# Patient Record
Sex: MALE | Race: ASIAN | NOT HISPANIC OR LATINO
[De-identification: names, ages, dates, MRNs, and addresses within clinical notes are randomized per-mention and may not be internally consistent; named-entity substitution may affect disease eponyms.]

---

## 2020-01-03 ENCOUNTER — TRANSCRIPTION ENCOUNTER (OUTPATIENT)
Age: 45
End: 2020-01-03

## 2020-08-02 ENCOUNTER — INPATIENT (INPATIENT)
Facility: HOSPITAL | Age: 45
LOS: 8 days | Discharge: HOME | DRG: 329 | End: 2020-08-11
Attending: FAMILY MEDICINE | Admitting: FAMILY MEDICINE
Payer: COMMERCIAL

## 2020-08-02 ENCOUNTER — EMERGENCY (EMERGENCY)
Facility: HOSPITAL | Age: 45
LOS: 1 days | Discharge: HOME | End: 2020-08-02
Attending: EMERGENCY MEDICINE | Admitting: EMERGENCY MEDICINE
Payer: COMMERCIAL

## 2020-08-02 ENCOUNTER — TRANSCRIPTION ENCOUNTER (OUTPATIENT)
Age: 45
End: 2020-08-02

## 2020-08-02 VITALS
WEIGHT: 179.9 LBS | TEMPERATURE: 99 F | RESPIRATION RATE: 18 BRPM | DIASTOLIC BLOOD PRESSURE: 70 MMHG | OXYGEN SATURATION: 98 % | HEART RATE: 96 BPM | SYSTOLIC BLOOD PRESSURE: 110 MMHG

## 2020-08-02 VITALS
DIASTOLIC BLOOD PRESSURE: 78 MMHG | HEART RATE: 98 BPM | RESPIRATION RATE: 16 BRPM | OXYGEN SATURATION: 98 % | TEMPERATURE: 100 F | SYSTOLIC BLOOD PRESSURE: 120 MMHG

## 2020-08-02 VITALS
DIASTOLIC BLOOD PRESSURE: 87 MMHG | TEMPERATURE: 101 F | SYSTOLIC BLOOD PRESSURE: 121 MMHG | HEIGHT: 65 IN | OXYGEN SATURATION: 99 % | RESPIRATION RATE: 16 BRPM | HEART RATE: 112 BPM | WEIGHT: 179.9 LBS

## 2020-08-02 DIAGNOSIS — K50.10 CROHN'S DISEASE OF LARGE INTESTINE WITHOUT COMPLICATIONS: ICD-10-CM

## 2020-08-02 LAB
ALBUMIN SERPL ELPH-MCNC: 4.4 G/DL — SIGNIFICANT CHANGE UP (ref 3.3–5)
ALP SERPL-CCNC: 75 U/L — SIGNIFICANT CHANGE UP (ref 30–120)
ALT FLD-CCNC: 31 U/L DA — SIGNIFICANT CHANGE UP (ref 10–60)
ANION GAP SERPL CALC-SCNC: 10 MMOL/L — SIGNIFICANT CHANGE UP (ref 5–17)
APPEARANCE UR: CLEAR — SIGNIFICANT CHANGE UP
APTT BLD: 32.9 SEC — SIGNIFICANT CHANGE UP (ref 27.5–35.5)
AST SERPL-CCNC: 19 U/L — SIGNIFICANT CHANGE UP (ref 10–40)
BASOPHILS # BLD AUTO: 0.04 K/UL — SIGNIFICANT CHANGE UP (ref 0–0.2)
BASOPHILS NFR BLD AUTO: 0.2 % — SIGNIFICANT CHANGE UP (ref 0–2)
BILIRUB SERPL-MCNC: 1.4 MG/DL — HIGH (ref 0.2–1.2)
BILIRUB UR-MCNC: NEGATIVE — SIGNIFICANT CHANGE UP
BUN SERPL-MCNC: 18 MG/DL — SIGNIFICANT CHANGE UP (ref 7–23)
CALCIUM SERPL-MCNC: 9.3 MG/DL — SIGNIFICANT CHANGE UP (ref 8.4–10.5)
CHLORIDE SERPL-SCNC: 104 MMOL/L — SIGNIFICANT CHANGE UP (ref 96–108)
CO2 SERPL-SCNC: 26 MMOL/L — SIGNIFICANT CHANGE UP (ref 22–31)
COLOR SPEC: YELLOW — SIGNIFICANT CHANGE UP
CREAT SERPL-MCNC: 1.18 MG/DL — SIGNIFICANT CHANGE UP (ref 0.5–1.3)
DIFF PNL FLD: NEGATIVE — SIGNIFICANT CHANGE UP
EOSINOPHIL # BLD AUTO: 0 K/UL — SIGNIFICANT CHANGE UP (ref 0–0.5)
EOSINOPHIL NFR BLD AUTO: 0 % — SIGNIFICANT CHANGE UP (ref 0–6)
GLUCOSE SERPL-MCNC: 142 MG/DL — HIGH (ref 70–99)
GLUCOSE UR QL: NEGATIVE MG/DL — SIGNIFICANT CHANGE UP
HCT VFR BLD CALC: 39.9 % — SIGNIFICANT CHANGE UP (ref 39–50)
HGB BLD-MCNC: 13.2 G/DL — SIGNIFICANT CHANGE UP (ref 13–17)
IMM GRANULOCYTES NFR BLD AUTO: 0.4 % — SIGNIFICANT CHANGE UP (ref 0–1.5)
INR BLD: 1 RATIO — SIGNIFICANT CHANGE UP (ref 0.88–1.16)
KETONES UR-MCNC: ABNORMAL
LACTATE SERPL-SCNC: 1.4 MMOL/L — SIGNIFICANT CHANGE UP (ref 0.7–2)
LEUKOCYTE ESTERASE UR-ACNC: NEGATIVE — SIGNIFICANT CHANGE UP
LIDOCAIN IGE QN: 71 U/L — LOW (ref 73–393)
LYMPHOCYTES # BLD AUTO: 1.32 K/UL — SIGNIFICANT CHANGE UP (ref 1–3.3)
LYMPHOCYTES # BLD AUTO: 7.3 % — LOW (ref 13–44)
MCHC RBC-ENTMCNC: 28.2 PG — SIGNIFICANT CHANGE UP (ref 27–34)
MCHC RBC-ENTMCNC: 33.1 GM/DL — SIGNIFICANT CHANGE UP (ref 32–36)
MCV RBC AUTO: 85.3 FL — SIGNIFICANT CHANGE UP (ref 80–100)
MONOCYTES # BLD AUTO: 0.88 K/UL — SIGNIFICANT CHANGE UP (ref 0–0.9)
MONOCYTES NFR BLD AUTO: 4.8 % — SIGNIFICANT CHANGE UP (ref 2–14)
NEUTROPHILS # BLD AUTO: 15.89 K/UL — HIGH (ref 1.8–7.4)
NEUTROPHILS NFR BLD AUTO: 87.3 % — HIGH (ref 43–77)
NITRITE UR-MCNC: NEGATIVE — SIGNIFICANT CHANGE UP
NRBC # BLD: 0 /100 WBCS — SIGNIFICANT CHANGE UP (ref 0–0)
PH UR: 6 — SIGNIFICANT CHANGE UP (ref 5–8)
PLATELET # BLD AUTO: 331 K/UL — SIGNIFICANT CHANGE UP (ref 150–400)
POTASSIUM SERPL-MCNC: 3.7 MMOL/L — SIGNIFICANT CHANGE UP (ref 3.5–5.3)
POTASSIUM SERPL-SCNC: 3.7 MMOL/L — SIGNIFICANT CHANGE UP (ref 3.5–5.3)
PROT SERPL-MCNC: 8.2 G/DL — SIGNIFICANT CHANGE UP (ref 6–8.3)
PROT UR-MCNC: 15 MG/DL
PROTHROM AB SERPL-ACNC: 12.1 SEC — SIGNIFICANT CHANGE UP (ref 10.6–13.6)
RBC # BLD: 4.68 M/UL — SIGNIFICANT CHANGE UP (ref 4.2–5.8)
RBC # FLD: 13.2 % — SIGNIFICANT CHANGE UP (ref 10.3–14.5)
SARS-COV-2 RNA SPEC QL NAA+PROBE: SIGNIFICANT CHANGE UP
SODIUM SERPL-SCNC: 140 MMOL/L — SIGNIFICANT CHANGE UP (ref 135–145)
SP GR SPEC: 1.01 — SIGNIFICANT CHANGE UP (ref 1.01–1.02)
UROBILINOGEN FLD QL: NEGATIVE MG/DL — SIGNIFICANT CHANGE UP
WBC # BLD: 18.2 K/UL — HIGH (ref 3.8–10.5)
WBC # FLD AUTO: 18.2 K/UL — HIGH (ref 3.8–10.5)

## 2020-08-02 PROCEDURE — 99223 1ST HOSP IP/OBS HIGH 75: CPT

## 2020-08-02 PROCEDURE — 83605 ASSAY OF LACTIC ACID: CPT

## 2020-08-02 PROCEDURE — 36415 COLL VENOUS BLD VENIPUNCTURE: CPT

## 2020-08-02 PROCEDURE — 74177 CT ABD & PELVIS W/CONTRAST: CPT | Mod: 26

## 2020-08-02 PROCEDURE — 85610 PROTHROMBIN TIME: CPT

## 2020-08-02 PROCEDURE — 87086 URINE CULTURE/COLONY COUNT: CPT

## 2020-08-02 PROCEDURE — 81001 URINALYSIS AUTO W/SCOPE: CPT

## 2020-08-02 PROCEDURE — 99285 EMERGENCY DEPT VISIT HI MDM: CPT

## 2020-08-02 PROCEDURE — 83690 ASSAY OF LIPASE: CPT

## 2020-08-02 PROCEDURE — 96375 TX/PRO/DX INJ NEW DRUG ADDON: CPT

## 2020-08-02 PROCEDURE — 85027 COMPLETE CBC AUTOMATED: CPT

## 2020-08-02 PROCEDURE — 93010 ELECTROCARDIOGRAM REPORT: CPT

## 2020-08-02 PROCEDURE — 85730 THROMBOPLASTIN TIME PARTIAL: CPT

## 2020-08-02 PROCEDURE — 71045 X-RAY EXAM CHEST 1 VIEW: CPT

## 2020-08-02 PROCEDURE — 80053 COMPREHEN METABOLIC PANEL: CPT

## 2020-08-02 PROCEDURE — 74177 CT ABD & PELVIS W/CONTRAST: CPT

## 2020-08-02 PROCEDURE — 71045 X-RAY EXAM CHEST 1 VIEW: CPT | Mod: 26

## 2020-08-02 PROCEDURE — 99283 EMERGENCY DEPT VISIT LOW MDM: CPT

## 2020-08-02 PROCEDURE — 87040 BLOOD CULTURE FOR BACTERIA: CPT

## 2020-08-02 PROCEDURE — 96361 HYDRATE IV INFUSION ADD-ON: CPT

## 2020-08-02 PROCEDURE — 87150 DNA/RNA AMPLIFIED PROBE: CPT

## 2020-08-02 PROCEDURE — 96374 THER/PROPH/DIAG INJ IV PUSH: CPT | Mod: XU

## 2020-08-02 PROCEDURE — 99285 EMERGENCY DEPT VISIT HI MDM: CPT | Mod: 25

## 2020-08-02 RX ORDER — AMLODIPINE BESYLATE 2.5 MG/1
5 TABLET ORAL DAILY
Refills: 0 | Status: DISCONTINUED | OUTPATIENT
Start: 2020-08-02 | End: 2020-08-07

## 2020-08-02 RX ORDER — VALSARTAN 80 MG/1
160 TABLET ORAL DAILY
Refills: 0 | Status: DISCONTINUED | OUTPATIENT
Start: 2020-08-02 | End: 2020-08-06

## 2020-08-02 RX ORDER — OXYCODONE HYDROCHLORIDE 5 MG/1
5 TABLET ORAL EVERY 6 HOURS
Refills: 0 | Status: DISCONTINUED | OUTPATIENT
Start: 2020-08-02 | End: 2020-08-07

## 2020-08-02 RX ORDER — PIPERACILLIN AND TAZOBACTAM 4; .5 G/20ML; G/20ML
3.38 INJECTION, POWDER, LYOPHILIZED, FOR SOLUTION INTRAVENOUS ONCE
Refills: 0 | Status: COMPLETED | OUTPATIENT
Start: 2020-08-02 | End: 2020-08-02

## 2020-08-02 RX ORDER — IOHEXOL 300 MG/ML
30 INJECTION, SOLUTION INTRAVENOUS ONCE
Refills: 0 | Status: COMPLETED | OUTPATIENT
Start: 2020-08-02 | End: 2020-08-02

## 2020-08-02 RX ORDER — HEPARIN SODIUM 5000 [USP'U]/ML
5000 INJECTION INTRAVENOUS; SUBCUTANEOUS EVERY 8 HOURS
Refills: 0 | Status: DISCONTINUED | OUTPATIENT
Start: 2020-08-02 | End: 2020-08-06

## 2020-08-02 RX ORDER — ENTECAVIR 0.5 MG/1
0.5 TABLET ORAL DAILY
Refills: 0 | Status: DISCONTINUED | OUTPATIENT
Start: 2020-08-02 | End: 2020-08-07

## 2020-08-02 RX ORDER — METRONIDAZOLE 500 MG
TABLET ORAL
Refills: 0 | Status: DISCONTINUED | OUTPATIENT
Start: 2020-08-02 | End: 2020-08-03

## 2020-08-02 RX ORDER — METRONIDAZOLE 500 MG
500 TABLET ORAL ONCE
Refills: 0 | Status: COMPLETED | OUTPATIENT
Start: 2020-08-02 | End: 2020-08-02

## 2020-08-02 RX ORDER — ATORVASTATIN CALCIUM 80 MG/1
40 TABLET, FILM COATED ORAL AT BEDTIME
Refills: 0 | Status: DISCONTINUED | OUTPATIENT
Start: 2020-08-02 | End: 2020-08-07

## 2020-08-02 RX ORDER — ENTECAVIR 0.5 MG/1
0.5 TABLET ORAL DAILY
Refills: 0 | Status: DISCONTINUED | OUTPATIENT
Start: 2020-08-02 | End: 2020-08-02

## 2020-08-02 RX ORDER — SODIUM CHLORIDE 9 MG/ML
1000 INJECTION INTRAMUSCULAR; INTRAVENOUS; SUBCUTANEOUS ONCE
Refills: 0 | Status: COMPLETED | OUTPATIENT
Start: 2020-08-02 | End: 2020-08-02

## 2020-08-02 RX ORDER — CIPROFLOXACIN LACTATE 400MG/40ML
400 VIAL (ML) INTRAVENOUS EVERY 12 HOURS
Refills: 0 | Status: DISCONTINUED | OUTPATIENT
Start: 2020-08-02 | End: 2020-08-03

## 2020-08-02 RX ORDER — ACETAMINOPHEN 500 MG
650 TABLET ORAL ONCE
Refills: 0 | Status: COMPLETED | OUTPATIENT
Start: 2020-08-02 | End: 2020-08-02

## 2020-08-02 RX ORDER — ACETAMINOPHEN 500 MG
650 TABLET ORAL EVERY 6 HOURS
Refills: 0 | Status: DISCONTINUED | OUTPATIENT
Start: 2020-08-02 | End: 2020-08-07

## 2020-08-02 RX ORDER — METRONIDAZOLE 500 MG
500 TABLET ORAL EVERY 8 HOURS
Refills: 0 | Status: DISCONTINUED | OUTPATIENT
Start: 2020-08-02 | End: 2020-08-03

## 2020-08-02 RX ORDER — ONDANSETRON 8 MG/1
4 TABLET, FILM COATED ORAL ONCE
Refills: 0 | Status: COMPLETED | OUTPATIENT
Start: 2020-08-02 | End: 2020-08-02

## 2020-08-02 RX ADMIN — HEPARIN SODIUM 5000 UNIT(S): 5000 INJECTION INTRAVENOUS; SUBCUTANEOUS at 22:32

## 2020-08-02 RX ADMIN — IOHEXOL 30 MILLILITER(S): 300 INJECTION, SOLUTION INTRAVENOUS at 11:55

## 2020-08-02 RX ADMIN — Medication 100 MILLIGRAM(S): at 19:40

## 2020-08-02 RX ADMIN — Medication 650 MILLIGRAM(S): at 12:25

## 2020-08-02 RX ADMIN — ENTECAVIR 0.5 MILLIGRAM(S): 0.5 TABLET ORAL at 22:17

## 2020-08-02 RX ADMIN — SODIUM CHLORIDE 1000 MILLILITER(S): 9 INJECTION INTRAMUSCULAR; INTRAVENOUS; SUBCUTANEOUS at 14:40

## 2020-08-02 RX ADMIN — Medication 650 MILLIGRAM(S): at 17:39

## 2020-08-02 RX ADMIN — Medication 100 MILLIGRAM(S): at 22:15

## 2020-08-02 RX ADMIN — ONDANSETRON 4 MILLIGRAM(S): 8 TABLET, FILM COATED ORAL at 11:55

## 2020-08-02 RX ADMIN — Medication 650 MILLIGRAM(S): at 22:16

## 2020-08-02 RX ADMIN — SODIUM CHLORIDE 1000 MILLILITER(S): 9 INJECTION INTRAMUSCULAR; INTRAVENOUS; SUBCUTANEOUS at 11:55

## 2020-08-02 RX ADMIN — PIPERACILLIN AND TAZOBACTAM 200 GRAM(S): 4; .5 INJECTION, POWDER, LYOPHILIZED, FOR SOLUTION INTRAVENOUS at 14:39

## 2020-08-02 RX ADMIN — Medication 650 MILLIGRAM(S): at 17:09

## 2020-08-02 RX ADMIN — ATORVASTATIN CALCIUM 40 MILLIGRAM(S): 80 TABLET, FILM COATED ORAL at 22:16

## 2020-08-02 RX ADMIN — Medication 200 MILLIGRAM(S): at 20:54

## 2020-08-02 RX ADMIN — Medication 650 MILLIGRAM(S): at 11:55

## 2020-08-02 NOTE — ED PROVIDER NOTE - CARE PROVIDERS DIRECT ADDRESSES
[email or web address]

## 2020-08-02 NOTE — H&P ADULT - HISTORY OF PRESENT ILLNESS
Mr Mahmood is a 46 yo M who presented to [place] ED with RLQ pain found to have ascending colon thickening concerning for colitis or neoplasm. PMH Hepatitis B, HTN, HLD.  Pt seen and examined at bedside. He reports 6/10 RLQ pain which began at 2AM. Pain does not radiate elsewhere, he denies any injury to the area, he has decreased PO intake. He also reports Temp of 100.1F at home. Denies any associated chills. Denies headaches, nausea, vomiting, chest pain, SOB, palpitations, constipation, diarrhea, melena, hematochezia, dysuria. Last bowel movement was this morning and reportedly well formed and non-bloody. He has never had a colonoscopy. Denies family hx of colon cancer.   He is on Entecavir for a history of Hepatitis B and continues to take this medication. \  Patient has a different MRN # at [place] and labs and imaging can be found under MRN # [id number].   Noted to have Leukocytosis of 18k. CT scan pasted below:       LOWER CHEST: Within normal limits.    LIVER: Steatosis.  BILE DUCTS: Normal caliber.  GALLBLADDER: Within normal limits.  SPLEEN: Within normal limits.  PANCREAS: Within normal limits.  ADRENALS: Within normal limits.  KIDNEYS/URETERS: Within normal limits.    BLADDER: Within normal limits.  REPRODUCTIVE ORGANS: Prostate within normal limits.    BOWEL/LYMPH NODES: Circumferential mural thickening of the ascending colon with extension into the terminal ileum. Surrounding inflammatory changes. Enlarged mesenteric lymph nodes in the right lower quadrant, measuring up to 2.0 x 1.5 cm. Reflux of contrast into the distal esophagus. No bowel obstruction. Appendix is not visualized.  PERITONEUM: No ascites.  VESSELS: Within normal limits.  ABDOMINAL WALL: Within normal limits.  BONES: Degenerative changes.    IMPRESSION:  Circumferential mural thickening of the ascending colon with adjacent enlarged mesenteric lymph nodes. Primary consideration is neoplasm. Colitis is considered less likely. Mr Mahmood is a 44 yo M who presented to [place] ED with RLQ pain found to have ascending colon thickening concerning for colitis or neoplasm. PMH Hepatitis B, HTN, HLD.  Pt seen and examined at bedside. He reports 6/10 RLQ pain which began at 2AM. Pain does not radiate elsewhere, he denies any injury to the area, he has decreased PO intake. He also reports Temp of 100.1F at home. Denies any associated chills. Denies headaches, nausea, vomiting, chest pain, SOB, palpitations, constipation, diarrhea, melena, hematochezia, dysuria. Last bowel movement was this morning and reportedly well formed and non-bloody. He has never had a colonoscopy. Denies family hx of colon cancer.   He is on Entecavir for a history of Hepatitis B and continues to take this medication. \  Patient has a different MRN # at [place] and labs and imaging can be found under MRN # [id number].   Noted to have Leukocytosis of 18k. Cr clearance: 89.2  CT scan pasted below:       LOWER CHEST: Within normal limits.    LIVER: Steatosis.  BILE DUCTS: Normal caliber.  GALLBLADDER: Within normal limits.  SPLEEN: Within normal limits.  PANCREAS: Within normal limits.  ADRENALS: Within normal limits.  KIDNEYS/URETERS: Within normal limits.    BLADDER: Within normal limits.  REPRODUCTIVE ORGANS: Prostate within normal limits.    BOWEL/LYMPH NODES: Circumferential mural thickening of the ascending colon with extension into the terminal ileum. Surrounding inflammatory changes. Enlarged mesenteric lymph nodes in the right lower quadrant, measuring up to 2.0 x 1.5 cm. Reflux of contrast into the distal esophagus. No bowel obstruction. Appendix is not visualized.  PERITONEUM: No ascites.  VESSELS: Within normal limits.  ABDOMINAL WALL: Within normal limits.  BONES: Degenerative changes.    IMPRESSION:  Circumferential mural thickening of the ascending colon with adjacent enlarged mesenteric lymph nodes. Primary consideration is neoplasm. Colitis is considered less likely.

## 2020-08-02 NOTE — ED PROVIDER NOTE - CARE PLAN
Principal Discharge DX:	Abdominal pain Principal Discharge DX:	Abdominal pain  Goal:	R/o neoplasm  Secondary Diagnosis:	Colitis

## 2020-08-02 NOTE — ED ADULT NURSE NOTE - NSIMPLEMENTINTERV_GEN_ALL_ED
Implemented All Universal Safety Interventions:  [place] to call system. Call bell, personal items and telephone within reach. Instruct patient to call for assistance. Room bathroom lighting operational. Non-slip footwear when patient is off stretcher. Physically safe environment: no spills, clutter or unnecessary equipment. Stretcher in lowest position, wheels locked, appropriate side rails in place.

## 2020-08-02 NOTE — ED PROVIDER NOTE - PHYSICAL EXAMINATION
Gen: Well appearing in NAD  Head: NC/AT  Neck: trachea midline  Resp:  No distress  Ext: no deformities  bad: rlq tenderness  Neuro:  A&O appears non focal  Skin:  Warm and dry as visualized  Psych:  Normal affect and mood

## 2020-08-02 NOTE — ED PROVIDER NOTE - CARE PROVIDER_API CALL
Ron Ca  UROLOGY  [address]  [place] [address]  Phone: [phone]  Fax: [phone]  Follow Up Time:

## 2020-08-02 NOTE — ED PROVIDER NOTE - ATTENDING CONTRIBUTION TO CARE
45 male with rlq pain today. Fever of 101. Plan - labs, CT abd ro ap.    I performed a history and physical exam of the patient and discussed their management with the advanced care provider. I reviewed the advanced care provider's note and agree with the documented findings and plan of care. My medical decision making and objective findings are found above.

## 2020-08-02 NOTE — ED ADULT NURSE NOTE - OBJECTIVE STATEMENT
pt c/o sudden onset RLQ pain since 2am last night, now present with fever and tachycardia. no hx of sxh in the past. pt denies taking any tylenol PTA. pt denies vomiting, PO intolerance, urinary complaints, neck pain, recent travel, sick contacts,  headache, blurred vision, sinus congestion, hematuria, chest pain, sob, blurred vision or any other complaints.

## 2020-08-02 NOTE — ED PROVIDER NOTE - PATIENT PORTAL LINK FT
You can access the FollowMyHealth Patient Portal offered by [facility] by registering at the following website: [email or web address]. By joining [facility]’s FollowMyHealth portal, you will also be able to view your health information using other applications (apps) compatible with our system.

## 2020-08-02 NOTE — CONSULT NOTE ADULT - SUBJECTIVE AND OBJECTIVE BOX
46 y/o M pmhx of Hep B on Entecavir, HTN, HLD, transferred from [place] ED presents c/o of sudden onset of RLQ abdominal pain around 2-3 am today. PT states he was in [facility] prior to onset of symptoms and denies any previous episodes of similar sx, and denies any radiation of the pain. PT describes the pain as a 6/10 on onset and "continuous" in nature. Pt states he had a tmax of 102F at home prompting him to visit the ER. Pt admits to flatus and states he had a bowel movement today which was normal for him. Of note pt states he has never underwent a colonoscopy and denies family hx of colon ca. PT denies any chest pain, SOB, palpitations, N/V, chills, melena, hematochezia, recent travel or sick contacts. PT with wbc of 18.2 noted from [place] ER.      PAST MEDICAL & SURGICAL HISTORY:  HTN (hypertension)  No significant past surgical history      MEDICATIONS  (STANDING):  amLODIPine   Tablet 5 milliGRAM(s) Oral daily  atorvastatin 40 milliGRAM(s) Oral at bedtime  ciprofloxacin   IVPB 400 milliGRAM(s) IV Intermittent every 12 hours  entecavir 0.5 milliGRAM(s) Oral daily  heparin   Injectable 5000 Unit(s) SubCutaneous every 8 hours  metroNIDAZOLE  IVPB 500 milliGRAM(s) IV Intermittent every 8 hours  metroNIDAZOLE  IVPB      metroNIDAZOLE  IVPB 500 milliGRAM(s) IV Intermittent once  valsartan 160 milliGRAM(s) Oral daily    MEDICATIONS  (PRN):  acetaminophen   Tablet .. 650 milliGRAM(s) Oral every 6 hours PRN Temp greater or equal to 38C (100.4F), Mild Pain (1 - 3)  oxyCODONE    IR 5 milliGRAM(s) Oral every 6 hours PRN Moderate Pain (4 - 6)      acetaminophen   Tablet .. 650 milliGRAM(s) Oral every 6 hours PRN  amLODIPine   Tablet 5 milliGRAM(s) Oral daily  atorvastatin 40 milliGRAM(s) Oral at bedtime  ciprofloxacin   IVPB 400 milliGRAM(s) IV Intermittent every 12 hours  entecavir 0.5 milliGRAM(s) Oral daily  heparin   Injectable 5000 Unit(s) SubCutaneous every 8 hours  metroNIDAZOLE  IVPB 500 milliGRAM(s) IV Intermittent every 8 hours  metroNIDAZOLE  IVPB      metroNIDAZOLE  IVPB 500 milliGRAM(s) IV Intermittent once  oxyCODONE    IR 5 milliGRAM(s) Oral every 6 hours PRN  valsartan 160 milliGRAM(s) Oral daily      ibuprofen (Other)      SOCIAL HISTORY:   Tobacco Use: denies   Alcohol Use: denies     FAMILY HISTORY:  Denies any family hx of colon ca     Vital Signs Last 24 Hrs  T(C): 37.1 (02 Aug 2020 15:47), Max: 37.1 (02 Aug 2020 15:47)  T(F): 98.8 (02 Aug 2020 15:47), Max: 98.8 (02 Aug 2020 15:47)  HR: 96 (02 Aug 2020 15:47) (96 - 96)  BP: 110/70 (02 Aug 2020 15:47) (110/70 - 110/70)  BP(mean): --  RR: 18 (02 Aug 2020 15:47) (18 - 18)  SpO2: 98% (02 Aug 2020 15:47) (98% - 98%)    ROS  General: No acute distress, awake and alert  Head: Normal cephalic/atraumatic  Neck: Denies neck pain or palpable masses, hoarseness or stridor  Lymphatic: Denies cervical or axillary or femoral lymphadenopathy  Chest: No chest pain, cough or hemoptysis  Heart: No chest pain, palpitations  Abdomen: SEE HPI  Extremities: Denies cyanosis, open sores or swollen extremity  Neuro: Denies weakness, paralysis, syncope, loss of vision  Psych: Denies hallucinations, visual disturbances, or depression    PHYSICAL EXAM:  GENERAL: NAD, well-developed  HEAD:  Atraumatic, Normocephalic  EYES: EOMI, PERRLA, conjunctiva and sclera clear  CHEST/LUNG: CTABL, no ronchi, rales or wheezing  HEART: RRR, no MRG  ABDOMEN: Soft, mildly protuberant, nondistended, RLQ tenderness to deep palpation, nondistended, +bs  EXTREMITIES:  2+ Peripheral Pulses, No clubbing, cyanosis, or edema  PSYCH: AAOx3  NEUROLOGY: non-focal  SKIN: No rashes or lesions    LABS:  See chart from [place] ED     Imaging:   INTERPRETATION:  CLINICAL INFORMATION: Right lower quadrant pain. Fever.    COMPARISON: None.    PROCEDURE:  CT of the Abdomen and Pelvis was performed with intravenous contrast.  Intravenous contrast: 90 ml Omnipaque 350. 10 ml discarded.  Oral contrast: positive contrast was administered.  Sagittal and coronal reformats were performed.    FINDINGS:  LOWER CHEST: Within normal limits.    LIVER: Steatosis.  BILE DUCTS: Normal caliber.  GALLBLADDER: Within normal limits.  SPLEEN: Within normal limits.  PANCREAS: Within normal limits.  ADRENALS: Within normal limits.  KIDNEYS/URETERS: Within normal limits.    BLADDER: Within normal limits.  REPRODUCTIVE ORGANS: Prostate within normal limits.    BOWEL/LYMPH NODES: Circumferential mural thickening of the ascending colon with extension into the terminal ileum. Surrounding inflammatory changes. Enlarged mesenteric lymph nodes in the right lower quadrant, measuring up to 2.0 x 1.5 cm. Reflux of contrast into the distal esophagus. No bowel obstruction. Appendix is not visualized.  PERITONEUM: No ascites.  VESSELS: Within normal limits.  ABDOMINAL WALL: Within normal limits.  BONES: Degenerative changes.    IMPRESSION:  Circumferential mural thickening of the ascending colon with adjacent enlarged mesenteric lymph nodes. Primary consideration is neoplasm. Colitis is considered less likely.    ERIK JOSHUA M.D., ATTENDING RADIOLOGIST        Assessment:  46 y/o M hx of hep b on entecavir, with sudden onset RLQ abdominal pain, and CT abd/pelv findings concerning for neoplasm vs colitis,     Plan:  - CEA and GI pcr ordered, f/u results  - GI consult f/u recs  - cont care per primary team  - cont home dose of Entecavir for hep B  - pain control, supportive care  - zofran prn for nausea  - diet as tolerated  - will follow  - discussed with Dr. Schafer 46 y/o M pmhx of Hep B on Entecavir, HTN, HLD, transferred from [place] ED presents c/o of sudden onset of RLQ abdominal pain around 2-3 am today. PT states he was in [facility] prior to onset of symptoms and denies any previous episodes of similar sx, and denies any radiation of the pain. PT describes the pain as a 6/10 on onset and "continuous" in nature. Pt states he had a tmax of 102F at home prompting him to visit the ER. Pt admits to flatus and states he had a bowel movement today which was normal for him. Of note pt states he has never underwent a colonoscopy and denies family hx of colon ca. PT denies any chest pain, SOB, palpitations, N/V, chills, melena, hematochezia, recent travel or sick contacts. PT with wbc of 18.2 noted from [place] ER.      PAST MEDICAL & SURGICAL HISTORY:  HTN (hypertension)  No significant past surgical history      MEDICATIONS  (STANDING):  amLODIPine   Tablet 5 milliGRAM(s) Oral daily  atorvastatin 40 milliGRAM(s) Oral at bedtime  ciprofloxacin   IVPB 400 milliGRAM(s) IV Intermittent every 12 hours  entecavir 0.5 milliGRAM(s) Oral daily  heparin   Injectable 5000 Unit(s) SubCutaneous every 8 hours  metroNIDAZOLE  IVPB 500 milliGRAM(s) IV Intermittent every 8 hours  metroNIDAZOLE  IVPB      metroNIDAZOLE  IVPB 500 milliGRAM(s) IV Intermittent once  valsartan 160 milliGRAM(s) Oral daily    MEDICATIONS  (PRN):  acetaminophen   Tablet .. 650 milliGRAM(s) Oral every 6 hours PRN Temp greater or equal to 38C (100.4F), Mild Pain (1 - 3)  oxyCODONE    IR 5 milliGRAM(s) Oral every 6 hours PRN Moderate Pain (4 - 6)      acetaminophen   Tablet .. 650 milliGRAM(s) Oral every 6 hours PRN  amLODIPine   Tablet 5 milliGRAM(s) Oral daily  atorvastatin 40 milliGRAM(s) Oral at bedtime  ciprofloxacin   IVPB 400 milliGRAM(s) IV Intermittent every 12 hours  entecavir 0.5 milliGRAM(s) Oral daily  heparin   Injectable 5000 Unit(s) SubCutaneous every 8 hours  metroNIDAZOLE  IVPB 500 milliGRAM(s) IV Intermittent every 8 hours  metroNIDAZOLE  IVPB      metroNIDAZOLE  IVPB 500 milliGRAM(s) IV Intermittent once  oxyCODONE    IR 5 milliGRAM(s) Oral every 6 hours PRN  valsartan 160 milliGRAM(s) Oral daily      ibuprofen (Other)      SOCIAL HISTORY:   Tobacco Use: denies   Alcohol Use: denies     FAMILY HISTORY:  Denies any family hx of colon ca     Vital Signs Last 24 Hrs  T(C): 37.1 (02 Aug 2020 15:47), Max: 37.1 (02 Aug 2020 15:47)  T(F): 98.8 (02 Aug 2020 15:47), Max: 98.8 (02 Aug 2020 15:47)  HR: 96 (02 Aug 2020 15:47) (96 - 96)  BP: 110/70 (02 Aug 2020 15:47) (110/70 - 110/70)  BP(mean): --  RR: 18 (02 Aug 2020 15:47) (18 - 18)  SpO2: 98% (02 Aug 2020 15:47) (98% - 98%)    ROS  General: No acute distress, awake and alert  Head: Normal cephalic/atraumatic  Neck: Denies neck pain or palpable masses, hoarseness or stridor  Lymphatic: Denies cervical or axillary or femoral lymphadenopathy  Chest: No chest pain, cough or hemoptysis  Heart: No chest pain, palpitations  Abdomen: SEE HPI  Extremities: Denies cyanosis, open sores or swollen extremity  Neuro: Denies weakness, paralysis, syncope, loss of vision  Psych: Denies hallucinations, visual disturbances, or depression    PHYSICAL EXAM:  GENERAL: NAD, well-developed  HEAD:  Atraumatic, Normocephalic  EYES: EOMI, PERRLA, conjunctiva and sclera clear  CHEST/LUNG: CTABL, no ronchi, rales or wheezing  HEART: RRR, no MRG  ABDOMEN: Soft, mildly protuberant, nondistended, RLQ tenderness to deep palpation, nondistended, +bs  EXTREMITIES:  2+ Peripheral Pulses, No clubbing, cyanosis, or edema  PSYCH: AAOx3  NEUROLOGY: non-focal  SKIN: No rashes or lesions    LABS:  See chart from [place] ED     Imaging:   INTERPRETATION:  CLINICAL INFORMATION: Right lower quadrant pain. Fever.    COMPARISON: None.    PROCEDURE:  CT of the Abdomen and Pelvis was performed with intravenous contrast.  Intravenous contrast: 90 ml Omnipaque 350. 10 ml discarded.  Oral contrast: positive contrast was administered.  Sagittal and coronal reformats were performed.    FINDINGS:  LOWER CHEST: Within normal limits.    LIVER: Steatosis.  BILE DUCTS: Normal caliber.  GALLBLADDER: Within normal limits.  SPLEEN: Within normal limits.  PANCREAS: Within normal limits.  ADRENALS: Within normal limits.  KIDNEYS/URETERS: Within normal limits.    BLADDER: Within normal limits.  REPRODUCTIVE ORGANS: Prostate within normal limits.    BOWEL/LYMPH NODES: Circumferential mural thickening of the ascending colon with extension into the terminal ileum. Surrounding inflammatory changes. Enlarged mesenteric lymph nodes in the right lower quadrant, measuring up to 2.0 x 1.5 cm. Reflux of contrast into the distal esophagus. No bowel obstruction. Appendix is not visualized.  PERITONEUM: No ascites.  VESSELS: Within normal limits.  ABDOMINAL WALL: Within normal limits.  BONES: Degenerative changes.    IMPRESSION:  Circumferential mural thickening of the ascending colon with adjacent enlarged mesenteric lymph nodes. Primary consideration is neoplasm. Colitis is considered less likely.    ERIK JOSHUA M.D., ATTENDING RADIOLOGIST        Assessment:  46 y/o M hx of hep b on entecavir, with sudden onset RLQ abdominal pain, and CT abd/pelv findings concerning for neoplasm vs colitis,     Plan:  - CEA and GI pcr ordered, f/u results  - GI consult f/u recs  - cont care per primary team  - cont home dose of Entecavir for hep B, cont iv abx  - pain control, supportive care  - zofran prn for nausea  - diet as tolerated  - will follow  - discussed with Dr. Schafer

## 2020-08-02 NOTE — PATIENT PROFILE ADULT - NSASFALLNEEDSASSIST_GEN_A_NUR
[facility]    Progress Note    Audrey Shepard Patient Status:  Inpatient    1928 MRN [id number]   Location [facility] 3W/SW Attending Sharri Harp MD   Hosp Day # 1 PCP Bee Snow MD        Subjective:     Fly Perez Fever, unspecified fever cause  Due to above. Improved fever curve. Check Ct though slight wincing in RUQ. Elevated WBC count. More elevated. ? Check Ct.        Anemia, unspecified type  History of chronic anemia. No evidence of acute bleed.   Low MD  1/1/2019 no

## 2020-08-02 NOTE — H&P ADULT - ASSESSMENT
Mr Mahmood is a 46 yo M who presented to [place] ED with RLQ pain found to have ascending colon thickening concerning for colitis or neoplasm. PMH Hepatitis B, HTN, HLD.    Bowel wall thickening:   -concerning for colitis or neoplasm given adjacent lymphadenopathy  -patient informed about potential diagnoses.   -will place on Cipro and Flagyl in interim.   -GI consult to Dr. Easley  -Surgical Consult  -Clear liquid diet for now.   -discussed that patient will need eventual colonoscopy and will coordinate with GI team regarding timing given possibility of colitis.   -trend WBC count and for fever Mr Mahmood is a 46 yo M who presented to [place] ED with RLQ pain found to have ascending colon thickening concerning for colitis or neoplasm. PMH Hepatitis B, HTN, HLD.    Bowel wall thickening:   -concerning for colitis or neoplasm given adjacent lymphadenopathy  -patient informed about potential diagnoses and possibility of underlying malignancy.    -will place on Cipro and Flagyl in interim.   -GI consult to Dr. Easley  -Surgical Consult  -ID consult given possibility of colitis  -Clear liquid diet for now.   -discussed that patient will need eventual colonoscopy and will coordinate with GI team regarding timing given possibility of underlying colitis.   -trend WBC count and for fever  -f/u blood cultures (ordered under different MRN listed in HPI)    HTN: continue valsartan and Norvasc.     HLD: continue statin    Hepatitis B: patient takes Entecavir daily.   -ID consulted.     DVT ppx: SCD's and heparin

## 2020-08-02 NOTE — ED PROVIDER NOTE - NSFOLLOWUPINSTRUCTIONS_ED_ALL_ED_FT
Follow up with your urologist tomorrow for re-evaluation, ongoing care and treatment. If having worsening of symptoms or other related symptoms, RETURN TO THE ER IMMEDIATELY.

## 2020-08-02 NOTE — ED PROVIDER NOTE - OBJECTIVE STATEMENT
44 y/o M with hx of Hep B, HTN presents with c/o abdominal pain x today. States that he has constant RLQ pain since 2am today. Pt also felt warm this morning and temp at home was 100F. Denies taking any antipyretics PTA. Denies chills, n/v/d, dysuria/hematuria, flank pain, CP, SOB, cough, runny nose, body aches, recent travel, known sick contacts, headache, neck stiffness, rash, hx of abdominal surgeries. 44 y/o M with hx of Hep B, HTN presents with c/o abdominal pain x today. States that he has constant RLQ pain since 2am today. Pt also felt warm this morning and temp at home was 100F. Denies taking any antipyretics PTA. Denies chills, n/v/d, dysuria/hematuria, flank pain, CP, SOB, cough, runny nose, body aches, recent travel, known sick contacts, headache, neck stiffness, rash, hx of abdominal surgeries.  pmd; Dr. Rai (flushing)

## 2020-08-02 NOTE — ED PROVIDER NOTE - PROGRESS NOTE DETAILS
Paged surgery, Dr. Schafer and GI, Dr. Collins, awaiting call back Spoke to Dr. Easley, GI, advised admit to medicine for colonoscopy. accepting at [facility] ed, Dr. Gomez. Spoke to surgeon, Dr. Schafer, will see pt at \[facility]\"".

## 2020-08-02 NOTE — ED PROVIDER NOTE - OBJECTIVE STATEMENT
44yo M with hep B, and htn presents as transfer from [place] for colitis vs neoplasm. pt woke up this morning with rlq pain and low grade temp to 100. pt had CT at [place] showing transmural inflammation of ascending colon with enlarged lymph nodes concern for neoplasm and less likely colitis. pt received one dose of zosyn and transferred for GI/colonoscopy and surg consult

## 2020-08-02 NOTE — H&P ADULT - NSHPPHYSICALEXAM_GEN_ALL_CORE
T(C): 37.1 (08-02-20 @ 15:47), Max: 37.1 (08-02-20 @ 15:47)  HR: 96 (08-02-20 @ 15:47) (96 - 96)  BP: 110/70 (08-02-20 @ 15:47) (110/70 - 110/70)  RR: 18 (08-02-20 @ 15:47) (18 - 18)  SpO2: 98% (08-02-20 @ 15:47) (98% - 98%)  Wt(kg): --    Physical Exam:   GENERAL: well-groomed, well-developed, NAD  HEENT: head NC/AT; EOM intact, conjunctiva & sclera clear; hearing grossly intact, moist mucous membranes  NECK: supple, no JVD  RESPIRATORY: CTA B/L, no wheezing, rales, rhonchi or rubs  CARDIOVASCULAR: S1&S2, RRR, no murmurs or gallops  ABDOMEN: soft, RLQ tenderness to palpation, no palpable mass, non-distended, + Bowel sounds x4 quadrants, no guarding, rebound or rigidity  MUSCULOSKELETAL:  no clubbing, cyanosis or edema of all 4 extremities  LYMPH: no cervical lymphadenopathy  VASCULAR: Radial pulses 2+ bilaterally, no varicose veins   SKIN: warm and dry, color normal  NEUROLOGIC: AA&O X3, CN2-12 intact w/ no focal deficits, no sensory loss, motor Strength 5/5 in UE & LE B/L  Psych: Normal mood and affect, normal behavior

## 2020-08-02 NOTE — ED PROVIDER NOTE - CLINICAL SUMMARY MEDICAL DECISION MAKING FREE TEXT BOX
44 yo M with RLQ pain and fever since this am, no n/v/d/urinarysx/cough/rash, febrile in ED, +ttp RLQ, will do sepsis workup, ct scan abd/pelvis r/o appy, ivf, tylenol, reassess

## 2020-08-02 NOTE — ED ADULT NURSE NOTE - OBJECTIVE STATEMENT
Pt presents to the ED as a transfer from [facility] via ambulance, s/p abd pain. Pt has #20 IV access on the left a/c, flushes with ease.

## 2020-08-03 ENCOUNTER — TRANSCRIPTION ENCOUNTER (OUTPATIENT)
Age: 45
End: 2020-08-03

## 2020-08-03 DIAGNOSIS — I10 ESSENTIAL (PRIMARY) HYPERTENSION: ICD-10-CM

## 2020-08-03 DIAGNOSIS — A09 INFECTIOUS GASTROENTERITIS AND COLITIS, UNSPECIFIED: ICD-10-CM

## 2020-08-03 DIAGNOSIS — R93.3 ABNORMAL FINDINGS ON DIAGNOSTIC IMAGING OF OTHER PARTS OF DIGESTIVE TRACT: ICD-10-CM

## 2020-08-03 DIAGNOSIS — B19.10 UNSPECIFIED VIRAL HEPATITIS B WITHOUT HEPATIC COMA: ICD-10-CM

## 2020-08-03 DIAGNOSIS — Z29.9 ENCOUNTER FOR PROPHYLACTIC MEASURES, UNSPECIFIED: ICD-10-CM

## 2020-08-03 DIAGNOSIS — E78.5 HYPERLIPIDEMIA, UNSPECIFIED: ICD-10-CM

## 2020-08-03 LAB
-  CANDIDA ALBICANS: SIGNIFICANT CHANGE UP
-  CANDIDA GLABRATA: SIGNIFICANT CHANGE UP
-  CANDIDA KRUSEI: SIGNIFICANT CHANGE UP
-  CANDIDA PARAPSILOSIS: SIGNIFICANT CHANGE UP
-  CANDIDA TROPICALIS: SIGNIFICANT CHANGE UP
-  COAGULASE NEGATIVE STAPHYLOCOCCUS: SIGNIFICANT CHANGE UP
-  K. PNEUMONIAE GROUP: SIGNIFICANT CHANGE UP
-  KPC RESISTANCE GENE: SIGNIFICANT CHANGE UP
-  STREPTOCOCCUS SP. (NOT GRP A, B OR S PNEUMONIAE): SIGNIFICANT CHANGE UP
A BAUMANNII DNA SPEC QL NAA+PROBE: SIGNIFICANT CHANGE UP
ANION GAP SERPL CALC-SCNC: 6 MMOL/L — SIGNIFICANT CHANGE UP (ref 5–17)
BASOPHILS # BLD AUTO: 0.02 K/UL — SIGNIFICANT CHANGE UP (ref 0–0.2)
BASOPHILS NFR BLD AUTO: 0.2 % — SIGNIFICANT CHANGE UP (ref 0–2)
BUN SERPL-MCNC: 10 MG/DL — SIGNIFICANT CHANGE UP (ref 7–23)
CALCIUM SERPL-MCNC: 8.1 MG/DL — LOW (ref 8.5–10.1)
CHLORIDE SERPL-SCNC: 109 MMOL/L — HIGH (ref 96–108)
CO2 SERPL-SCNC: 27 MMOL/L — SIGNIFICANT CHANGE UP (ref 22–31)
CREAT SERPL-MCNC: 0.89 MG/DL — SIGNIFICANT CHANGE UP (ref 0.5–1.3)
CULTURE RESULTS: SIGNIFICANT CHANGE UP
E CLOAC COMP DNA BLD POS QL NAA+PROBE: SIGNIFICANT CHANGE UP
E COLI DNA BLD POS QL NAA+NON-PROBE: SIGNIFICANT CHANGE UP
ENTEROCOC DNA BLD POS QL NAA+NON-PROBE: SIGNIFICANT CHANGE UP
ENTEROCOC DNA BLD POS QL NAA+NON-PROBE: SIGNIFICANT CHANGE UP
EOSINOPHIL # BLD AUTO: 0.02 K/UL — SIGNIFICANT CHANGE UP (ref 0–0.5)
EOSINOPHIL NFR BLD AUTO: 0.2 % — SIGNIFICANT CHANGE UP (ref 0–6)
GLUCOSE SERPL-MCNC: 143 MG/DL — HIGH (ref 70–99)
GP B STREP DNA BLD POS QL NAA+NON-PROBE: SIGNIFICANT CHANGE UP
GRAM STN FLD: SIGNIFICANT CHANGE UP
HAEM INFLU DNA BLD POS QL NAA+NON-PROBE: SIGNIFICANT CHANGE UP
HCT VFR BLD CALC: 33.6 % — LOW (ref 39–50)
HGB BLD-MCNC: 11 G/DL — LOW (ref 13–17)
IMM GRANULOCYTES NFR BLD AUTO: 0.5 % — SIGNIFICANT CHANGE UP (ref 0–1.5)
K OXYTOCA DNA BLD POS QL NAA+NON-PROBE: SIGNIFICANT CHANGE UP
L MONOCYTOG DNA BLD POS QL NAA+NON-PROBE: SIGNIFICANT CHANGE UP
LYMPHOCYTES # BLD AUTO: 1.21 K/UL — SIGNIFICANT CHANGE UP (ref 1–3.3)
LYMPHOCYTES # BLD AUTO: 10.3 % — LOW (ref 13–44)
MCHC RBC-ENTMCNC: 27.9 PG — SIGNIFICANT CHANGE UP (ref 27–34)
MCHC RBC-ENTMCNC: 32.7 GM/DL — SIGNIFICANT CHANGE UP (ref 32–36)
MCV RBC AUTO: 85.3 FL — SIGNIFICANT CHANGE UP (ref 80–100)
METHOD TYPE: SIGNIFICANT CHANGE UP
MONOCYTES # BLD AUTO: 0.3 K/UL — SIGNIFICANT CHANGE UP (ref 0–0.9)
MONOCYTES NFR BLD AUTO: 2.5 % — SIGNIFICANT CHANGE UP (ref 2–14)
MRSA SPEC QL CULT: SIGNIFICANT CHANGE UP
MSSA DNA SPEC QL NAA+PROBE: SIGNIFICANT CHANGE UP
N MEN ISLT CULT: SIGNIFICANT CHANGE UP
NEUTROPHILS # BLD AUTO: 10.18 K/UL — HIGH (ref 1.8–7.4)
NEUTROPHILS NFR BLD AUTO: 86.3 % — HIGH (ref 43–77)
NRBC # BLD: 0 /100 WBCS — SIGNIFICANT CHANGE UP (ref 0–0)
P AERUGINOSA DNA BLD POS NAA+NON-PROBE: SIGNIFICANT CHANGE UP
PLATELET # BLD AUTO: 237 K/UL — SIGNIFICANT CHANGE UP (ref 150–400)
POTASSIUM SERPL-MCNC: 3.2 MMOL/L — LOW (ref 3.5–5.3)
POTASSIUM SERPL-SCNC: 3.2 MMOL/L — LOW (ref 3.5–5.3)
RBC # BLD: 3.94 M/UL — LOW (ref 4.2–5.8)
RBC # FLD: 13.3 % — SIGNIFICANT CHANGE UP (ref 10.3–14.5)
S MARCESCENS DNA BLD POS NAA+NON-PROBE: SIGNIFICANT CHANGE UP
S PNEUM DNA BLD POS QL NAA+NON-PROBE: SIGNIFICANT CHANGE UP
S PYO DNA BLD POS QL NAA+NON-PROBE: SIGNIFICANT CHANGE UP
SARS-COV-2 IGG SERPL QL IA: NEGATIVE — SIGNIFICANT CHANGE UP
SARS-COV-2 IGM SERPL IA-ACNC: <3.8 AU/ML — SIGNIFICANT CHANGE UP
SODIUM SERPL-SCNC: 142 MMOL/L — SIGNIFICANT CHANGE UP (ref 135–145)
SPECIMEN SOURCE: SIGNIFICANT CHANGE UP
SPECIMEN SOURCE: SIGNIFICANT CHANGE UP
WBC # BLD: 11.79 K/UL — HIGH (ref 3.8–10.5)
WBC # FLD AUTO: 11.79 K/UL — HIGH (ref 3.8–10.5)

## 2020-08-03 PROCEDURE — 99233 SBSQ HOSP IP/OBS HIGH 50: CPT

## 2020-08-03 PROCEDURE — 99222 1ST HOSP IP/OBS MODERATE 55: CPT

## 2020-08-03 PROCEDURE — 99223 1ST HOSP IP/OBS HIGH 75: CPT

## 2020-08-03 RX ORDER — MAGNESIUM SULFATE 500 MG/ML
1 VIAL (ML) INJECTION ONCE
Refills: 0 | Status: COMPLETED | OUTPATIENT
Start: 2020-08-03 | End: 2020-08-03

## 2020-08-03 RX ORDER — SODIUM CHLORIDE 9 MG/ML
1000 INJECTION INTRAMUSCULAR; INTRAVENOUS; SUBCUTANEOUS
Refills: 0 | Status: COMPLETED | OUTPATIENT
Start: 2020-08-04 | End: 2020-08-04

## 2020-08-03 RX ORDER — POTASSIUM CHLORIDE 20 MEQ
40 PACKET (EA) ORAL EVERY 4 HOURS
Refills: 0 | Status: COMPLETED | OUTPATIENT
Start: 2020-08-03 | End: 2020-08-03

## 2020-08-03 RX ORDER — SOD SULF/SODIUM/NAHCO3/KCL/PEG
1 SOLUTION, RECONSTITUTED, ORAL ORAL EVERY 4 HOURS
Refills: 0 | Status: COMPLETED | OUTPATIENT
Start: 2020-08-03 | End: 2020-08-03

## 2020-08-03 RX ORDER — PIPERACILLIN AND TAZOBACTAM 4; .5 G/20ML; G/20ML
3.38 INJECTION, POWDER, LYOPHILIZED, FOR SOLUTION INTRAVENOUS EVERY 8 HOURS
Refills: 0 | Status: DISCONTINUED | OUTPATIENT
Start: 2020-08-03 | End: 2020-08-07

## 2020-08-03 RX ADMIN — Medication 650 MILLIGRAM(S): at 00:32

## 2020-08-03 RX ADMIN — Medication 650 MILLIGRAM(S): at 14:10

## 2020-08-03 RX ADMIN — Medication 10 MILLIGRAM(S): at 21:44

## 2020-08-03 RX ADMIN — Medication 40 MILLIEQUIVALENT(S): at 17:22

## 2020-08-03 RX ADMIN — Medication 1 LITER(S): at 21:45

## 2020-08-03 RX ADMIN — Medication 200 MILLIGRAM(S): at 05:52

## 2020-08-03 RX ADMIN — Medication 40 MILLIEQUIVALENT(S): at 14:59

## 2020-08-03 RX ADMIN — PIPERACILLIN AND TAZOBACTAM 25 GRAM(S): 4; .5 INJECTION, POWDER, LYOPHILIZED, FOR SOLUTION INTRAVENOUS at 13:18

## 2020-08-03 RX ADMIN — Medication 650 MILLIGRAM(S): at 13:18

## 2020-08-03 RX ADMIN — Medication 100 MILLIGRAM(S): at 05:52

## 2020-08-03 RX ADMIN — Medication 100 GRAM(S): at 17:21

## 2020-08-03 RX ADMIN — PIPERACILLIN AND TAZOBACTAM 25 GRAM(S): 4; .5 INJECTION, POWDER, LYOPHILIZED, FOR SOLUTION INTRAVENOUS at 21:44

## 2020-08-03 RX ADMIN — ENTECAVIR 0.5 MILLIGRAM(S): 0.5 TABLET ORAL at 11:10

## 2020-08-03 RX ADMIN — Medication 10 MILLIGRAM(S): at 16:46

## 2020-08-03 RX ADMIN — ATORVASTATIN CALCIUM 40 MILLIGRAM(S): 80 TABLET, FILM COATED ORAL at 21:44

## 2020-08-03 RX ADMIN — Medication 1 LITER(S): at 17:22

## 2020-08-03 NOTE — PROGRESS NOTE ADULT - SUBJECTIVE AND OBJECTIVE BOX
Contact Number: [phone]    Patient is a 45y old  Male who presents with a chief complaint of Possible Colitis (02 Aug 2020 19:45)      SUBJECTIVE / OVERNIGHT EVENTS: Has some rlq pain, non-radiating. Denies nausea, vomiting, fevers, chills, hematochezia.    MEDICATIONS  (STANDING):  amLODIPine   Tablet 5 milliGRAM(s) Oral daily  atorvastatin 40 milliGRAM(s) Oral at bedtime  ciprofloxacin   IVPB 400 milliGRAM(s) IV Intermittent every 12 hours  entecavir 0.5 milliGRAM(s) Oral daily  heparin   Injectable 5000 Unit(s) SubCutaneous every 8 hours  metroNIDAZOLE  IVPB 500 milliGRAM(s) IV Intermittent every 8 hours  metroNIDAZOLE  IVPB      valsartan 160 milliGRAM(s) Oral daily    MEDICATIONS  (PRN):  acetaminophen   Tablet .. 650 milliGRAM(s) Oral every 6 hours PRN Temp greater or equal to 38C (100.4F), Mild Pain (1 - 3)  oxyCODONE    IR 5 milliGRAM(s) Oral every 6 hours PRN Moderate Pain (4 - 6)      Vital Signs Last 24 Hrs  T(C): 36.8 (03 Aug 2020 05:06), Max: 37.6 (02 Aug 2020 20:50)  T(F): 98.3 (03 Aug 2020 05:06), Max: 99.6 (02 Aug 2020 20:50)  HR: 81 (03 Aug 2020 05:06) (77 - 96)  BP: 99/65 (03 Aug 2020 05:06) (99/65 - 124/71)  BP(mean): --  RR: 18 (03 Aug 2020 05:06) (16 - 18)  SpO2: 98% (03 Aug 2020 05:06) (98% - 99%)  CAPILLARY BLOOD GLUCOSE          PHYSICAL EXAM:  GENERAL: NAD, well-developed  EYES: EOMI, conjunctiva and sclera clear  NECK: Supple, No JVD  CHEST/LUNG: Clear to auscultation bilaterally; No wheeze  HEART: Regular rate and rhythm; No murmurs  ABDOMEN: Soft, mild tenderness to RLQ, Nondistended; Bowel sounds present  EXTREMITIES:  2+ Peripheral Pulses, No clubbing, cyanosis, or edema  PSYCH: AAOx3  NEUROLOGY: no facial asymmetry; moves all extremities  Speech: fluent        LABS:                        RADIOLOGY & ADDITIONAL TESTS:    Imaging Personally Reviewed:    Consultant(s) Notes Reviewed:      Care Discussed with Consultants/Other Providers: Dr. Easley re: plan for colonscopy Contact Number: [phone]    Patient is a 45y old  Male who presents with a chief complaint of Possible Colitis (02 Aug 2020 19:45)      SUBJECTIVE / OVERNIGHT EVENTS: Has some rlq pain, non-radiating. Denies nausea, vomiting, fevers, chills, hematochezia.    MEDICATIONS  (STANDING):  amLODIPine   Tablet 5 milliGRAM(s) Oral daily  atorvastatin 40 milliGRAM(s) Oral at bedtime  ciprofloxacin   IVPB 400 milliGRAM(s) IV Intermittent every 12 hours  entecavir 0.5 milliGRAM(s) Oral daily  heparin   Injectable 5000 Unit(s) SubCutaneous every 8 hours  metroNIDAZOLE  IVPB 500 milliGRAM(s) IV Intermittent every 8 hours  metroNIDAZOLE  IVPB      valsartan 160 milliGRAM(s) Oral daily    MEDICATIONS  (PRN):  acetaminophen   Tablet .. 650 milliGRAM(s) Oral every 6 hours PRN Temp greater or equal to 38C (100.4F), Mild Pain (1 - 3)  oxyCODONE    IR 5 milliGRAM(s) Oral every 6 hours PRN Moderate Pain (4 - 6)      Vital Signs Last 24 Hrs  T(C): 36.8 (03 Aug 2020 05:06), Max: 37.6 (02 Aug 2020 20:50)  T(F): 98.3 (03 Aug 2020 05:06), Max: 99.6 (02 Aug 2020 20:50)  HR: 81 (03 Aug 2020 05:06) (77 - 96)  BP: 99/65 (03 Aug 2020 05:06) (99/65 - 124/71)  BP(mean): --  RR: 18 (03 Aug 2020 05:06) (16 - 18)  SpO2: 98% (03 Aug 2020 05:06) (98% - 99%)  CAPILLARY BLOOD GLUCOSE          PHYSICAL EXAM:  GENERAL: NAD, well-developed  EYES: EOMI, conjunctiva and sclera clear  NECK: Supple, No JVD  CHEST/LUNG: Clear to auscultation bilaterally; No wheeze  HEART: Regular rate and rhythm; No murmurs  ABDOMEN: Soft, mild tenderness to RLQ, Nondistended; Bowel sounds present  EXTREMITIES:  2+ Peripheral Pulses, No clubbing, cyanosis, or edema  PSYCH: AAOx3  NEUROLOGY: no facial asymmetry; moves all extremities  Speech: fluent        LABS: results reviewed                        RADIOLOGY & ADDITIONAL TESTS:    Imaging Personally Reviewed:    Consultant(s) Notes Reviewed:      Care Discussed with Consultants/Other Providers: Dr. Easley re: plan for colonscopy Contact Number: [phone]    Patient is a 45y old  Male who presents with a chief complaint of Possible Colitis (02 Aug 2020 19:45)      SUBJECTIVE / OVERNIGHT EVENTS: Has some rlq pain, non-radiating. Denies nausea, vomiting, fevers, chills, hematochezia.  Tolerating clear diet    MEDICATIONS  (STANDING):  amLODIPine   Tablet 5 milliGRAM(s) Oral daily  atorvastatin 40 milliGRAM(s) Oral at bedtime  ciprofloxacin   IVPB 400 milliGRAM(s) IV Intermittent every 12 hours  entecavir 0.5 milliGRAM(s) Oral daily  heparin   Injectable 5000 Unit(s) SubCutaneous every 8 hours  metroNIDAZOLE  IVPB 500 milliGRAM(s) IV Intermittent every 8 hours  metroNIDAZOLE  IVPB      valsartan 160 milliGRAM(s) Oral daily    MEDICATIONS  (PRN):  acetaminophen   Tablet .. 650 milliGRAM(s) Oral every 6 hours PRN Temp greater or equal to 38C (100.4F), Mild Pain (1 - 3)  oxyCODONE    IR 5 milliGRAM(s) Oral every 6 hours PRN Moderate Pain (4 - 6)      Vital Signs Last 24 Hrs  T(C): 36.8 (03 Aug 2020 05:06), Max: 37.6 (02 Aug 2020 20:50)  T(F): 98.3 (03 Aug 2020 05:06), Max: 99.6 (02 Aug 2020 20:50)  HR: 81 (03 Aug 2020 05:06) (77 - 96)  BP: 99/65 (03 Aug 2020 05:06) (99/65 - 124/71)  BP(mean): --  RR: 18 (03 Aug 2020 05:06) (16 - 18)  SpO2: 98% (03 Aug 2020 05:06) (98% - 99%)  CAPILLARY BLOOD GLUCOSE          PHYSICAL EXAM:  GENERAL: NAD, well-developed  EYES: EOMI, conjunctiva and sclera clear  NECK: Supple, No JVD  CHEST/LUNG: Clear to auscultation bilaterally; No wheeze  HEART: Regular rate and rhythm; No murmurs  ABDOMEN: Soft, mild tenderness to RLQ, Nondistended; Bowel sounds present  EXTREMITIES:  2+ Peripheral Pulses, No clubbing, cyanosis, or edema  PSYCH: AAOx3  NEUROLOGY: no facial asymmetry; moves all extremities  Speech: fluent        LABS: results reviewed                        RADIOLOGY & ADDITIONAL TESTS:    Imaging Personally Reviewed:    Consultant(s) Notes Reviewed:      Care Discussed with Consultants/Other Providers: Dr. Easley re: plan for colonscopy and  Dr. Grant regarding switch in IV abx to Zosyn given gram variable [place] bacteremia

## 2020-08-03 NOTE — CONSULT NOTE ADULT - CONSULT REASON
Hpi Title: Evaluation of Skin Lesions How Severe Are Your Spot(S)?: mild Colitis Have Your Spot(S) Been Treated In The Past?: has not been treated

## 2020-08-03 NOTE — PROGRESS NOTE ADULT - PROBLEM SELECTOR PLAN 5
DVT PPX with heparin DVT PPX with heparin  Hypokalemia- replete; will also give Magnesium sulfate as pt will have bowel prep later today

## 2020-08-03 NOTE — PROGRESS NOTE ADULT - SUBJECTIVE AND OBJECTIVE BOX
SURGERY PA NOTE ON BEHALF OF DR. CALDERON / GENERAL SURGERY:    S: Patient seen and examined at bedside.  Reports improvement in abdominal pain, though still has right-sided discomfort with exertion.  Denies passing flatus, +bm yesterday morning.  +Urinating.  Tolerating CLD without N/V.      MEDICATIONS:  acetaminophen   Tablet .. 650 milliGRAM(s) Oral every 6 hours PRN  amLODIPine   Tablet 5 milliGRAM(s) Oral daily  atorvastatin 40 milliGRAM(s) Oral at bedtime  ciprofloxacin   IVPB 400 milliGRAM(s) IV Intermittent every 12 hours  entecavir 0.5 milliGRAM(s) Oral daily  heparin   Injectable 5000 Unit(s) SubCutaneous every 8 hours  metroNIDAZOLE  IVPB 500 milliGRAM(s) IV Intermittent every 8 hours  metroNIDAZOLE  IVPB      oxyCODONE    IR 5 milliGRAM(s) Oral every 6 hours PRN  valsartan 160 milliGRAM(s) Oral daily      O:  Vital Signs Last 24 Hrs  T(C): 36.8 (03 Aug 2020 05:06), Max: 37.6 (02 Aug 2020 20:50)  T(F): 98.3 (03 Aug 2020 05:06), Max: 99.6 (02 Aug 2020 20:50)  HR: 81 (03 Aug 2020 05:06) (77 - 96)  BP: 99/65 (03 Aug 2020 05:06) (99/65 - 124/71)  BP(mean): --  RR: 18 (03 Aug 2020 05:06) (16 - 18)  SpO2: 98% (03 Aug 2020 05:06) (98% - 99%)    I&O SUMMARY:      PHYSICAL EXAM:  Lungs: CTA bilat without W/R/R  Card: S1S2  Abd: Soft, ND.  +Tender RUQ/RLQ.  Hypoactive BS x 4.    Ext: Calves soft, NT, without edema bilat LE     LABS:                        11.0   11.79 )-----------( 237      ( 03 Aug 2020 10:02 )             33.6     08-03    142  |  109<H>  |  10  ----------------------------<  143<H>  3.2<L>   |  27  |  0.89    Ca    8.1<L>      03 Aug 2020 10:02              A: 46 yo male with RLQ pain - neoplasm? vs. colitis; Hep b+    P: Discussed with Dr. Calderon      Continue current care per medicine      Continue Zosyn/Entecavir per ID       Plan for colonoscopy with GI tomorrow, further decision making predicated upon findings      Will follow SURGERY PA NOTE ON BEHALF OF DR. CALDERON / GENERAL SURGERY:      S: Patient seen and examined at bedside.  Reports improvement in abdominal pain, though still has right-sided discomfort with exertion.  Denies passing flatus, +bm yesterday morning.  +Urinating.  Tolerating CLD without N/V.        MEDICATIONS:  acetaminophen   Tablet .. 650 milliGRAM(s) Oral every 6 hours PRN  amLODIPine   Tablet 5 milliGRAM(s) Oral daily  atorvastatin 40 milliGRAM(s) Oral at bedtime  ciprofloxacin   IVPB 400 milliGRAM(s) IV Intermittent every 12 hours  entecavir 0.5 milliGRAM(s) Oral daily  heparin   Injectable 5000 Unit(s) SubCutaneous every 8 hours  metroNIDAZOLE  IVPB 500 milliGRAM(s) IV Intermittent every 8 hours  metroNIDAZOLE  IVPB      oxyCODONE    IR 5 milliGRAM(s) Oral every 6 hours PRN  valsartan 160 milliGRAM(s) Oral daily      O:  Vital Signs Last 24 Hrs  T(F): 98.3 (03 Aug 2020 05:06), Max: 99.6 (02 Aug 2020 20:50)  HR: 81 (03 Aug 2020 05:06) (77 - 96)  BP: 99/65 (03 Aug 2020 05:06) (99/65 - 124/71)  RR: 18 (03 Aug 2020 05:06) (16 - 18)  SpO2: 98% (03 Aug 2020 05:06) (98% - 99%)      PHYSICAL EXAM:  Lungs: CTA bilat without W/R/R  Card: S1S2  Abd: Soft, ND.  +Tender RUQ/RLQ.  Hypoactive BS x 4.    Ext: Calves soft, NT, without edema bilat LE       LABS:                        11.0   11.79 )-----------( 237      ( 03 Aug 2020 10:02 )             33.6     08-03    142  |  109<H>  |  10  ----------------------------<  143<H>  3.2<L>   |  27  |  0.89    Ca    8.1<L>      03 Aug 2020 10:02      A: 44 yo male with RLQ pain - neoplasm? vs. colitis; Hep b+      P: Discussed with Dr. Calderon      Continue current care per medicine      Continue Zosyn/Entecavir per ID       Plan for colonoscopy with GI tomorrow, further decision making predicated upon findings      Will follow

## 2020-08-03 NOTE — CONSULT NOTE ADULT - SUBJECTIVE AND OBJECTIVE BOX
[facility]  INFECTIOUS DISEASES   =======================================================    [id number]  MARY KIM     CC: Colitis     HPI:  44 y/o man with PMH of HTN and hepatitis B was admitted on 8/2 with abdominal pain mostly in R mid and lower part of abdomen started the same day. He doesn't have any nausea, vomiting, diarrhea or constipation. Tolerating clear liquid very well. Abdominal CT showed "Circumferential mural thickening of the ascending colon with extension into the terminal ileum" so he was started on cipro+flagyl for colitis. Labs done [place] (MRN # [id number]) showed leukocytosis of 18k. Never had colitis or any other GI diseases in the past, no abdominal surgery. For HBV takes entecavir for 9eyars and goes for surveillance [facility] annually except for this year due to COVID.  Never had colonoscopy.     PAST MEDICAL & SURGICAL HISTORY:  HTN (hypertension)  No significant past surgical history    Social Hx: No smoking, ETOH or drugs     FAMILY HISTORY: no HBV as per him.     Allergies  ibuprofen (Other)    Antibiotics:  MEDICATIONS  (STANDING):  amLODIPine   Tablet 5 milliGRAM(s) Oral daily  atorvastatin 40 milliGRAM(s) Oral at bedtime  ciprofloxacin   IVPB 400 milliGRAM(s) IV Intermittent every 12 hours  entecavir 0.5 milliGRAM(s) Oral daily  heparin   Injectable 5000 Unit(s) SubCutaneous every 8 hours  metroNIDAZOLE  IVPB 500 milliGRAM(s) IV Intermittent every 8 hours  metroNIDAZOLE  IVPB      valsartan 160 milliGRAM(s) Oral daily    MEDICATIONS  (PRN):  acetaminophen   Tablet .. 650 milliGRAM(s) Oral every 6 hours PRN Temp greater or equal to 38C (100.4F), Mild Pain (1 - 3)  oxyCODONE    IR 5 milliGRAM(s) Oral every 6 hours PRN Moderate Pain (4 - 6)       REVIEW OF SYSTEMS:  CONSTITUTIONAL:  No Fever or chills  HEENT:  No diplopia or blurred vision.  No sore throat or runny nose.  CARDIOVASCULAR:  No chest pain or SOB.  RESPIRATORY:  No cough, shortness of breath, PND or orthopnea.  GASTROINTESTINAL:  No nausea, vomiting or diarrhea.  GENITOURINARY:  No dysuria, frequency or urgency. No Blood in urine  MUSCULOSKELETAL:  no joint aches, no muscle pain  SKIN:  No change in skin, hair or nails.  NEUROLOGIC:  No paresthesias, fasciculations, seizures or weakness.  PSYCHIATRIC:  No disorder of thought or mood.  ENDOCRINE:  No heat or cold intolerance, polyuria or polydipsia.  HEMATOLOGICAL:  No easy bruising or bleeding.     Physical Exam:  Vital Signs Last 24 Hrs  T(C): 36.8 (03 Aug 2020 05:06), Max: 37.6 (02 Aug 2020 20:50)  T(F): 98.3 (03 Aug 2020 05:06), Max: 99.6 (02 Aug 2020 20:50)  HR: 81 (03 Aug 2020 05:06) (77 - 96)  BP: 99/65 (03 Aug 2020 05:06) (99/65 - 124/71)  BP(mean): --  RR: 18 (03 Aug 2020 05:06) (16 - 18)  SpO2: 98% (03 Aug 2020 05:06) (98% - 99%)  Weight (kg): 81.6 (08-02 @ 15:47)  GEN: NAD, obese  HEENT: normocephalic and atraumatic. EOMI. PERRL.    NECK: Supple.  No lymphadenopathy   LUNGS: Clear to auscultation.  HEART: Regular rate and rhythm without murmur.  ABDOMEN: Soft, mild tenderness in RLQ, no rebound tenderness or guarding, and nondistended.  Positive bowel sounds.    : No CVA tenderness  EXTREMITIES: Without any cyanosis, clubbing, rash, lesions or edema.  NEUROLOGIC: grossly intact.  PSYCHIATRIC: Appropriate affect .  SKIN: No ulceration or induration present.    Labs:  Labs are reviewed from [place].     COVID-19 PCR: NotDetec (08-02-20 @ 16:48)    All imaging and other data have been reviewed.  Abdominal CT 8/2:   LOWER CHEST: Within normal limits.  LIVER: Steatosis.  BILE DUCTS: Normal caliber.  GALLBLADDER: Within normal limits.  SPLEEN: Within normal limits.  PANCREAS: Within normal limits.  ADRENALS: Within normal limits.  KIDNEYS/URETERS: Within normal limits.  BLADDER: Within normal limits.  REPRODUCTIVE ORGANS: Prostate within normal limits.  BOWEL/LYMPH NODES: Circumferential mural thickening of the ascending colon with extension into the terminal ileum. Surrounding inflammatory changes. Enlarged mesenteric lymph nodes in the right lower quadrant, measuring up to 2.0 x 1.5 cm. Reflux of contrast into the distal esophagus. No bowel obstruction. Appendix is not visualized.  PERITONEUM: No ascites.  VESSELS: Within normal limits.  ABDOMINAL WALL: Within normal limits.  BONES: Degenerative changes.  IMPRESSION:  Circumferential mural thickening of the ascending colon with adjacent enlarged mesenteric lymph nodes. Primary consideration is neoplasm. Colitis is considered less likely. (02 Aug 2020 17:22)    Assessment and Plan:   44 y/o man with PMH of HTN and hepatitis B was admitted on 8/2 with abdominal pain mostly in R mid and lower part of abdomen started the same day. He doesn't have any nausea, vomiting, diarrhea or constipation. Tolerating clear liquid very well. Abdominal CT showed "Circumferential mural thickening of the ascending colon with extension into the terminal ileum" so he was started on cipro+flagyl for colitis. Labs done [place] (MRN # [id number]).  For HBV takes entecavir for 9 years and goes for surveillance [facility] annually except for this year due to COVID.  Never had colonoscopy.   It could be colitis but needs to rule out underlying malignancy as well.   UA noraml  WBC 18k with left shift    Colitis:  - Agree with ciprofloxacin 400mg q12, when able to tolerate regular diet will switch to oral  - Agree with metronidazole 500mg q8h switch to oral when able to tolerate diet  - After treatment, as outpatient, will need colonoscopy to rule out IBD and malignancy  - Trend WBC     HBV:  - Continue Entecavir   - LFTs are normal, TB mild elevation  - HBV VL PCR    Thank you for courtesy of this consult.     Will follow.    rIma Grant MD  Division of Infectious Diseases   [phone] [facility]  INFECTIOUS DISEASES   =======================================================    [id number]  MARY KIM     CC: Colitis     HPI:  46 y/o man with PMH of HTN and hepatitis B was admitted on 8/2 with abdominal pain mostly in R mid and lower part of abdomen started the same day. He doesn't have any nausea, vomiting, diarrhea or constipation. Tolerating clear liquid very well. Abdominal CT showed "Circumferential mural thickening of the ascending colon with extension into the terminal ileum" so he was started on cipro+flagyl for colitis. Labs done [place] (MRN # [id number]) showed leukocytosis of 18k. Never had colitis or any other GI diseases in the past, no abdominal surgery. For HBV takes entecavir for 9eyars and goes for surveillance [facility] annually except for this year due to COVID.  Never had colonoscopy.     PAST MEDICAL & SURGICAL HISTORY:  HTN (hypertension)  No significant past surgical history    Social Hx: No smoking, ETOH or drugs     FAMILY HISTORY: no HBV as per him.     Allergies  ibuprofen (Other)    Antibiotics:  MEDICATIONS  (STANDING):  amLODIPine   Tablet 5 milliGRAM(s) Oral daily  atorvastatin 40 milliGRAM(s) Oral at bedtime  ciprofloxacin   IVPB 400 milliGRAM(s) IV Intermittent every 12 hours  entecavir 0.5 milliGRAM(s) Oral daily  heparin   Injectable 5000 Unit(s) SubCutaneous every 8 hours  metroNIDAZOLE  IVPB 500 milliGRAM(s) IV Intermittent every 8 hours  metroNIDAZOLE  IVPB      valsartan 160 milliGRAM(s) Oral daily    MEDICATIONS  (PRN):  acetaminophen   Tablet .. 650 milliGRAM(s) Oral every 6 hours PRN Temp greater or equal to 38C (100.4F), Mild Pain (1 - 3)  oxyCODONE    IR 5 milliGRAM(s) Oral every 6 hours PRN Moderate Pain (4 - 6)       REVIEW OF SYSTEMS:  CONSTITUTIONAL:  No Fever or chills  HEENT:  No diplopia or blurred vision.  No sore throat or runny nose.  CARDIOVASCULAR:  No chest pain or SOB.  RESPIRATORY:  No cough, shortness of breath, PND or orthopnea.  GASTROINTESTINAL:  No nausea, vomiting or diarrhea.  GENITOURINARY:  No dysuria, frequency or urgency. No Blood in urine  MUSCULOSKELETAL:  no joint aches, no muscle pain  SKIN:  No change in skin, hair or nails.  NEUROLOGIC:  No paresthesias, fasciculations, seizures or weakness.  PSYCHIATRIC:  No disorder of thought or mood.  ENDOCRINE:  No heat or cold intolerance, polyuria or polydipsia.  HEMATOLOGICAL:  No easy bruising or bleeding.     Physical Exam:  Vital Signs Last 24 Hrs  T(C): 36.8 (03 Aug 2020 05:06), Max: 37.6 (02 Aug 2020 20:50)  T(F): 98.3 (03 Aug 2020 05:06), Max: 99.6 (02 Aug 2020 20:50)  HR: 81 (03 Aug 2020 05:06) (77 - 96)  BP: 99/65 (03 Aug 2020 05:06) (99/65 - 124/71)  BP(mean): --  RR: 18 (03 Aug 2020 05:06) (16 - 18)  SpO2: 98% (03 Aug 2020 05:06) (98% - 99%)  Weight (kg): 81.6 (08-02 @ 15:47)  GEN: NAD, obese  HEENT: normocephalic and atraumatic. EOMI. PERRL.    NECK: Supple.  No lymphadenopathy   LUNGS: Clear to auscultation.  HEART: Regular rate and rhythm without murmur.  ABDOMEN: Soft, mild tenderness in RLQ, no rebound tenderness or guarding, and nondistended.  Positive bowel sounds.    : No CVA tenderness  EXTREMITIES: Without any cyanosis, clubbing, rash, lesions or edema.  NEUROLOGIC: grossly intact.  PSYCHIATRIC: Appropriate affect .  SKIN: No ulceration or induration present.    Labs:  Labs are reviewed from [place].     COVID-19 PCR: NotDetec (08-02-20 @ 16:48)    All imaging and other data have been reviewed.  Abdominal CT 8/2:   LOWER CHEST: Within normal limits.  LIVER: Steatosis.  BILE DUCTS: Normal caliber.  GALLBLADDER: Within normal limits.  SPLEEN: Within normal limits.  PANCREAS: Within normal limits.  ADRENALS: Within normal limits.  KIDNEYS/URETERS: Within normal limits.  BLADDER: Within normal limits.  REPRODUCTIVE ORGANS: Prostate within normal limits.  BOWEL/LYMPH NODES: Circumferential mural thickening of the ascending colon with extension into the terminal ileum. Surrounding inflammatory changes. Enlarged mesenteric lymph nodes in the right lower quadrant, measuring up to 2.0 x 1.5 cm. Reflux of contrast into the distal esophagus. No bowel obstruction. Appendix is not visualized.  PERITONEUM: No ascites.  VESSELS: Within normal limits.  ABDOMINAL WALL: Within normal limits.  BONES: Degenerative changes.  IMPRESSION:  Circumferential mural thickening of the ascending colon with adjacent enlarged mesenteric lymph nodes. Primary consideration is neoplasm. Colitis is considered less likely. (02 Aug 2020 17:22)    Assessment and Plan:   46 y/o man with PMH of HTN and hepatitis B was admitted on 8/2 with abdominal pain mostly in R mid and lower part of abdomen started the same day. He doesn't have any nausea, vomiting, diarrhea or constipation. Tolerating clear liquid very well. Abdominal CT showed "Circumferential mural thickening of the ascending colon with extension into the terminal ileum" so he was started on cipro+flagyl for colitis. Labs done [place] (MRN # [id number]).  For HBV takes entecavir for 9 years and goes for surveillance [facility] annually except for this year due to COVID.  Never had colonoscopy.   It could be colitis but needs to rule out underlying malignancy as well.   UA noraml  WBC 18k with left shift    Colitis:  - Agree with ciprofloxacin 400mg q12, when able to tolerate regular diet will switch to oral  - Agree with metronidazole 500mg q8h switch to oral when able to tolerate diet  - After treatment, as outpatient, will need colonoscopy to rule out IBD and malignancy  - Trend WBC     HBV:  - Continue Entecavir   - LFTs are normal, TB mild elevation  - HBV VL PCR    Thank you for courtesy of this consult.     Will follow.    Irma Grant MD  Division of Infectious Diseases   [phone]    Addendum:  BC reported with GNR, so will switch to zosyn IV for now. [facility]  INFECTIOUS DISEASES   =======================================================    [id number]  MARY KIM     CC: Colitis     HPI:  44 y/o man with PMH of HTN and hepatitis B was admitted on 8/2 with abdominal pain mostly in R mid and lower part of abdomen started the same day. He doesn't have any nausea, vomiting, diarrhea or constipation. Tolerating clear liquid very well. Abdominal CT showed "Circumferential mural thickening of the ascending colon with extension into the terminal ileum" so he was started on cipro+flagyl for colitis. Labs done [place] (MRN # [id number]) showed leukocytosis of 18k. Never had colitis or any other GI diseases in the past, no abdominal surgery. For HBV takes entecavir for 9eyars and goes for surveillance [facility] annually except for this year due to COVID.  Never had colonoscopy.     PAST MEDICAL & SURGICAL HISTORY:  HTN (hypertension)  No significant past surgical history    Social Hx: No smoking, ETOH or drugs     FAMILY HISTORY: no HBV as per him.     Allergies  ibuprofen (Other)    Antibiotics:  MEDICATIONS  (STANDING):  amLODIPine   Tablet 5 milliGRAM(s) Oral daily  atorvastatin 40 milliGRAM(s) Oral at bedtime  ciprofloxacin   IVPB 400 milliGRAM(s) IV Intermittent every 12 hours  entecavir 0.5 milliGRAM(s) Oral daily  heparin   Injectable 5000 Unit(s) SubCutaneous every 8 hours  metroNIDAZOLE  IVPB 500 milliGRAM(s) IV Intermittent every 8 hours  metroNIDAZOLE  IVPB      valsartan 160 milliGRAM(s) Oral daily    MEDICATIONS  (PRN):  acetaminophen   Tablet .. 650 milliGRAM(s) Oral every 6 hours PRN Temp greater or equal to 38C (100.4F), Mild Pain (1 - 3)  oxyCODONE    IR 5 milliGRAM(s) Oral every 6 hours PRN Moderate Pain (4 - 6)       REVIEW OF SYSTEMS:  CONSTITUTIONAL:  No Fever or chills  HEENT:  No diplopia or blurred vision.  No sore throat or runny nose.  CARDIOVASCULAR:  No chest pain or SOB.  RESPIRATORY:  No cough, shortness of breath, PND or orthopnea.  GASTROINTESTINAL:  No nausea, vomiting or diarrhea.  GENITOURINARY:  No dysuria, frequency or urgency. No Blood in urine  MUSCULOSKELETAL:  no joint aches, no muscle pain  SKIN:  No change in skin, hair or nails.  NEUROLOGIC:  No paresthesias, fasciculations, seizures or weakness.  PSYCHIATRIC:  No disorder of thought or mood.  ENDOCRINE:  No heat or cold intolerance, polyuria or polydipsia.  HEMATOLOGICAL:  No easy bruising or bleeding.     Physical Exam:  Vital Signs Last 24 Hrs  T(C): 36.8 (03 Aug 2020 05:06), Max: 37.6 (02 Aug 2020 20:50)  T(F): 98.3 (03 Aug 2020 05:06), Max: 99.6 (02 Aug 2020 20:50)  HR: 81 (03 Aug 2020 05:06) (77 - 96)  BP: 99/65 (03 Aug 2020 05:06) (99/65 - 124/71)  BP(mean): --  RR: 18 (03 Aug 2020 05:06) (16 - 18)  SpO2: 98% (03 Aug 2020 05:06) (98% - 99%)  Weight (kg): 81.6 (08-02 @ 15:47)  GEN: NAD, obese  HEENT: normocephalic and atraumatic. EOMI. PERRL.    NECK: Supple.  No lymphadenopathy   LUNGS: Clear to auscultation.  HEART: Regular rate and rhythm without murmur.  ABDOMEN: Soft, mild tenderness in RLQ, no rebound tenderness or guarding, and nondistended.  Positive bowel sounds.    : No CVA tenderness  EXTREMITIES: Without any cyanosis, clubbing, rash, lesions or edema.  NEUROLOGIC: grossly intact.  PSYCHIATRIC: Appropriate affect .  SKIN: No ulceration or induration present.    Labs:  Labs are reviewed from [place].     COVID-19 PCR: NotDetec (08-02-20 @ 16:48)    All imaging and other data have been reviewed.  Abdominal CT 8/2:   LOWER CHEST: Within normal limits.  LIVER: Steatosis.  BILE DUCTS: Normal caliber.  GALLBLADDER: Within normal limits.  SPLEEN: Within normal limits.  PANCREAS: Within normal limits.  ADRENALS: Within normal limits.  KIDNEYS/URETERS: Within normal limits.  BLADDER: Within normal limits.  REPRODUCTIVE ORGANS: Prostate within normal limits.  BOWEL/LYMPH NODES: Circumferential mural thickening of the ascending colon with extension into the terminal ileum. Surrounding inflammatory changes. Enlarged mesenteric lymph nodes in the right lower quadrant, measuring up to 2.0 x 1.5 cm. Reflux of contrast into the distal esophagus. No bowel obstruction. Appendix is not visualized.  PERITONEUM: No ascites.  VESSELS: Within normal limits.  ABDOMINAL WALL: Within normal limits.  BONES: Degenerative changes.  IMPRESSION:  Circumferential mural thickening of the ascending colon with adjacent enlarged mesenteric lymph nodes. Primary consideration is neoplasm. Colitis is considered less likely. (02 Aug 2020 17:22)    Assessment and Plan:   44 y/o man with PMH of HTN and hepatitis B was admitted on 8/2 with abdominal pain mostly in R mid and lower part of abdomen started the same day. He doesn't have any nausea, vomiting, diarrhea or constipation. Tolerating clear liquid very well. Abdominal CT showed "Circumferential mural thickening of the ascending colon with extension into the terminal ileum" so he was started on cipro+flagyl for colitis. Labs done [place] (MRN # [id number]).  For HBV takes entecavir for 9 years and goes for surveillance [facility] annually except for this year due to COVID.  Never had colonoscopy.   It could be colitis but needs to rule out underlying malignancy as well.   UA noraml  WBC 18k with left shift    Colitis:  - Agree with ciprofloxacin 400mg q12, when able to tolerate regular diet will switch to oral  - Agree with metronidazole 500mg q8h switch to oral when able to tolerate diet  - After treatment, as outpatient, will need colonoscopy to rule out IBD and malignancy  - Trend WBC     HBV:  - Continue Entecavir   - LFTs are normal, TB mild elevation  - HBV VL PCR    Thank you for courtesy of this consult.     Will follow.    Irma Grant MD  Division of Infectious Diseases   [phone]    Addendum:  BC reported with Gram variable rods, so will switch to zosyn IV for now until ID is back and repeat blood culture.

## 2020-08-03 NOTE — PROGRESS NOTE ADULT - ASSESSMENT
44 yo M with a PMHx of Hepatitis B, HTN, HLD with RLQ pain found to have ascending colon thickening concerning for colitis or neoplasm. 44 yo M with a PMHx of Hepatitis B, HTN, HLD presents with RLQ pain found to have ascending colon thickening concerning for colitis or neoplasm.

## 2020-08-03 NOTE — PROGRESS NOTE ADULT - PROBLEM SELECTOR PLAN 2
stable; c/w Entacopone  Pt's PCP manages his Hepatitis B stable; c/w Entacopone  Pt's PCP manages his Hepatitis B  LFT's WNL

## 2020-08-03 NOTE — CONSULT NOTE ADULT - SUBJECTIVE AND OBJECTIVE BOX
[facility]  Hima Granados PA-C  [address] [place] [address]  [phone]      Chief Complaint:  Patient is a 45y old  Male who presents with a chief complaint of Possible Colitis (03 Aug 2020 11:41)      HPI: Mr Mahmood is a 46 yo M who presented to [place] ED with RLQ pain found to have ascending colon thickening concerning for colitis or neoplasm. PMH Hepatitis B, HTN, HLD.  Pt seen and examined at bedside. He reports 6/10 RLQ pain which began at 2AM. Pain does not radiate elsewhere, he denies any injury to the area, he has decreased PO intake. He also reports Temp of 100.1F at home. Denies any associated chills. Denies headaches, nausea, vomiting, chest pain, SOB, palpitations, constipation, diarrhea, melena, hematochezia, dysuria. Last bowel movement was this morning and reportedly well formed and non-bloody. He has never had a colonoscopy. Denies family hx of colon cancer.     Allergies:  ibuprofen (Other)      Medications:  acetaminophen   Tablet .. 650 milliGRAM(s) Oral every 6 hours PRN  amLODIPine   Tablet 5 milliGRAM(s) Oral daily  atorvastatin 40 milliGRAM(s) Oral at bedtime  entecavir 0.5 milliGRAM(s) Oral daily  heparin   Injectable 5000 Unit(s) SubCutaneous every 8 hours  magnesium sulfate  IVPB 1 Gram(s) IV Intermittent once  oxyCODONE    IR 5 milliGRAM(s) Oral every 6 hours PRN  piperacillin/tazobactam IVPB.. 3.375 Gram(s) IV Intermittent every 8 hours  potassium chloride    Tablet ER 40 milliEquivalent(s) Oral every 4 hours  valsartan 160 milliGRAM(s) Oral daily      PMHX/PSHX:  HTN (hypertension)  No significant past surgical history      Family history:      Social History:     ROS:     General:  No wt loss, fevers, chills, night sweats, fatigue,   Eyes:  Good vision, no reported pain  ENT:  No sore throat, pain, runny nose, dysphagia  CV:  No pain, palpitations, hypo/hypertension  Resp:  No dyspnea, cough, tachypnea, wheezing  GI:  + pain, No nausea, No vomiting, No diarrhea, No constipation, No weight loss, No fever, No pruritis, No rectal bleeding, No tarry stools, No dysphagia,  :  No pain, bleeding, incontinence, nocturia  Muscle:  No pain, weakness  Neuro:  No weakness, tingling, memory problems  Psych:  No fatigue, insomnia, mood problems, depression  Endocrine:  No polyuria, polydipsia, cold/heat intolerance  Heme:  No petechiae, ecchymosis, easy bruisability  Skin:  No rash, tattoos, scars, edema      PHYSICAL EXAM:   Vital Signs:  Vital Signs Last 24 Hrs  T(C): 36.7 (03 Aug 2020 12:20), Max: 37.6 (02 Aug 2020 20:50)  T(F): 98.1 (03 Aug 2020 12:20), Max: 99.6 (02 Aug 2020 20:50)  HR: 87 (03 Aug 2020 12:20) (77 - 96)  BP: 124/72 (03 Aug 2020 12:20) (99/65 - 124/72)  BP(mean): --  RR: 18 (03 Aug 2020 12:20) (16 - 18)  SpO2: 96% (03 Aug 2020 12:20) (96% - 99%)  Daily     Daily     GENERAL:  Appears stated age, well-groomed, well-nourished, no distress  HEENT:  NC/AT,  conjunctivae clear and pink, no thyromegaly, nodules, adenopathy, no JVD, sclera -anicteric  CHEST:  Full & symmetric excursion, no increased effort, breath sounds clear  HEART:  Regular rhythm, S1, S2, no murmur/rub/S3/S4, no abdominal bruit, no edema  ABDOMEN:  Soft, non-tender, non-distended, normoactive bowel sounds,  no masses ,no hepato-splenomegaly, no signs of chronic liver disease  EXTEREMITIES:  no cyanosis,clubbing or edema  SKIN:  No rash/erythema/ecchymoses/petechiae/wounds/abscess/warm/dry  NEURO:  Alert, oriented, no asterixis, no tremor, no encephalopathy    LABS:                        11.0   11.79 )-----------( 237      ( 03 Aug 2020 10:02 )             33.6     08-03    142  |  109<H>  |  10  ----------------------------<  143<H>  3.2<L>   |  27  |  0.89    Ca    8.1<L>      03 Aug 2020 10:02                Imaging:

## 2020-08-03 NOTE — PROGRESS NOTE ADULT - ASSESSMENT
All as above as in PA notation.  Patient personally seen and examined.  RLQ/ right sided abdominal pain.  Abnormal CT scan of colon.  ? colitis verus lesion.  Continuing supportive care.  Awaiting CEA result.  Stool to be sent for PCR.  Colonoscopy pending.  Per his report, pain improved markedly, but not yet resolved...

## 2020-08-03 NOTE — CONSULT NOTE ADULT - PROBLEM SELECTOR RECOMMENDATION 9
unclear if this is malignant versus infectious possibly inflammatory   cont clears  will plan for colonoscopy in am  prep ordered  d/w patient and he agrees  d/w primary

## 2020-08-03 NOTE — PROGRESS NOTE ADULT - PROBLEM SELECTOR PLAN 1
circumferential wall thickening of ascending colon with adjacent LAD  on IV Cipro/Flagyl for possible infectious colitis (WBC 18K yesterday); follow up blood cx  GI eval- will need colonoscopy and biopsy to assess for malignancy, IBD circumferential wall thickening of ascending colon with adjacent LAD  cipro/flagyl switched to IV zosyn given gram variable [place] bacteremia noted on blood culture- f/u final results; leukocytosis improved from 18K->11K  GI eval- will need colonoscopy and biopsy to assess for malignancy, IBD circumferential wall thickening of ascending colon with adjacent LAD  cipro/flagyl switched to IV zosyn given gram variable [place] bacteremia noted on blood culture- f/u final results; leukocytosis improved from 18K->11K  GI eval- will need colonoscopy and biopsy to assess for malignancy, IBD  f/u CEA level  maintain on clear diet for now; NPO  after MN

## 2020-08-04 ENCOUNTER — RESULT REVIEW (OUTPATIENT)
Age: 45
End: 2020-08-04

## 2020-08-04 DIAGNOSIS — K52.9 NONINFECTIVE GASTROENTERITIS AND COLITIS, UNSPECIFIED: ICD-10-CM

## 2020-08-04 LAB
ANION GAP SERPL CALC-SCNC: 7 MMOL/L — SIGNIFICANT CHANGE UP (ref 5–17)
BUN SERPL-MCNC: 10 MG/DL — SIGNIFICANT CHANGE UP (ref 7–23)
CALCIUM SERPL-MCNC: 8.2 MG/DL — LOW (ref 8.5–10.1)
CHLORIDE SERPL-SCNC: 114 MMOL/L — HIGH (ref 96–108)
CO2 SERPL-SCNC: 23 MMOL/L — SIGNIFICANT CHANGE UP (ref 22–31)
CREAT SERPL-MCNC: 0.85 MG/DL — SIGNIFICANT CHANGE UP (ref 0.5–1.3)
CULTURE RESULTS: SIGNIFICANT CHANGE UP
CULTURE RESULTS: SIGNIFICANT CHANGE UP
GLUCOSE SERPL-MCNC: 105 MG/DL — HIGH (ref 70–99)
HCT VFR BLD CALC: 34.7 % — LOW (ref 39–50)
HGB BLD-MCNC: 11.4 G/DL — LOW (ref 13–17)
MAGNESIUM SERPL-MCNC: 2.7 MG/DL — HIGH (ref 1.6–2.6)
MCHC RBC-ENTMCNC: 28.3 PG — SIGNIFICANT CHANGE UP (ref 27–34)
MCHC RBC-ENTMCNC: 32.9 GM/DL — SIGNIFICANT CHANGE UP (ref 32–36)
MCV RBC AUTO: 86.1 FL — SIGNIFICANT CHANGE UP (ref 80–100)
NRBC # BLD: 0 /100 WBCS — SIGNIFICANT CHANGE UP (ref 0–0)
ORGANISM # SPEC MICROSCOPIC CNT: SIGNIFICANT CHANGE UP
ORGANISM # SPEC MICROSCOPIC CNT: SIGNIFICANT CHANGE UP
PHOSPHATE SERPL-MCNC: 2.2 MG/DL — LOW (ref 2.5–4.5)
PLATELET # BLD AUTO: 261 K/UL — SIGNIFICANT CHANGE UP (ref 150–400)
POTASSIUM SERPL-MCNC: 3.8 MMOL/L — SIGNIFICANT CHANGE UP (ref 3.5–5.3)
POTASSIUM SERPL-SCNC: 3.8 MMOL/L — SIGNIFICANT CHANGE UP (ref 3.5–5.3)
RBC # BLD: 4.03 M/UL — LOW (ref 4.2–5.8)
RBC # FLD: 13.3 % — SIGNIFICANT CHANGE UP (ref 10.3–14.5)
SODIUM SERPL-SCNC: 144 MMOL/L — SIGNIFICANT CHANGE UP (ref 135–145)
SPECIMEN SOURCE: SIGNIFICANT CHANGE UP
SPECIMEN SOURCE: SIGNIFICANT CHANGE UP
WBC # BLD: 12.05 K/UL — HIGH (ref 3.8–10.5)
WBC # FLD AUTO: 12.05 K/UL — HIGH (ref 3.8–10.5)

## 2020-08-04 PROCEDURE — 88305 TISSUE EXAM BY PATHOLOGIST: CPT | Mod: 26

## 2020-08-04 PROCEDURE — 99232 SBSQ HOSP IP/OBS MODERATE 35: CPT

## 2020-08-04 PROCEDURE — 99233 SBSQ HOSP IP/OBS HIGH 50: CPT

## 2020-08-04 RX ORDER — POTASSIUM PHOSPHATE, MONOBASIC POTASSIUM PHOSPHATE, DIBASIC 236; 224 MG/ML; MG/ML
15 INJECTION, SOLUTION INTRAVENOUS ONCE
Refills: 0 | Status: COMPLETED | OUTPATIENT
Start: 2020-08-04 | End: 2020-08-04

## 2020-08-04 RX ADMIN — PIPERACILLIN AND TAZOBACTAM 25 GRAM(S): 4; .5 INJECTION, POWDER, LYOPHILIZED, FOR SOLUTION INTRAVENOUS at 13:42

## 2020-08-04 RX ADMIN — PIPERACILLIN AND TAZOBACTAM 25 GRAM(S): 4; .5 INJECTION, POWDER, LYOPHILIZED, FOR SOLUTION INTRAVENOUS at 21:21

## 2020-08-04 RX ADMIN — PIPERACILLIN AND TAZOBACTAM 25 GRAM(S): 4; .5 INJECTION, POWDER, LYOPHILIZED, FOR SOLUTION INTRAVENOUS at 05:19

## 2020-08-04 RX ADMIN — SODIUM CHLORIDE 75 MILLILITER(S): 9 INJECTION INTRAMUSCULAR; INTRAVENOUS; SUBCUTANEOUS at 05:18

## 2020-08-04 RX ADMIN — ENTECAVIR 0.5 MILLIGRAM(S): 0.5 TABLET ORAL at 13:41

## 2020-08-04 RX ADMIN — ATORVASTATIN CALCIUM 40 MILLIGRAM(S): 80 TABLET, FILM COATED ORAL at 21:21

## 2020-08-04 RX ADMIN — POTASSIUM PHOSPHATE, MONOBASIC POTASSIUM PHOSPHATE, DIBASIC 62.5 MILLIMOLE(S): 236; 224 INJECTION, SOLUTION INTRAVENOUS at 18:11

## 2020-08-04 NOTE — PROGRESS NOTE ADULT - PROBLEM SELECTOR PLAN 1
circumferential wall thickening of ascending colon with adjacent LAD  c/w IV Zosyn; awaiting speciation of gram variable rods on initial blood cx; f/u repeat blood cultures; WBC stable at ~12K. Pt remains afebrile and non-toxic  For colonoscopy today  f/u CEA level circumferential wall thickening of ascending colon with adjacent LAD  c/w IV Zosyn; noted Clostridium septicum bacteremia which is associated with colon cancer; f/u repeat blood cultures; WBC stable at ~12K. Pt remains afebrile and non-toxic  Colonoscopy with identified lesion, partially obstructing- will d/w Sx regarding colonic resection- biospies performed; f/u path  f/u CEA level circumferential wall thickening of ascending colon with adjacent LAD  c/w IV Zosyn; noted Clostridium septicum bacteremia which is associated with colon cancer; f/u repeat blood cultures; WBC stable at ~12K. Pt remains afebrile and non-toxic  Colonoscopy with identified obstructing mass s/p biopsy- will d/w Sx regarding colonic resection- biospy performed; f/u path  clears only for now  f/u CEA level

## 2020-08-04 NOTE — PROGRESS NOTE ADULT - SUBJECTIVE AND OBJECTIVE BOX
Contact Number: [phone]    Patient is a 45y old  Male who presents with a chief complaint of Possible Colitis (04 Aug 2020 08:02)      SUBJECTIVE / OVERNIGHT EVENTS: afebrile; denies CP, SOB, N/V; tolerated bowel prep with slightly worsening of r-sided abdominal pain. No blood noted in stool. Pain controlled with tylenol    MEDICATIONS  (STANDING):  amLODIPine   Tablet 5 milliGRAM(s) Oral daily  atorvastatin 40 milliGRAM(s) Oral at bedtime  entecavir 0.5 milliGRAM(s) Oral daily  heparin   Injectable 5000 Unit(s) SubCutaneous every 8 hours  piperacillin/tazobactam IVPB.. 3.375 Gram(s) IV Intermittent every 8 hours  valsartan 160 milliGRAM(s) Oral daily    MEDICATIONS  (PRN):  acetaminophen   Tablet .. 650 milliGRAM(s) Oral every 6 hours PRN Temp greater or equal to 38C (100.4F), Mild Pain (1 - 3)  oxyCODONE    IR 5 milliGRAM(s) Oral every 6 hours PRN Moderate Pain (4 - 6)      Vital Signs Last 24 Hrs  T(C): 36.9 (04 Aug 2020 04:56), Max: 37.3 (03 Aug 2020 22:23)  T(F): 98.5 (04 Aug 2020 04:56), Max: 99.2 (03 Aug 2020 22:23)  HR: 84 (04 Aug 2020 04:56) (84 - 88)  BP: 103/69 (04 Aug 2020 04:56) (103/69 - 124/72)  BP(mean): --  RR: 19 (04 Aug 2020 04:56) (16 - 19)  SpO2: 96% (04 Aug 2020 04:56) (96% - 98%)  CAPILLARY BLOOD GLUCOSE        I&O's Summary    03 Aug 2020 07:01  -  04 Aug 2020 07:00  --------------------------------------------------------  IN: 200 mL / OUT: 0 mL / NET: 200 mL        PHYSICAL EXAM:  GENERAL: NAD, well-developed  EYES: EOMI, conjunctiva and sclera clear  NECK: Supple, No JVD  CHEST/LUNG: Clear to auscultation bilaterally; No wheeze  HEART: Regular rate and rhythm  ABDOMEN: Soft, r-sided tenderness; no guarding; no rebound tenderness; Bowel sounds present  EXTREMITIES:  No clubbing, cyanosis, or edema  PSYCH: AAOx3  NEUROLOGY: no gross sensory deficits  Muscle strength 5/5 b/l UE and LE   Speech: fluent  no facial asymmetry      LABS:                        11.4   12.05 )-----------( 261      ( 04 Aug 2020 08:40 )             34.7     08-04    144  |  114<H>  |  10  ----------------------------<  105<H>  3.8   |  23  |  0.85    Ca    8.2<L>      04 Aug 2020 08:40  Phos  2.2     08-04  Mg     2.7     08-04    TPro  8.2  /  Alb  4.4  /  TBili  1.4<H>  /  DBili  x   /  AST  19  /  ALT  31  /  AlkPhos  75  08-02    LIVER FUNCTIONS - ( 02 Aug 2020 11:57 )  Alb: 4.4 g/dL / Pro: 8.2 g/dL / ALK PHOS: 75 U/L / ALT: 31 U/L DA / AST: 19 U/L / GGT: x           PT/INR - ( 02 Aug 2020 11:57 )   PT: 12.1 sec;   INR: 1.00 ratio         PTT - ( 02 Aug 2020 11:57 )  PTT:32.9 sec      Urinalysis Basic - ( 02 Aug 2020 11:57 )    Color: Yellow / Appearance: Clear / S.015 / pH: x  Gluc: x / Ketone: Trace  / Bili: Negative / Urobili: Negative mg/dL   Blood: x / Protein: 15 mg/dL / Nitrite: Negative   Leuk Esterase: Negative / RBC: x / WBC 0-2   Sq Epi: x / Non Sq Epi: Occasional / Bacteria: Occasional          GI PCR Panel, Stool (collected 04 Aug 2020 02:37)  Source: .Stool Feces  Final Report (04 Aug 2020 06:21):    GI PCR Results: NOT detected    *******Please Note:*******    GI panel PCR evaluates for:    Campylobacter, Plesiomonas shigelloides, Salmonella,    Vibrio, Yersinia enterocolitica, Enteroaggregative    Escherichia coli (EAEC), Enteropathogenic E.coli (EPEC),    Enterotoxigenic E. coli (ETEC) lt/st, Shiga-like    toxin-producing E. coli (STEC) stx1/stx2,    Shigella/ Enteroinvasive E. coli (EIEC), Cryptosporidium,    Cyclospora cayetanensis, Entamoeba histolytica,    Giardia lamblia, Adenovirus F 40/41, Astrovirus,    Norovirus GI/GII, Rotavirus A, Sapovirus    Culture - Blood (collected 02 Aug 2020 21:12)  Source: .Blood Blood  Preliminary Report (03 Aug 2020 22:01):    No growth to date.    Culture - Blood (collected 02 Aug 2020 21:12)  Source: .Blood Blood  Gram Stain (03 Aug 2020 11:05):    Growth in anaerobic bottle: Gram Variable Rods  Preliminary Report (03 Aug 2020 11:07):    Growth in anaerobic bottle: Gram Variable Rods    "Due to technical problems, Proteus sp. will Not be reported as part of    the BCID panel until further notice"    ***Blood Panel PCR results on this specimen are available    approximately 3 hours after the Gram stain result.***    Gram stain, PCR, and/or culture results may not always    correspond due to difference in methodologies.    ************************************************************    This PCR assay was performed using [employer].    The following targets are tested for: Enterococcus,    vancomycin resistant enterococci, Listeria monocytogenes,    coagulase negative staphylococci, S. aureus,    methicillin resistant S. aureus, Streptococcus agalactiae    (Group B), S. pneumoniae, S. pyogenes (Group A),    Acinetobacter baumannii, Enterobacter cloacae, E. coli,    Klebsiella oxytoca, K. pneumoniae, Proteus sp.,    Serratia marcescens, Haemophilus influenzae,    Neisseria meningitidis, Pseudomonas aeruginosa, Candida    albicans, C. glabrata, C krusei, C parapsilosis,    C. tropicalis and the KPC resistance gene.  Organism: Blood Culture PCR (03 Aug 2020 12:52)  Organism: Blood Culture PCR (03 Aug 2020 12:52)    Culture - Urine (collected 02 Aug 2020 20:45)  Source: .Urine Clean Catch (Midstream)  Final Report (03 Aug 2020 15:48):    <10,000 CFU/mL Normal Urogenital Mary      RADIOLOGY & ADDITIONAL TESTS:    Imaging Personally Reviewed:    Consultant(s) Notes Reviewed:      Care Discussed with Consultants/Other Providers: Contact Number: [phone]    Patient is a 45y old  Male who presents with a chief complaint of Possible Colitis (04 Aug 2020 08:02)      SUBJECTIVE / OVERNIGHT EVENTS: afebrile; denies CP, SOB, N/V; tolerated bowel prep with slightly worsening of r-sided abdominal pain. No blood noted in stool. Pain controlled with tylenol    MEDICATIONS  (STANDING):  amLODIPine   Tablet 5 milliGRAM(s) Oral daily  atorvastatin 40 milliGRAM(s) Oral at bedtime  entecavir 0.5 milliGRAM(s) Oral daily  heparin   Injectable 5000 Unit(s) SubCutaneous every 8 hours  piperacillin/tazobactam IVPB.. 3.375 Gram(s) IV Intermittent every 8 hours  valsartan 160 milliGRAM(s) Oral daily    MEDICATIONS  (PRN):  acetaminophen   Tablet .. 650 milliGRAM(s) Oral every 6 hours PRN Temp greater or equal to 38C (100.4F), Mild Pain (1 - 3)  oxyCODONE    IR 5 milliGRAM(s) Oral every 6 hours PRN Moderate Pain (4 - 6)      Vital Signs Last 24 Hrs  T(C): 36.9 (04 Aug 2020 04:56), Max: 37.3 (03 Aug 2020 22:23)  T(F): 98.5 (04 Aug 2020 04:56), Max: 99.2 (03 Aug 2020 22:23)  HR: 84 (04 Aug 2020 04:56) (84 - 88)  BP: 103/69 (04 Aug 2020 04:56) (103/69 - 124/72)  BP(mean): --  RR: 19 (04 Aug 2020 04:56) (16 - 19)  SpO2: 96% (04 Aug 2020 04:56) (96% - 98%)  CAPILLARY BLOOD GLUCOSE        I&O's Summary    03 Aug 2020 07:01  -  04 Aug 2020 07:00  --------------------------------------------------------  IN: 200 mL / OUT: 0 mL / NET: 200 mL        PHYSICAL EXAM:  GENERAL: NAD, well-developed  EYES: EOMI, conjunctiva and sclera clear  NECK: Supple, No JVD  CHEST/LUNG: Clear to auscultation bilaterally; No wheeze  HEART: Regular rate and rhythm  ABDOMEN: Soft, r-sided tenderness; no guarding; no rebound tenderness; Bowel sounds present  EXTREMITIES:  No clubbing, cyanosis, or edema  PSYCH: AAOx3  NEUROLOGY: no gross sensory deficits  Muscle strength 5/5 b/l UE and LE   Speech: fluent  no facial asymmetry      LABS:                        11.4   12.05 )-----------( 261      ( 04 Aug 2020 08:40 )             34.7     08-04    144  |  114<H>  |  10  ----------------------------<  105<H>  3.8   |  23  |  0.85    Ca    8.2<L>      04 Aug 2020 08:40  Phos  2.2     08-04  Mg     2.7     08-04    TPro  8.2  /  Alb  4.4  /  TBili  1.4<H>  /  DBili  x   /  AST  19  /  ALT  31  /  AlkPhos  75  08-02    LIVER FUNCTIONS - ( 02 Aug 2020 11:57 )  Alb: 4.4 g/dL / Pro: 8.2 g/dL / ALK PHOS: 75 U/L / ALT: 31 U/L DA / AST: 19 U/L / GGT: x           PT/INR - ( 02 Aug 2020 11:57 )   PT: 12.1 sec;   INR: 1.00 ratio         PTT - ( 02 Aug 2020 11:57 )  PTT:32.9 sec      Urinalysis Basic - ( 02 Aug 2020 11:57 )    Color: Yellow / Appearance: Clear / S.015 / pH: x  Gluc: x / Ketone: Trace  / Bili: Negative / Urobili: Negative mg/dL   Blood: x / Protein: 15 mg/dL / Nitrite: Negative   Leuk Esterase: Negative / RBC: x / WBC 0-2   Sq Epi: x / Non Sq Epi: Occasional / Bacteria: Occasional          GI PCR Panel, Stool (collected 04 Aug 2020 02:37)  Source: .Stool Feces  Final Report (04 Aug 2020 06:21):    GI PCR Results: NOT detected    *******Please Note:*******    GI panel PCR evaluates for:    Campylobacter, Plesiomonas shigelloides, Salmonella,    Vibrio, Yersinia enterocolitica, Enteroaggregative    Escherichia coli (EAEC), Enteropathogenic E.coli (EPEC),    Enterotoxigenic E. coli (ETEC) lt/st, Shiga-like    toxin-producing E. coli (STEC) stx1/stx2,    Shigella/ Enteroinvasive E. coli (EIEC), Cryptosporidium,    Cyclospora cayetanensis, Entamoeba histolytica,    Giardia lamblia, Adenovirus F 40/41, Astrovirus,    Norovirus GI/GII, Rotavirus A, Sapovirus    Culture - Blood (collected 02 Aug 2020 21:12)  Source: .Blood Blood  Preliminary Report (03 Aug 2020 22:01):    No growth to date.    Culture - Blood (collected 02 Aug 2020 21:12)  Source: .Blood Blood  Gram Stain (03 Aug 2020 11:05):    Growth in anaerobic bottle: Gram Variable Rods  Preliminary Report (03 Aug 2020 11:07):    Growth in anaerobic bottle: Gram Variable Rods    "Due to technical problems, Proteus sp. will Not be reported as part of    the BCID panel until further notice"    ***Blood Panel PCR results on this specimen are available    approximately 3 hours after the Gram stain result.***    Gram stain, PCR, and/or culture results may not always    correspond due to difference in methodologies.    ************************************************************    This PCR assay was performed using [employer].    The following targets are tested for: Enterococcus,    vancomycin resistant enterococci, Listeria monocytogenes,    coagulase negative staphylococci, S. aureus,    methicillin resistant S. aureus, Streptococcus agalactiae    (Group B), S. pneumoniae, S. pyogenes (Group A),    Acinetobacter baumannii, Enterobacter cloacae, E. coli,    Klebsiella oxytoca, K. pneumoniae, Proteus sp.,    Serratia marcescens, Haemophilus influenzae,    Neisseria meningitidis, Pseudomonas aeruginosa, Candida    albicans, C. glabrata, C krusei, C parapsilosis,    C. tropicalis and the KPC resistance gene.  Organism: Blood Culture PCR (03 Aug 2020 12:52)  Organism: Blood Culture PCR (03 Aug 2020 12:52)    Culture - Urine (collected 02 Aug 2020 20:45)  Source: .Urine Clean Catch (Midstream)  Final Report (03 Aug 2020 15:48):    <10,000 CFU/mL Normal Urogenital Mayr      RADIOLOGY & ADDITIONAL TESTS:    Imaging Personally Reviewed:    Consultant(s) Notes Reviewed:      Care Discussed with Consultants/Other Providers: Dr. Grant regarding IV abx given clostridium septicum bacteremia and Dr. Easley regarding partially obstructing colonic lesion found and need for surgical follow up

## 2020-08-04 NOTE — PROGRESS NOTE ADULT - ASSESSMENT
All as above in PA notation.  Patient personally seen and examined.  Gi PCR negative.  CEA mildly elevated.   Colonoscopy today reveals mass.  Biopsy pending.  Please leave on clears for now.  Will follow-up on Pathology.  Resection recommended.  R/B/N of colectomy reviewed preliminarily but in detail.  In interim, to continue supportive care.

## 2020-08-04 NOTE — PROGRESS NOTE ADULT - SUBJECTIVE AND OBJECTIVE BOX
s.p colonoscopy    large obstructing mass in ascending colon  s/p biopsy    rec:   clear liquid diet  will likely need surgery irregardless of pathology given obstruction  d/w patient

## 2020-08-04 NOTE — PROGRESS NOTE ADULT - ASSESSMENT
46 yo M with a PMHx of Hepatitis B, HTN, HLD presents with RLQ pain found to have ascending colon thickening concerning for colitis or neoplasm.

## 2020-08-04 NOTE — PROGRESS NOTE ADULT - SUBJECTIVE AND OBJECTIVE BOX
Patient seen and examined bedside resting comfortably,  persistent right abdominal pain  but improved since yesterday well  controlled by Tylenol intake only,  +ve BM, tolerating clears, no N/V, stool PCR resulted negative at this time     T(F): 98.5 (08-04-20 @ 04:56), Max: 99.2 (08-03-20 @ 22:23)  HR: 84 (08-04-20 @ 04:56) (84 - 88)  BP: 103/69 (08-04-20 @ 04:56) (103/69 - 124/72)  RR: 19 (08-04-20 @ 04:56) (16 - 19)  SpO2: 96% (08-04-20 @ 04:56) (96% - 98%)  Wt(kg): --  CAPILLARY BLOOD GLUCOSE          PHYSICAL EXAM:  General: NAD, WDWN.   Neuro:  Alert & oriented x 3  CV: +S1+S2 regular rate and rhythm  Lung: clear to ausculation bilaterally, respirations nonlabored, good inspiratory effort  Abdomen: soft, mild right sided tenderness on palpation , +ve BS at times high pitched,   Extremities: no pedal edema or calf tenderness noted       LABS:                        11.0   11.79 )-----------( 237      ( 03 Aug 2020 10:02 )             33.6     08-03    142  |  109<H>  |  10  ----------------------------<  143<H>  3.2<L>   |  27  |  0.89    Ca    8.1<L>      03 Aug 2020 10:02    TPro  8.2  /  Alb  4.4  /  TBili  1.4<H>  /  DBili  x   /  AST  19  /  ALT  31  /  AlkPhos  75  08-02    PT/INR - ( 02 Aug 2020 11:57 )   PT: 12.1 sec;   INR: 1.00 ratio         PTT - ( 02 Aug 2020 11:57 )  PTT:32.9 sec  I&O's Detail    03 Aug 2020 07:01  -  04 Aug 2020 07:00  --------------------------------------------------------  IN:    Solution: 200 mL  Total IN: 200 mL    OUT:  Total OUT: 0 mL    Total NET: 200 mL          Impression: 45y Male admitted with Crohn's disease of large intestine without complication    PMH HTN (hypertension)  HTN (hypertension)  Hepatitis B      Plan:  -for colonoscopy today  will f/u post procedure with result Patient seen and examined bedside resting comfortably,  persistent right abdominal pain slightly worse with the bowel prep intake , yet controlled by Tylenol intake only,  +ve BM, tolerated  clears yesterday NPO for colonoscopy today, no N/V.     T(F): 98.5 (08-04-20 @ 04:56), Max: 99.2 (08-03-20 @ 22:23)  HR: 84 (08-04-20 @ 04:56) (84 - 88)  BP: 103/69 (08-04-20 @ 04:56) (103/69 - 124/72)  RR: 19 (08-04-20 @ 04:56) (16 - 19)  SpO2: 96% (08-04-20 @ 04:56) (96% - 98%)  Wt(kg): --  CAPILLARY BLOOD GLUCOSE          PHYSICAL EXAM:  General: NAD, WDWN.   Neuro:  Alert & oriented x 3  CV: +S1+S2 regular rate and rhythm  Lung: clear to ausculation bilaterally, respirations nonlabored, good inspiratory effort  Abdomen: soft, moderate  right sided tenderness on palpation , +ve BS at times high pitched,   Extremities: no pedal edema or calf tenderness noted       LABS:                        11.0   11.79 )-----------( 237      ( 03 Aug 2020 10:02 )             33.6     08-03    142  |  109<H>  |  10  ----------------------------<  143<H>  3.2<L>   |  27  |  0.89    Ca    8.1<L>      03 Aug 2020 10:02    TPro  8.2  /  Alb  4.4  /  TBili  1.4<H>  /  DBili  x   /  AST  19  /  ALT  31  /  AlkPhos  75  08-02    PT/INR - ( 02 Aug 2020 11:57 )   PT: 12.1 sec;   INR: 1.00 ratio         PTT - ( 02 Aug 2020 11:57 )  PTT:32.9 sec  I&O's Detail    03 Aug 2020 07:01  -  04 Aug 2020 07:00  --------------------------------------------------------  IN:    Solution: 200 mL  Total IN: 200 mL    OUT:  Total OUT: 0 mL    Total NET: 200 mL          Impression: 45y Male admitted with Crohn's disease of large intestine without complication    PMH HTN (hypertension)  HTN (hypertension)  Hepatitis B      Plan:  stool PCR resulted negative  -for colonoscopy today  will f/u post procedure with result Patient seen and examined bedside resting comfortably,  persistent right abdominal pain slightly worse with the bowel prep intake , yet controlled by Tylenol intake only,  +ve BM, tolerated  clears yesterday NPO for colonoscopy today, no N/V.       T(F): 98.5 (08-04-20 @ 04:56), Max: 99.2 (08-03-20 @ 22:23)  HR: 84 (08-04-20 @ 04:56) (84 - 88)  BP: 103/69 (08-04-20 @ 04:56) (103/69 - 124/72)  RR: 19 (08-04-20 @ 04:56) (16 - 19)  SpO2: 96% (08-04-20 @ 04:56) (96% - 98%)      PHYSICAL EXAM:  General: NAD, WDWN.   Neuro:  Alert & oriented x 3  CV: +S1+S2 regular rate and rhythm  Lung: clear to ausculation bilaterally, respirations nonlabored, good inspiratory effort  Abdomen: soft, moderate  right sided tenderness on palpation , +ve BS at times high pitched,   Extremities: no pedal edema or calf tenderness noted       LABS:                        11.0   11.79 )-----------( 237      ( 03 Aug 2020 10:02 )             33.6     08-03    142  |  109<H>  |  10  ----------------------------<  143<H>  3.2<L>   |  27  |  0.89    Ca    8.1<L>      03 Aug 2020 10:02    TPro  8.2  /  Alb  4.4  /  TBili  1.4<H>  /  DBili  x   /  AST  19  /  ALT  31  /  AlkPhos  75  08-02    PT/INR - ( 02 Aug 2020 11:57 )   PT: 12.1 sec;   INR: 1.00 ratio    PTT - ( 02 Aug 2020 11:57 )  PTT:32.9 sec    I&O's Detail    03 Aug 2020 07:01  -  04 Aug 2020 07:00  --------------------------------------------------------  IN:    Solution: 200 mL  Total IN: 200 mL    OUT:  Total OUT: 0 mL    Total NET: 200 mL      Impression: 45y Male admitted with Crohn's disease of large intestine without complication    PMH HTN (hypertension)  HTN (hypertension)  Hepatitis B      Plan:  stool PCR resulted negative  -for colonoscopy today  will f/u post procedure with result

## 2020-08-04 NOTE — PROGRESS NOTE ADULT - SUBJECTIVE AND OBJECTIVE BOX
[facility]  INFECTIOUS DISEASES   =======================================================    [facility]-[id number]  MARY KIM     Follow up: Bacteremia and colitis     No fever, pain is better, yesterday had more pain after drinking Golytely to prepare for colonoscopy today.   Tody pain is better. No nausea or vomiting.     PAST MEDICAL & SURGICAL HISTORY:  HTN (hypertension)  No significant past surgical history    Social Hx: No smoking, ETOH or drugs     FAMILY HISTORY: no HBV as per him.     Allergies  ibuprofen (Other)    Antibiotics:  Metronidazole   entecavir 0.5 milliGRAM(s) Oral daily    REVIEW OF SYSTEMS:  CONSTITUTIONAL:  No Fever or chills  HEENT:  No diplopia or blurred vision.  No sore throat or runny nose.  CARDIOVASCULAR:  No chest pain or SOB.  RESPIRATORY:  No cough, shortness of breath, PND or orthopnea.  GASTROINTESTINAL:  No nausea, vomiting or diarrhea.  GENITOURINARY:  No dysuria, frequency or urgency. No Blood in urine  MUSCULOSKELETAL:  no joint aches, no muscle pain  SKIN:  No change in skin, hair or nails.  NEUROLOGIC:  No paresthesias, fasciculations, seizures or weakness.  PSYCHIATRIC:  No disorder of thought or mood.  ENDOCRINE:  No heat or cold intolerance, polyuria or polydipsia.  HEMATOLOGICAL:  No easy bruising or bleeding.     Physical Exam:  Vital Signs Last 24 Hrs  T(C): 36.9 (04 Aug 2020 04:56), Max: 37.3 (03 Aug 2020 22:23)  T(F): 98.5 (04 Aug 2020 04:56), Max: 99.2 (03 Aug 2020 22:23)  HR: 84 (04 Aug 2020 04:56) (84 - 88)  BP: 103/69 (04 Aug 2020 04:56) (103/69 - 124/72)  RR: 19 (04 Aug 2020 04:56) (16 - 19)  SpO2: 96% (04 Aug 2020 04:56) (96% - 98%)  GEN: NAD, obese  HEENT: normocephalic and atraumatic. EOMI. PERRL.    NECK: Supple.  No lymphadenopathy   LUNGS: Clear to auscultation.  HEART: Regular rate and rhythm without murmur.  ABDOMEN: Soft, mild tenderness in RLQ, no rebound tenderness or guarding, and nondistended.  Positive bowel sounds.    : No CVA tenderness  EXTREMITIES: Without any cyanosis, clubbing, rash, lesions or edema.  NEUROLOGIC: grossly intact.  PSYCHIATRIC: Appropriate affect .  SKIN: No ulceration or induration present.      Labs:                        11.4   12.05 )-----------( 261      ( 04 Aug 2020 08:40 )             34.7      08-04    144  |  114<H>  |  10  ----------------------------<  105<H>  3.8   |  23  |  0.85    Ca    8.2<L>      04 Aug 2020 08:40  Phos  2.2     08-04  Mg     2.7     08-04    TPro  8.2  /  Alb  4.4  /  TBili  1.4<H>  /  DBili  x   /  AST  19  /  ALT  31  /  AlkPhos  75  08-02    GI PCR Panel, Stool (collected 08-04-20 @ 02:37)  Source: .Stool Feces  Final Report (08-04-20 @ 06:21):    GI PCR Results: NOT detected    Culture - Blood (collected 08-02-20 @ 21:12)  Source: .Blood Blood    Culture - Blood (collected 08-02-20 @ 21:12)  Source: .Blood Blood  Gram Stain (08-03-20 @ 11:05):    Growth in anaerobic bottle: Gram Variable Rods  Organism: Blood Culture PCR (08-03-20 @ 12:52)  Organism: Blood Culture PCR (08-03-20 @ 12:52)    Sensitivities:      -  Acinetobacter baumanii: Nondet      -  Candida albicans: Nondet      -  Candida glabrata: Nondet      -  Candida krusei: Nondet      -  Candida parapsilosis: Nondet      -  Candida tropicalis: Nondet      -  Coagulase negative Staphylococcus: Nondet      -  Enterobacter cloacae complex: Nondet      -  Enterococcus species: Nondet      -  Escherichia coli: Nondet      -  Haemophilus influenzae: Nondet      -  Klebsiella oxytoca: Nondet      -  Klebsiella pneumoniae: Nondet      -  Listeria monocytogenes: Nondet      -  Methicillin resistant Staphylococcus aureus (MRSA): Nondet      -  Multidrug (KPC pos) resistant organism: Nondet      -  Neisseria meningitidis: Nondet      -  Pseudomonas aeruginosa: Nondet      -  Serratia marcescens: Nondet      -  Staphylococcus aureus: Nondet      -  Streptococcus agalactiae (Group B): Nondet      -  Streptococcus pneumoniae: Nondet      -  Streptococcus pyogenes (Group A): Nondet      -  Streptococcus sp. (Not Grp A, B or S pneumoniae): Nondet      -  Vancomycin resistant Enterococcus sp.: Nondet      Method Type: PCR    Culture - Urine (collected 08-02-20 @ 20:45)  Source: .Urine Clean Catch (Midstream)  Final Report (08-03-20 @ 15:48):    <10,000 CFU/mL Normal Urogenital Mary    WBC Count: 12.05 K/uL (08-04-20 @ 08:40)  WBC Count: 11.79 K/uL (08-03-20 @ 10:02)  WBC Count: 18.20 K/uL (08-02-20 @ 11:57)    Creatinine, Serum: 0.85 mg/dL (08-04-20 @ 08:40)  Creatinine, Serum: 0.89 mg/dL (08-03-20 @ 10:02)  Creatinine, Serum: 1.18 mg/dL (08-02-20 @ 11:57)    COVID-19 PCR: NotDetec (08-02-20 @ 16:48)    All imaging and other data have been reviewed.  Abdominal CT 8/2:   LOWER CHEST: Within normal limits.  LIVER: Steatosis.  BILE DUCTS: Normal caliber.  GALLBLADDER: Within normal limits.  SPLEEN: Within normal limits.  PANCREAS: Within normal limits.  ADRENALS: Within normal limits.  KIDNEYS/URETERS: Within normal limits.  BLADDER: Within normal limits.  REPRODUCTIVE ORGANS: Prostate within normal limits.  BOWEL/LYMPH NODES: Circumferential mural thickening of the ascending colon with extension into the terminal ileum. Surrounding inflammatory changes. Enlarged mesenteric lymph nodes in the right lower quadrant, measuring up to 2.0 x 1.5 cm. Reflux of contrast into the distal esophagus. No bowel obstruction. Appendix is not visualized.  PERITONEUM: No ascites.  VESSELS: Within normal limits.  ABDOMINAL WALL: Within normal limits.  BONES: Degenerative changes.  IMPRESSION:  Circumferential mural thickening of the ascending colon with adjacent enlarged mesenteric lymph nodes. Primary consideration is neoplasm. Colitis is considered less likely. (02 Aug 2020 17:22)    Assessment and Plan:   44 y/o man with PMH of HTN and hepatitis B was admitted on 8/2 with abdominal pain mostly in R mid and lower part of abdomen started the same day. He doesn't have any nausea, vomiting, diarrhea or constipation. Tolerating clear liquid very well. Abdominal CT showed "Circumferential mural thickening of the ascending colon with extension into the terminal ileum" so he was started on cipro+flagyl for colitis. Labs done [facility] (MRN # [id number]).  For HBV takes entecavir for 9 years and goes for surveillance [facility] annually except for this year due to COVID.  Never had colonoscopy.   It could be colitis but needs to rule out underlying malignancy as well.   UA normal.  WBC 18k with left shift    Colitis/bacteremia:  - Blood culture with gram variable [place], will follow ID and sensitivity  - As per [email or web address] lab, it was sent to [facility] and will takes very long time to get the results    - Repeat blood culture pending   - Continue Zosyn 3.375gm q8h   - Going for colonoscopy today   - Trend WBC, 18k-->12k  - Based on his response and repeat blood culture will decide about switching to oral.     HBV:  - Continue Entecavir   - LFTs are normal, TB mild elevation  - HBV VL PCR pending    Will follow.    Irma Grant MD  Division of Infectious Diseases   [phone] [facility]  INFECTIOUS DISEASES   =======================================================    [facility]-[id number]  MARY KIM     Follow up: Bacteremia and colitis     No fever, pain is better, yesterday had more pain after drinking Golytely to prepare for colonoscopy today.   Tody pain is better. No nausea or vomiting.     PAST MEDICAL & SURGICAL HISTORY:  HTN (hypertension)  No significant past surgical history    Social Hx: No smoking, ETOH or drugs     FAMILY HISTORY: no HBV as per him.     Allergies  ibuprofen (Other)    Antibiotics:  Metronidazole   entecavir 0.5 milliGRAM(s) Oral daily    REVIEW OF SYSTEMS:  CONSTITUTIONAL:  No Fever or chills  HEENT:  No diplopia or blurred vision.  No sore throat or runny nose.  CARDIOVASCULAR:  No chest pain or SOB.  RESPIRATORY:  No cough, shortness of breath, PND or orthopnea.  GASTROINTESTINAL:  No nausea, vomiting or diarrhea.  GENITOURINARY:  No dysuria, frequency or urgency. No Blood in urine  MUSCULOSKELETAL:  no joint aches, no muscle pain  SKIN:  No change in skin, hair or nails.  NEUROLOGIC:  No paresthesias, fasciculations, seizures or weakness.  PSYCHIATRIC:  No disorder of thought or mood.  ENDOCRINE:  No heat or cold intolerance, polyuria or polydipsia.  HEMATOLOGICAL:  No easy bruising or bleeding.     Physical Exam:  Vital Signs Last 24 Hrs  T(C): 36.9 (04 Aug 2020 04:56), Max: 37.3 (03 Aug 2020 22:23)  T(F): 98.5 (04 Aug 2020 04:56), Max: 99.2 (03 Aug 2020 22:23)  HR: 84 (04 Aug 2020 04:56) (84 - 88)  BP: 103/69 (04 Aug 2020 04:56) (103/69 - 124/72)  RR: 19 (04 Aug 2020 04:56) (16 - 19)  SpO2: 96% (04 Aug 2020 04:56) (96% - 98%)  GEN: NAD, obese  HEENT: normocephalic and atraumatic. EOMI. PERRL.    NECK: Supple.  No lymphadenopathy   LUNGS: Clear to auscultation.  HEART: Regular rate and rhythm without murmur.  ABDOMEN: Soft, mild tenderness in RLQ, no rebound tenderness or guarding, and nondistended.  Positive bowel sounds.    : No CVA tenderness  EXTREMITIES: Without any cyanosis, clubbing, rash, lesions or edema.  NEUROLOGIC: grossly intact.  PSYCHIATRIC: Appropriate affect .  SKIN: No ulceration or induration present.      Labs:                        11.4   12.05 )-----------( 261      ( 04 Aug 2020 08:40 )             34.7      08-04    144  |  114<H>  |  10  ----------------------------<  105<H>  3.8   |  23  |  0.85    Ca    8.2<L>      04 Aug 2020 08:40  Phos  2.2     08-04  Mg     2.7     08-04    TPro  8.2  /  Alb  4.4  /  TBili  1.4<H>  /  DBili  x   /  AST  19  /  ALT  31  /  AlkPhos  75  08-02    GI PCR Panel, Stool (collected 08-04-20 @ 02:37)  Source: .Stool Feces  Final Report (08-04-20 @ 06:21):    GI PCR Results: NOT detected    Culture - Blood (collected 08-02-20 @ 21:12)  Source: .Blood Blood    Culture - Blood (collected 08-02-20 @ 21:12)  Source: .Blood Blood  Gram Stain (08-03-20 @ 11:05):    Growth in anaerobic bottle: Gram Variable Rods  Organism: Blood Culture PCR (08-03-20 @ 12:52)  Organism: Blood Culture PCR (08-03-20 @ 12:52)    Sensitivities:      -  Acinetobacter baumanii: Nondet      -  Candida albicans: Nondet      -  Candida glabrata: Nondet      -  Candida krusei: Nondet      -  Candida parapsilosis: Nondet      -  Candida tropicalis: Nondet      -  Coagulase negative Staphylococcus: Nondet      -  Enterobacter cloacae complex: Nondet      -  Enterococcus species: Nondet      -  Escherichia coli: Nondet      -  Haemophilus influenzae: Nondet      -  Klebsiella oxytoca: Nondet      -  Klebsiella pneumoniae: Nondet      -  Listeria monocytogenes: Nondet      -  Methicillin resistant Staphylococcus aureus (MRSA): Nondet      -  Multidrug (KPC pos) resistant organism: Nondet      -  Neisseria meningitidis: Nondet      -  Pseudomonas aeruginosa: Nondet      -  Serratia marcescens: Nondet      -  Staphylococcus aureus: Nondet      -  Streptococcus agalactiae (Group B): Nondet      -  Streptococcus pneumoniae: Nondet      -  Streptococcus pyogenes (Group A): Nondet      -  Streptococcus sp. (Not Grp A, B or S pneumoniae): Nondet      -  Vancomycin resistant Enterococcus sp.: Nondet      Method Type: PCR    Culture - Urine (collected 08-02-20 @ 20:45)  Source: .Urine Clean Catch (Midstream)  Final Report (08-03-20 @ 15:48):    <10,000 CFU/mL Normal Urogenital Mary    WBC Count: 12.05 K/uL (08-04-20 @ 08:40)  WBC Count: 11.79 K/uL (08-03-20 @ 10:02)  WBC Count: 18.20 K/uL (08-02-20 @ 11:57)    Creatinine, Serum: 0.85 mg/dL (08-04-20 @ 08:40)  Creatinine, Serum: 0.89 mg/dL (08-03-20 @ 10:02)  Creatinine, Serum: 1.18 mg/dL (08-02-20 @ 11:57)    COVID-19 PCR: NotDetec (08-02-20 @ 16:48)    All imaging and other data have been reviewed.  Abdominal CT 8/2:   LOWER CHEST: Within normal limits.  LIVER: Steatosis.  BILE DUCTS: Normal caliber.  GALLBLADDER: Within normal limits.  SPLEEN: Within normal limits.  PANCREAS: Within normal limits.  ADRENALS: Within normal limits.  KIDNEYS/URETERS: Within normal limits.  BLADDER: Within normal limits.  REPRODUCTIVE ORGANS: Prostate within normal limits.  BOWEL/LYMPH NODES: Circumferential mural thickening of the ascending colon with extension into the terminal ileum. Surrounding inflammatory changes. Enlarged mesenteric lymph nodes in the right lower quadrant, measuring up to 2.0 x 1.5 cm. Reflux of contrast into the distal esophagus. No bowel obstruction. Appendix is not visualized.  PERITONEUM: No ascites.  VESSELS: Within normal limits.  ABDOMINAL WALL: Within normal limits.  BONES: Degenerative changes.  IMPRESSION:  Circumferential mural thickening of the ascending colon with adjacent enlarged mesenteric lymph nodes. Primary consideration is neoplasm. Colitis is considered less likely. (02 Aug 2020 17:22)    Assessment and Plan:   46 y/o man with PMH of HTN and hepatitis B was admitted on 8/2 with abdominal pain mostly in R mid and lower part of abdomen started the same day. He doesn't have any nausea, vomiting, diarrhea or constipation. Tolerating clear liquid very well. Abdominal CT showed "Circumferential mural thickening of the ascending colon with extension into the terminal ileum" so he was started on cipro+flagyl for colitis. Labs done [facility] (MRN # [id number]).  For HBV takes entecavir for 9 years and goes for surveillance [facility] annually except for this year due to COVID.  Never had colonoscopy.   It could be colitis but needs to rule out underlying malignancy as well.   UA normal.  WBC 18k with left shift    Colitis/bacteremia:  - Blood culture with gram variable [place], now identified as clostridium septicum   - As per [facility] lab, it was sent to [facility] and will takes very long time to get the results    - Repeat blood culture pending   - Continue Zosyn 3.375gm q8h   - Going for colonoscopy today   - Trend WBC, 18k-->12k  - Based on his response and repeat blood culture will decide about switching to oral.     HBV:  - Continue Entecavir   - LFTs are normal, TB mild elevation  - HBV VL PCR pending    Will follow.    Irma Grant MD  Division of Infectious Diseases   [phone] [facility]  INFECTIOUS DISEASES   =======================================================    [facility]-[id number]  MARY KIM     Follow up: Bacteremia and colitis     No fever, pain is better, yesterday had more pain after drinking Golytely to prepare for colonoscopy today.   Tody pain is better. No nausea or vomiting.     PAST MEDICAL & SURGICAL HISTORY:  HTN (hypertension)  No significant past surgical history    Social Hx: No smoking, ETOH or drugs     FAMILY HISTORY: no HBV as per him.     Allergies  ibuprofen (Other)    Antibiotics:  Metronidazole   entecavir 0.5 milliGRAM(s) Oral daily    REVIEW OF SYSTEMS:  CONSTITUTIONAL:  No Fever or chills  HEENT:  No diplopia or blurred vision.  No sore throat or runny nose.  CARDIOVASCULAR:  No chest pain or SOB.  RESPIRATORY:  No cough, shortness of breath, PND or orthopnea.  GASTROINTESTINAL:  No nausea, vomiting or diarrhea.  GENITOURINARY:  No dysuria, frequency or urgency. No Blood in urine  MUSCULOSKELETAL:  no joint aches, no muscle pain  SKIN:  No change in skin, hair or nails.  NEUROLOGIC:  No paresthesias, fasciculations, seizures or weakness.  PSYCHIATRIC:  No disorder of thought or mood.  ENDOCRINE:  No heat or cold intolerance, polyuria or polydipsia.  HEMATOLOGICAL:  No easy bruising or bleeding.     Physical Exam:  Vital Signs Last 24 Hrs  T(C): 36.9 (04 Aug 2020 04:56), Max: 37.3 (03 Aug 2020 22:23)  T(F): 98.5 (04 Aug 2020 04:56), Max: 99.2 (03 Aug 2020 22:23)  HR: 84 (04 Aug 2020 04:56) (84 - 88)  BP: 103/69 (04 Aug 2020 04:56) (103/69 - 124/72)  RR: 19 (04 Aug 2020 04:56) (16 - 19)  SpO2: 96% (04 Aug 2020 04:56) (96% - 98%)  GEN: NAD, obese  HEENT: normocephalic and atraumatic. EOMI. PERRL.    NECK: Supple.  No lymphadenopathy   LUNGS: Clear to auscultation.  HEART: Regular rate and rhythm without murmur.  ABDOMEN: Soft, mild tenderness in RLQ, no rebound tenderness or guarding, and nondistended.  Positive bowel sounds.    : No CVA tenderness  EXTREMITIES: Without any cyanosis, clubbing, rash, lesions or edema.  NEUROLOGIC: grossly intact.  PSYCHIATRIC: Appropriate affect .  SKIN: No ulceration or induration present.      Labs:                        11.4   12.05 )-----------( 261      ( 04 Aug 2020 08:40 )             34.7      08-04    144  |  114<H>  |  10  ----------------------------<  105<H>  3.8   |  23  |  0.85    Ca    8.2<L>      04 Aug 2020 08:40  Phos  2.2     08-04  Mg     2.7     08-04    TPro  8.2  /  Alb  4.4  /  TBili  1.4<H>  /  DBili  x   /  AST  19  /  ALT  31  /  AlkPhos  75  08-02    GI PCR Panel, Stool (collected 08-04-20 @ 02:37)  Source: .Stool Feces  Final Report (08-04-20 @ 06:21):    GI PCR Results: NOT detected    Culture - Blood (collected 08-02-20 @ 21:12)  Source: .Blood Blood    Culture - Blood (collected 08-02-20 @ 21:12)  Source: .Blood Blood  Gram Stain (08-03-20 @ 11:05):    Growth in anaerobic bottle: Gram Variable Rods  Organism: Blood Culture PCR (08-03-20 @ 12:52)  Organism: Blood Culture PCR (08-03-20 @ 12:52)    Sensitivities:      -  Acinetobacter baumanii: Nondet      -  Candida albicans: Nondet      -  Candida glabrata: Nondet      -  Candida krusei: Nondet      -  Candida parapsilosis: Nondet      -  Candida tropicalis: Nondet      -  Coagulase negative Staphylococcus: Nondet      -  Enterobacter cloacae complex: Nondet      -  Enterococcus species: Nondet      -  Escherichia coli: Nondet      -  Haemophilus influenzae: Nondet      -  Klebsiella oxytoca: Nondet      -  Klebsiella pneumoniae: Nondet      -  Listeria monocytogenes: Nondet      -  Methicillin resistant Staphylococcus aureus (MRSA): Nondet      -  Multidrug (KPC pos) resistant organism: Nondet      -  Neisseria meningitidis: Nondet      -  Pseudomonas aeruginosa: Nondet      -  Serratia marcescens: Nondet      -  Staphylococcus aureus: Nondet      -  Streptococcus agalactiae (Group B): Nondet      -  Streptococcus pneumoniae: Nondet      -  Streptococcus pyogenes (Group A): Nondet      -  Streptococcus sp. (Not Grp A, B or S pneumoniae): Nondet      -  Vancomycin resistant Enterococcus sp.: Nondet      Method Type: PCR    Culture - Urine (collected 08-02-20 @ 20:45)  Source: .Urine Clean Catch (Midstream)  Final Report (08-03-20 @ 15:48):    <10,000 CFU/mL Normal Urogenital Leon    WBC Count: 12.05 K/uL (08-04-20 @ 08:40)  WBC Count: 11.79 K/uL (08-03-20 @ 10:02)  WBC Count: 18.20 K/uL (08-02-20 @ 11:57)    Creatinine, Serum: 0.85 mg/dL (08-04-20 @ 08:40)  Creatinine, Serum: 0.89 mg/dL (08-03-20 @ 10:02)  Creatinine, Serum: 1.18 mg/dL (08-02-20 @ 11:57)    COVID-19 PCR: NotDetec (08-02-20 @ 16:48)    All imaging and other data have been reviewed.  Abdominal CT 8/2:   LOWER CHEST: Within normal limits.  LIVER: Steatosis.  BILE DUCTS: Normal caliber.  GALLBLADDER: Within normal limits.  SPLEEN: Within normal limits.  PANCREAS: Within normal limits.  ADRENALS: Within normal limits.  KIDNEYS/URETERS: Within normal limits.  BLADDER: Within normal limits.  REPRODUCTIVE ORGANS: Prostate within normal limits.  BOWEL/LYMPH NODES: Circumferential mural thickening of the ascending colon with extension into the terminal ileum. Surrounding inflammatory changes. Enlarged mesenteric lymph nodes in the right lower quadrant, measuring up to 2.0 x 1.5 cm. Reflux of contrast into the distal esophagus. No bowel obstruction. Appendix is not visualized.  PERITONEUM: No ascites.  VESSELS: Within normal limits.  ABDOMINAL WALL: Within normal limits.  BONES: Degenerative changes.  IMPRESSION:  Circumferential mural thickening of the ascending colon with adjacent enlarged mesenteric lymph nodes. Primary consideration is neoplasm. Colitis is considered less likely. (02 Aug 2020 17:22)    Assessment and Plan:   44 y/o man with PMH of HTN and hepatitis B was admitted on 8/2 with abdominal pain mostly in R mid and lower part of abdomen started the same day. He doesn't have any nausea, vomiting, diarrhea or constipation. Tolerating clear liquid very well. Abdominal CT showed "Circumferential mural thickening of the ascending colon with extension into the terminal ileum" so he was started on cipro+flagyl for colitis. Labs done [facility] (MRN # [id number]).  For HBV takes entecavir for 9 years and goes for surveillance [facility] annually except for this year due to COVID.  Never had colonoscopy.   It could be colitis but needs to rule out underlying malignancy as well.   UA normal.  WBC 18k with left shift    Colitis/bacteremia:  - Blood culture with gram variable [place], now identified as clostridium septicum   - Had colonoscopy today, showing an obstructive ulcerated mass sin ascending colon   - Surgery consult has been called.   - Repeat blood culture pending   - Continue Zosyn 3.375gm q8h (good coverage for GI leon and bacteremia, low resistance rate)  - Trend WBC, 18k-->12k    HBV:  - Continue Entecavir   - LFTs are normal, TB mild elevation  - HBV VL PCR pending    Will follow.    Irma Grant MD  Division of Infectious Diseases   [phone]

## 2020-08-05 DIAGNOSIS — K63.89 OTHER SPECIFIED DISEASES OF INTESTINE: ICD-10-CM

## 2020-08-05 DIAGNOSIS — R78.81 BACTEREMIA: ICD-10-CM

## 2020-08-05 LAB
ANION GAP SERPL CALC-SCNC: 6 MMOL/L — SIGNIFICANT CHANGE UP (ref 5–17)
BUN SERPL-MCNC: 10 MG/DL — SIGNIFICANT CHANGE UP (ref 7–23)
CALCIUM SERPL-MCNC: 8.3 MG/DL — LOW (ref 8.5–10.1)
CHLORIDE SERPL-SCNC: 112 MMOL/L — HIGH (ref 96–108)
CO2 SERPL-SCNC: 25 MMOL/L — SIGNIFICANT CHANGE UP (ref 22–31)
CREAT SERPL-MCNC: 0.77 MG/DL — SIGNIFICANT CHANGE UP (ref 0.5–1.3)
GLUCOSE SERPL-MCNC: 108 MG/DL — HIGH (ref 70–99)
HCT VFR BLD CALC: 33.7 % — LOW (ref 39–50)
HGB BLD-MCNC: 11 G/DL — LOW (ref 13–17)
MAGNESIUM SERPL-MCNC: 2.4 MG/DL — SIGNIFICANT CHANGE UP (ref 1.6–2.6)
MCHC RBC-ENTMCNC: 27.8 PG — SIGNIFICANT CHANGE UP (ref 27–34)
MCHC RBC-ENTMCNC: 32.6 GM/DL — SIGNIFICANT CHANGE UP (ref 32–36)
MCV RBC AUTO: 85.3 FL — SIGNIFICANT CHANGE UP (ref 80–100)
NRBC # BLD: 0 /100 WBCS — SIGNIFICANT CHANGE UP (ref 0–0)
PHOSPHATE SERPL-MCNC: 2.7 MG/DL — SIGNIFICANT CHANGE UP (ref 2.5–4.5)
PLATELET # BLD AUTO: 276 K/UL — SIGNIFICANT CHANGE UP (ref 150–400)
POTASSIUM SERPL-MCNC: 3.5 MMOL/L — SIGNIFICANT CHANGE UP (ref 3.5–5.3)
POTASSIUM SERPL-SCNC: 3.5 MMOL/L — SIGNIFICANT CHANGE UP (ref 3.5–5.3)
RBC # BLD: 3.95 M/UL — LOW (ref 4.2–5.8)
RBC # FLD: 13 % — SIGNIFICANT CHANGE UP (ref 10.3–14.5)
SODIUM SERPL-SCNC: 143 MMOL/L — SIGNIFICANT CHANGE UP (ref 135–145)
WBC # BLD: 8.24 K/UL — SIGNIFICANT CHANGE UP (ref 3.8–10.5)
WBC # FLD AUTO: 8.24 K/UL — SIGNIFICANT CHANGE UP (ref 3.8–10.5)

## 2020-08-05 PROCEDURE — 99233 SBSQ HOSP IP/OBS HIGH 50: CPT

## 2020-08-05 PROCEDURE — 99232 SBSQ HOSP IP/OBS MODERATE 35: CPT

## 2020-08-05 RX ORDER — SODIUM CHLORIDE 9 MG/ML
1000 INJECTION, SOLUTION INTRAVENOUS
Refills: 0 | Status: COMPLETED | OUTPATIENT
Start: 2020-08-05 | End: 2020-08-05

## 2020-08-05 RX ADMIN — PIPERACILLIN AND TAZOBACTAM 25 GRAM(S): 4; .5 INJECTION, POWDER, LYOPHILIZED, FOR SOLUTION INTRAVENOUS at 06:44

## 2020-08-05 RX ADMIN — PIPERACILLIN AND TAZOBACTAM 25 GRAM(S): 4; .5 INJECTION, POWDER, LYOPHILIZED, FOR SOLUTION INTRAVENOUS at 13:29

## 2020-08-05 RX ADMIN — PIPERACILLIN AND TAZOBACTAM 25 GRAM(S): 4; .5 INJECTION, POWDER, LYOPHILIZED, FOR SOLUTION INTRAVENOUS at 22:36

## 2020-08-05 RX ADMIN — ATORVASTATIN CALCIUM 40 MILLIGRAM(S): 80 TABLET, FILM COATED ORAL at 22:36

## 2020-08-05 RX ADMIN — SODIUM CHLORIDE 100 MILLILITER(S): 9 INJECTION, SOLUTION INTRAVENOUS at 11:56

## 2020-08-05 RX ADMIN — AMLODIPINE BESYLATE 5 MILLIGRAM(S): 2.5 TABLET ORAL at 06:45

## 2020-08-05 RX ADMIN — VALSARTAN 160 MILLIGRAM(S): 80 TABLET ORAL at 06:45

## 2020-08-05 RX ADMIN — ENTECAVIR 0.5 MILLIGRAM(S): 0.5 TABLET ORAL at 11:56

## 2020-08-05 NOTE — PROGRESS NOTE ADULT - PROBLEM SELECTOR PLAN 1
path reviewed and discussed with patient  I suspect there is cancer deeper to what was able to be biopsied  I favor inpatient right hemicolectomy to relieve impending obstruction and stage his presumed malignancy  he understands and wishes to think it over and discuss with surgery  cont clears  defer oncology eval until diagnosis established as he is overwhelmed (rightfully so) with the situation

## 2020-08-05 NOTE — PROGRESS NOTE ADULT - PROBLEM SELECTOR PLAN 1
ascending colonic mass with associated colitis s/p biopsy- finding concerning for malignancy; CEA only mildly elevated- awaiting for pathology  given obstructing nature of mass, patient will likely need colonic resection- surgery following  on clears and gentle IVF

## 2020-08-05 NOTE — PROGRESS NOTE ADULT - ASSESSMENT
44 yo M with a PMHx of Hepatitis B, HTN, HLD presents with RLQ pain secondary to colonic mass and associated colitis- also with CLostridium septicum bacteremia

## 2020-08-05 NOTE — PROGRESS NOTE ADULT - PROBLEM SELECTOR PLAN 2
clostridium septicum bacteremia with negative repeat blood cultures thus far  c/w Zosyn; resolved leukocytosis; remains afebrile

## 2020-08-05 NOTE — DIETITIAN INITIAL EVALUATION ADULT. - OTHER INFO
45 year old male s/p colonoscopy with obstructing ascending colon mass. PMH HLD, HTN hep B IV LR 100ml hr patient reports tolerating clear liquids. ORI diet followed at home PTA. good PO intake PTA no recent weight change per patient. patient sitting in chair appears well nourished. discussed ensure clear supplement with patient. patient receiving as per protocol on clear liquids tray BID. BM 8/5 liquid per flow sheet.  no food allergies. status post colonoscopy with obstructing ascending colon mass. per MD noted awaiting biopsy results.

## 2020-08-05 NOTE — PROGRESS NOTE ADULT - SUBJECTIVE AND OBJECTIVE BOX
[facility]  INFECTIOUS DISEASES   =======================================================    [facility]-[id number]  MARY KIM     Follow up: Bacteremia and colitis     No fever, pain is better, Choloscopy showed a mass in ascending colon.   Tody pain is better. No nausea or vomiting.     PAST MEDICAL & SURGICAL HISTORY:  HTN (hypertension)  No significant past surgical history    Social Hx: No smoking, ETOH or drugs     FAMILY HISTORY: no HBV as per him.     Allergies  ibuprofen (Other)    Antibiotics:  Metronidazole   entecavir 0.5 milliGRAM(s) Oral daily    REVIEW OF SYSTEMS:  CONSTITUTIONAL:  No Fever or chills  HEENT:  No diplopia or blurred vision.  No sore throat or runny nose.  CARDIOVASCULAR:  No chest pain or SOB.  RESPIRATORY:  No cough, shortness of breath, PND or orthopnea.  GASTROINTESTINAL:  No nausea, vomiting or diarrhea.  GENITOURINARY:  No dysuria, frequency or urgency. No Blood in urine  MUSCULOSKELETAL:  no joint aches, no muscle pain  SKIN:  No change in skin, hair or nails.  NEUROLOGIC:  No paresthesias, fasciculations, seizures or weakness.  PSYCHIATRIC:  No disorder of thought or mood.  ENDOCRINE:  No heat or cold intolerance, polyuria or polydipsia.  HEMATOLOGICAL:  No easy bruising or bleeding.     Physical Exam:  Vital Signs Last 24 Hrs  T(C): 37.4 (05 Aug 2020 05:00), Max: 37.4 (05 Aug 2020 05:00)  T(F): 99.3 (05 Aug 2020 05:00), Max: 99.3 (05 Aug 2020 05:00)  HR: 73 (05 Aug 2020 05:00) (72 - 80)  BP: 119/79 (05 Aug 2020 05:00) (116/78 - 123/85)  BP(mean): --  RR: 18 (05 Aug 2020 05:00) (11 - 18)  SpO2: 97% (05 Aug 2020 05:00) (95% - 97%)  GEN: NAD, obese  HEENT: normocephalic and atraumatic. EOMI. PERRL.    NECK: Supple.  No lymphadenopathy   LUNGS: Clear to auscultation.  HEART: Regular rate and rhythm without murmur.  ABDOMEN: Soft, mild tenderness in RLQ, no rebound tenderness or guarding, and nondistended.  Positive bowel sounds.    : No CVA tenderness  EXTREMITIES: Without any cyanosis, clubbing, rash, lesions or edema.  NEUROLOGIC: grossly intact.  PSYCHIATRIC: Appropriate affect .  SKIN: No ulceration or induration present.    Labs:                    11.0   8.24  )-----------( 276      ( 05 Aug 2020 08:42 )             33.7      08-05    143  |  112<H>  |  10  ----------------------------<  108<H>  3.5   |  25  |  0.77    Ca    8.3<L>      05 Aug 2020 08:42  Phos  2.7     08-05  Mg     2.4     08-05    GI PCR Panel, Stool (collected 08-04-20 @ 02:37)  Source: .Stool Feces  Final Report (08-04-20 @ 06:21):    GI PCR Results: NOT detected    *******Please Note:*******    GI panel PCR evaluates for:    Campylobacter, Plesiomonas shigelloides, Salmonella,    Vibrio, Yersinia enterocolitica, Enteroaggregative    Escherichia coli (EAEC), Enteropathogenic E.coli (EPEC),    Enterotoxigenic E. coli (ETEC) lt/st, Shiga-like    toxin-producing E. coli (STEC) stx1/stx2,    Shigella/ Enteroinvasive E. coli (EIEC), Cryptosporidium,    Cyclospora cayetanensis, Entamoeba histolytica,    Giardia lamblia, Adenovirus F 40/41, Astrovirus,    Norovirus GI/GII, Rotavirus A, Sapovirus    Culture - Blood (collected 08-03-20 @ 18:56)  Source: .Blood Blood-Venous    Culture - Blood (collected 08-03-20 @ 18:56)  Source: .Blood Blood    Culture - Blood (collected 08-02-20 @ 21:12)  Source: .Blood Blood    Culture - Blood (collected 08-02-20 @ 21:12)  Source: .Blood Blood  Gram Stain (08-03-20 @ 11:05):    Growth in anaerobic bottle: Gram Variable Rods  Final Report (08-04-20 @ 11:21):    Growth in anaerobic bottle: Clostridium septicum "Susceptibilities not    performed"    "Due to technical problems, Proteus sp. will Not be reported as part of    the BCID panel until further notice"    ***Blood Panel PCR results on this specimen are available    approximately 3 hours after the Gram stain result.***    Gram stain, PCR, and/or culture results may not always    correspond due to difference in methodologies.    ************************************************************    This PCR assay was performed using [employer].    The following targets are tested for: Enterococcus,    vancomycin resistant enterococci, Listeria monocytogenes,    coagulase negative staphylococci, S. aureus,    methicillin resistant S. aureus, Streptococcus agalactiae    (Group B), S. pneumoniae, S. pyogenes (Group A),    Acinetobacter baumannii, Enterobacter cloacae, E. coli,    Klebsiella oxytoca, K. pneumoniae, Proteus sp.,    Serratia marcescens, Haemophilus influenzae,    Neisseria meningitidis, Pseudomonas aeruginosa, Candida    albicans, C. glabrata, C krusei, C parapsilosis,    C. tropicalis and the KPC resistance gene.  Organism: Blood Culture PCR (08-04-20 @ 11:21)  Organism: Blood Culture PCR (08-04-20 @ 11:21)    Sensitivities:      -  Acinetobacter baumanii: Nondet      -  Candida albicans: Nondet      -  Candida glabrata: Nondet      -  Candida krusei: Nondet      -  Candida parapsilosis: Nondet      -  Candida tropicalis: Nondet      -  Coagulase negative Staphylococcus: Nondet      -  Enterobacter cloacae complex: Nondet      -  Enterococcus species: Nondet      -  Escherichia coli: Nondet      -  Haemophilus influenzae: Nondet      -  Klebsiella oxytoca: Nondet      -  Klebsiella pneumoniae: Nondet      -  Listeria monocytogenes: Nondet      -  Methicillin resistant Staphylococcus aureus (MRSA): Nondet      -  Multidrug (KPC pos) resistant organism: Nondet      -  Neisseria meningitidis: Nondet      -  Pseudomonas aeruginosa: Nondet      -  Serratia marcescens: Nondet      -  Staphylococcus aureus: Nondet      -  Streptococcus agalactiae (Group B): Nondet      -  Streptococcus pneumoniae: Nondet      -  Streptococcus pyogenes (Group A): Nondet      -  Streptococcus sp. (Not Grp A, B or S pneumoniae): Nondet      -  Vancomycin resistant Enterococcus sp.: Nondet      Method Type: PCR    Culture - Urine (collected 08-02-20 @ 20:45)  Source: .Urine Clean Catch (Midstream)  Final Report (08-03-20 @ 15:48):    <10,000 CFU/mL Normal Urogenital Leon    WBC Count: 8.24 K/uL (08-05-20 @ 08:42)  WBC Count: 12.05 K/uL (08-04-20 @ 08:40)  WBC Count: 11.79 K/uL (08-03-20 @ 10:02)  WBC Count: 18.20 K/uL (08-02-20 @ 11:57)    Creatinine, Serum: 0.77 mg/dL (08-05-20 @ 08:42)  Creatinine, Serum: 0.85 mg/dL (08-04-20 @ 08:40)  Creatinine, Serum: 0.89 mg/dL (08-03-20 @ 10:02)  Creatinine, Serum: 1.18 mg/dL (08-02-20 @ 11:57)    COVID-19 PCR: NotDetec (08-02-20 @ 16:48)    All imaging and other data have been reviewed.  Abdominal CT 8/2:   LOWER CHEST: Within normal limits.  LIVER: Steatosis.  BILE DUCTS: Normal caliber.  GALLBLADDER: Within normal limits.  SPLEEN: Within normal limits.  PANCREAS: Within normal limits.  ADRENALS: Within normal limits.  KIDNEYS/URETERS: Within normal limits.  BLADDER: Within normal limits.  REPRODUCTIVE ORGANS: Prostate within normal limits.  BOWEL/LYMPH NODES: Circumferential mural thickening of the ascending colon with extension into the terminal ileum. Surrounding inflammatory changes. Enlarged mesenteric lymph nodes in the right lower quadrant, measuring up to 2.0 x 1.5 cm. Reflux of contrast into the distal esophagus. No bowel obstruction. Appendix is not visualized.  PERITONEUM: No ascites.  VESSELS: Within normal limits.  ABDOMINAL WALL: Within normal limits.  BONES: Degenerative changes.  IMPRESSION:  Circumferential mural thickening of the ascending colon with adjacent enlarged mesenteric lymph nodes. Primary consideration is neoplasm. Colitis is considered less likely. (02 Aug 2020 17:22)    Assessment and Plan:   44 y/o man with PMH of HTN and hepatitis B was admitted on 8/2 with abdominal pain mostly in R mid and lower part of abdomen started the same day. He doesn't have any nausea, vomiting, diarrhea or constipation. Tolerating clear liquid very well. Abdominal CT showed "Circumferential mural thickening of the ascending colon with extension into the terminal ileum" so he was started on cipro+flagyl for colitis. Labs done Reymundo (MRN # [id number]).  For HBV takes entecavir for 9 years and goes for surveillance [facility] annually except for this year due to COVID.  Never had colonoscopy.   Clostridium septicum bacteremia is associated with colon cancer.   UA normal.  WBC 18k with left shift    Clostridium septicum bacteremia, and colon mass:  - Blood culture with clostridium septicum on 8/2  - Repeat blood culture 8/3 NGTD  - Had colonoscopy yesterday, showing an obstructive ulcerated mass in ascending colon, will follow pathology.    - Surgery seen him, plan for surgery soon   - Continue Zosyn 3.375gm q8h (good coverage for GI leon and bacteremia, low resistance rate)  - Trend WBC, 18k-->8k    HBV:  - Continue Entecavir   - LFTs are normal, TB mild elevation  - HBV VL PCR pending    Will follow.    Irma Grant MD  Division of Infectious Diseases   [phone]

## 2020-08-05 NOTE — PROGRESS NOTE ADULT - ASSESSMENT
All as above in PA notation.  Patient personally seen and examined.  Vitals non-suggestive.  Abdominal examination continues to improve.  Labs reassuring overall.  Pathology results reviewed and discussed in detail.  Patient appears clinically improved and surgically stable at present.  However:  -Given large nature of this nearly obstructing mass  -Given its symptomatic state  -Given the elevated CEA  -Given the growth of clostridium septicum in blood culture  I have told Mr. Mahmood and his wife that this lesion needs to be resected with a right hemicolectomy,  while the endoscopic biopsy is negative for temo malignancy, it should still be considered as malignant for present.  Risks, benefits, nature of operative intervention reviewed in detail, including, but not limited to:  -General anesthesia, intubation, Grossman insertion, laparoscopy, planned laparoscopic right hemicolectomy, possible open right hemicolectomy  They demonstrate good understanding, ask insightful questions and will consider.  In interim, continue present supportive care...

## 2020-08-05 NOTE — PROGRESS NOTE ADULT - SUBJECTIVE AND OBJECTIVE BOX
Contact Number: [phone]    Patient is a 45y old  Male who presents with a chief complaint of Possible Colitis (04 Aug 2020 14:16)      SUBJECTIVE / OVERNIGHT EVENTS: r-sided abdominal pain is better this morning. +BM and +flatus. Denies fevers, chills, CP, SOB, N/V.    MEDICATIONS  (STANDING):  amLODIPine   Tablet 5 milliGRAM(s) Oral daily  atorvastatin 40 milliGRAM(s) Oral at bedtime  entecavir 0.5 milliGRAM(s) Oral daily  heparin   Injectable 5000 Unit(s) SubCutaneous every 8 hours  lactated ringers 1000 milliLiter(s) (100 mL/Hr) IV Continuous <Continuous>  piperacillin/tazobactam IVPB.. 3.375 Gram(s) IV Intermittent every 8 hours  valsartan 160 milliGRAM(s) Oral daily    MEDICATIONS  (PRN):  acetaminophen   Tablet .. 650 milliGRAM(s) Oral every 6 hours PRN Temp greater or equal to 38C (100.4F), Mild Pain (1 - 3)  oxyCODONE    IR 5 milliGRAM(s) Oral every 6 hours PRN Moderate Pain (4 - 6)      Vital Signs Last 24 Hrs  T(C): 37.4 (05 Aug 2020 05:00), Max: 37.4 (05 Aug 2020 05:00)  T(F): 99.3 (05 Aug 2020 05:00), Max: 99.3 (05 Aug 2020 05:00)  HR: 73 (05 Aug 2020 05:00) (72 - 80)  BP: 119/79 (05 Aug 2020 05:00) (116/78 - 136/83)  BP(mean): --  RR: 18 (05 Aug 2020 05:00) (11 - 24)  SpO2: 97% (05 Aug 2020 05:00) (95% - 97%)  CAPILLARY BLOOD GLUCOSE        PHYSICAL EXAM:  GENERAL: NAD, well-developed  EYES: EOMI, PERRLA, conjunctiva and sclera clear  NECK: Supple, No JVD  CHEST/LUNG: Clear to auscultation bilaterally; No wheeze  HEART: Regular rate and rhythm  ABDOMEN: Soft, mild tenderness r-side w/o rebound or guarding; Nondistended; Bowel sounds present  EXTREMITIES: No clubbing, cyanosis, or edema  PSYCH: AAOx3  NEUROLOGY: no gross sensory deficits to light touch  Muscle strength 5/5 b/l UE (handgrip and elbow flexion/ext) and LE (DF/PF/HF)  Speech: fluent      LABS:                        11.0   8.24  )-----------( 276      ( 05 Aug 2020 08:42 )             33.7     08-05    143  |  112<H>  |  10  ----------------------------<  108<H>  3.5   |  25  |  0.77    Ca    8.3<L>      05 Aug 2020 08:42  Phos  2.7     08-05  Mg     2.4     08-05                    GI PCR Panel, Stool (collected 04 Aug 2020 02:37)  Source: .Stool Feces  Final Report (04 Aug 2020 06:21):    GI PCR Results: NOT detected    *******Please Note:*******    GI panel PCR evaluates for:    Campylobacter, Plesiomonas shigelloides, Salmonella,    Vibrio, Yersinia enterocolitica, Enteroaggregative    Escherichia coli (EAEC), Enteropathogenic E.coli (EPEC),    Enterotoxigenic E. coli (ETEC) lt/st, Shiga-like    toxin-producing E. coli (STEC) stx1/stx2,    Shigella/ Enteroinvasive E. coli (EIEC), Cryptosporidium,    Cyclospora cayetanensis, Entamoeba histolytica,    Giardia lamblia, Adenovirus F 40/41, Astrovirus,    Norovirus GI/GII, Rotavirus A, Sapovirus    Culture - Blood (collected 03 Aug 2020 18:56)  Source: .Blood Blood-Venous  Preliminary Report (04 Aug 2020 19:01):    No growth to date.    Culture - Blood (collected 03 Aug 2020 18:56)  Source: .Blood Blood  Preliminary Report (04 Aug 2020 19:01):    No growth to date.    Culture - Blood (collected 02 Aug 2020 21:12)  Source: .Blood Blood  Preliminary Report (03 Aug 2020 22:01):    No growth to date.    Culture - Blood (collected 02 Aug 2020 21:12)  Source: .Blood Blood  Gram Stain (03 Aug 2020 11:05):    Growth in anaerobic bottle: Gram Variable Rods  Final Report (04 Aug 2020 11:21):    Growth in anaerobic bottle: Clostridium septicum "Susceptibilities not    performed"    "Due to technical problems, Proteus sp. will Not be reported as part of    the BCID panel until further notice"    ***Blood Panel PCR results on this specimen are available    approximately 3 hours after the Gram stain result.***    Gram stain, PCR, and/or culture results may not always    correspond due to difference in methodologies.    ************************************************************    This PCR assay was performed using [employer].    The following targets are tested for: Enterococcus,    vancomycin resistant enterococci, Listeria monocytogenes,    coagulase negative staphylococci, S. aureus,    methicillin resistant S. aureus, Streptococcus agalactiae    (Group B), S. pneumoniae, S. pyogenes (Group A),    Acinetobacter baumannii, Enterobacter cloacae, E. coli,    Klebsiella oxytoca, K. pneumoniae, Proteus sp.,    Serratia marcescens, Haemophilus influenzae,    Neisseria meningitidis, Pseudomonas aeruginosa, Candida    albicans, C. glabrata, C krusei, C parapsilosis,    C. tropicalis and the KPC resistance gene.  Organism: Blood Culture PCR (04 Aug 2020 11:21)  Organism: Blood Culture PCR (04 Aug 2020 11:21)    Culture - Urine (collected 02 Aug 2020 20:45)  Source: .Urine Clean Catch (Midstream)  Final Report (03 Aug 2020 15:48):    <10,000 CFU/mL Normal Urogenital Mary      RADIOLOGY & ADDITIONAL TESTS:    Imaging Personally Reviewed:    Consultant(s) Notes Reviewed:      Care Discussed with Consultants/Other Providers:

## 2020-08-05 NOTE — PROGRESS NOTE ADULT - SUBJECTIVE AND OBJECTIVE BOX
SUBJECTIVE:  Patient seen and examined at bedside.  Patient offers no complaints at this time.    Vital Signs Last 24 Hrs  T(C): 37.4 (05 Aug 2020 05:00), Max: 37.4 (05 Aug 2020 05:00)  T(F): 99.3 (05 Aug 2020 05:00), Max: 99.3 (05 Aug 2020 05:00)  HR: 73 (05 Aug 2020 05:00) (72 - 80)  BP: 119/79 (05 Aug 2020 05:00) (116/78 - 136/83)  RR: 18 (05 Aug 2020 05:00) (11 - 24)  SpO2: 97% (05 Aug 2020 05:00) (95% - 97%)    PHYSICAL EXAM  GENERAL:  Well-nourished, well-developed male lying comfortably in bed in NAD  HEAD:  Normocephalic, atraumatic  ABDOMEN:  Soft, nondistended, +mild Right-sided central TTP; BS appreciated on auscultation.  No rebound tenderness or guarding.  NEURO:  A&O x 3    MEDICATIONS  (STANDING):  amLODIPine   Tablet 5 milliGRAM(s) Oral daily  atorvastatin 40 milliGRAM(s) Oral at bedtime  entecavir 0.5 milliGRAM(s) Oral daily  heparin   Injectable 5000 Unit(s) SubCutaneous every 8 hours  lactated ringers 1000 milliLiter(s) (100 mL/Hr) IV Continuous <Continuous>  piperacillin/tazobactam IVPB.. 3.375 Gram(s) IV Intermittent every 8 hours  valsartan 160 milliGRAM(s) Oral daily    MEDICATIONS  (PRN):  acetaminophen   Tablet .. 650 milliGRAM(s) Oral every 6 hours PRN Temp greater or equal to 38C (100.4F), Mild Pain (1 - 3)  oxyCODONE    IR 5 milliGRAM(s) Oral every 6 hours PRN Moderate Pain (4 - 6)    LABS:                        11.0   8.24  )-----------( 276      ( 05 Aug 2020 08:42 )             33.7     08-05    143  |  112<H>  |  10  ----------------------------<  108<H>  3.5   |  25  |  0.77    Ca    8.3<L>      05 Aug 2020 08:42  Phos  2.7     08-05  Mg     2.4     08-05    GI PCR Panel, Stool (collected 04 Aug 2020 02:37)  Source: .Stool Feces  Final Report (04 Aug 2020 06:21):    GI PCR Results: NOT detected    *******Please Note:*******    GI panel PCR evaluates for:    Campylobacter, Plesiomonas shigelloides, Salmonella,    Vibrio, Yersinia enterocolitica, Enteroaggregative    Escherichia coli (EAEC), Enteropathogenic E.coli (EPEC),    Enterotoxigenic E. coli (ETEC) lt/st, Shiga-like    toxin-producing E. coli (STEC) stx1/stx2,    Shigella/ Enteroinvasive E. coli (EIEC), Cryptosporidium,    Cyclospora cayetanensis, Entamoeba histolytica,    Giardia lamblia, Adenovirus F 40/41, Astrovirus,    Norovirus GI/GII, Rotavirus A, Sapovirus    Culture - Blood (collected 03 Aug 2020 18:56)  Source: .Blood Blood-Venous  Preliminary Report (04 Aug 2020 19:01):    No growth to date.    Culture - Blood (collected 03 Aug 2020 18:56)  Source: .Blood Blood  Preliminary Report (04 Aug 2020 19:01):    No growth to date.    Culture - Blood (collected 02 Aug 2020 21:12)  Source: .Blood Blood  Preliminary Report (03 Aug 2020 22:01):    No growth to date.    Culture - Blood (collected 02 Aug 2020 21:12)  Source: .Blood Blood  Gram Stain (03 Aug 2020 11:05):    Growth in anaerobic bottle: Gram Variable Rods  Final Report (04 Aug 2020 11:21):    Growth in anaerobic bottle: Clostridium septicum "Susceptibilities not    performed"    "Due to technical problems, Proteus sp. will Not be reported as part of    the BCID panel until further notice"    ***Blood Panel PCR results on this specimen are available    approximately 3 hours after the Gram stain result.***    Gram stain, PCR, and/or culture results may not always    correspond due to difference in methodologies.    ************************************************************    This PCR assay was performed using [employer].    The following targets are tested for: Enterococcus,    vancomycin resistant enterococci, Listeria monocytogenes,    coagulase negative staphylococci, S. aureus,    methicillin resistant S. aureus, Streptococcus agalactiae    (Group B), S. pneumoniae, S. pyogenes (Group A),    Acinetobacter baumannii, Enterobacter cloacae, E. coli,    Klebsiella oxytoca, K. pneumoniae, Proteus sp.,    Serratia marcescens, Haemophilus influenzae,    Neisseria meningitidis, Pseudomonas aeruginosa, Candida    albicans, C. glabrata, C krusei, C parapsilosis,    C. tropicalis and the KPC resistance gene.  Organism: Blood Culture PCR (04 Aug 2020 11:21)  Organism: Blood Culture PCR (04 Aug 2020 11:21)    Culture - Urine (collected 02 Aug 2020 20:45)  Source: .Urine Clean Catch (Midstream)  Final Report (03 Aug 2020 15:48):    <10,000 CFU/mL Normal Urogenital Mary    ASSESSMENT & PLAN  45 year old male s/p colonoscopy with obstructing ascending colon mass.    - Follow up pathology  - DVT prophylaxis with SQH  - OOB, pain control prn  - Case discussed with Dr. Schafer

## 2020-08-05 NOTE — PROGRESS NOTE ADULT - SUBJECTIVE AND OBJECTIVE BOX
[facility]  Hima Granados PA-C  [address] [place] [address]  [phone]      INTERVAL HPI/OVERNIGHT EVENTS:    patient s/e  denies abdomen pain     MEDICATIONS  (STANDING):  amLODIPine   Tablet 5 milliGRAM(s) Oral daily  atorvastatin 40 milliGRAM(s) Oral at bedtime  entecavir 0.5 milliGRAM(s) Oral daily  heparin   Injectable 5000 Unit(s) SubCutaneous every 8 hours  piperacillin/tazobactam IVPB.. 3.375 Gram(s) IV Intermittent every 8 hours  valsartan 160 milliGRAM(s) Oral daily    MEDICATIONS  (PRN):  acetaminophen   Tablet .. 650 milliGRAM(s) Oral every 6 hours PRN Temp greater or equal to 38C (100.4F), Mild Pain (1 - 3)  oxyCODONE    IR 5 milliGRAM(s) Oral every 6 hours PRN Moderate Pain (4 - 6)      Allergies    ibuprofen (Other)  ibuprofen (Rash)    Intolerances        ROS:   General:  No wt loss, fevers, chills, night sweats, fatigue,   Eyes:  Good vision, no reported pain  ENT:  No sore throat, pain, runny nose, dysphagia  CV:  No pain, palpitations, hypo/hypertension  Resp:  No dyspnea, cough, tachypnea, wheezing  GI:  No pain, No nausea, No vomiting, No diarrhea, No constipation, No weight loss, No fever, No pruritis, No rectal bleeding, No tarry stools, No dysphagia,  :  No pain, bleeding, incontinence, nocturia  Muscle:  No pain, weakness  Neuro:  No weakness, tingling, memory problems  Psych:  No fatigue, insomnia, mood problems, depression  Endocrine:  No polyuria, polydipsia, cold/heat intolerance  Heme:  No petechiae, ecchymosis, easy bruisability  Skin:  No rash, tattoos, scars, edema      PHYSICAL EXAM:   Vital Signs:  Vital Signs Last 24 Hrs  T(C): 37 (05 Aug 2020 12:54), Max: 37.4 (05 Aug 2020 05:00)  T(F): 98.6 (05 Aug 2020 12:54), Max: 99.3 (05 Aug 2020 05:00)  HR: 85 (05 Aug 2020 12:54) (73 - 85)  BP: 128/84 (05 Aug 2020 12:54) (116/78 - 128/84)  BP(mean): --  RR: 17 (05 Aug 2020 12:54) (16 - 18)  SpO2: 98% (05 Aug 2020 12:54) (97% - 98%)  Daily     Daily Weight in k.6 (05 Aug 2020 11:44)    GENERAL:  Appears stated age, well-groomed, well-nourished, no distress  HEENT:  NC/AT,  conjunctivae clear and pink, no thyromegaly, nodules, adenopathy, no JVD, sclera -anicteric  CHEST:  Full & symmetric excursion, no increased effort, breath sounds clear  HEART:  Regular rhythm, S1, S2, no murmur/rub/S3/S4, no abdominal bruit, no edema  ABDOMEN:  Soft, non-tender, non-distended, normoactive bowel sounds,  no masses ,no hepato-splenomegaly, no signs of chronic liver disease  EXTEREMITIES:  no cyanosis,clubbing or edema  SKIN:  No rash/erythema/ecchymoses/petechiae/wounds/abscess/warm/dry  NEURO:  Alert, oriented, no asterixis, no tremor, no encephalopathy      LABS:                        11.0   8.24  )-----------( 276      ( 05 Aug 2020 08:42 )             33.7     08-05    143  |  112<H>  |  10  ----------------------------<  108<H>  3.5   |  25  |  0.77    Ca    8.3<L>      05 Aug 2020 08:42  Phos  2.7     08-05  Mg     2.4     08-05            RADIOLOGY & ADDITIONAL TESTS:

## 2020-08-06 ENCOUNTER — TRANSCRIPTION ENCOUNTER (OUTPATIENT)
Age: 45
End: 2020-08-06

## 2020-08-06 DIAGNOSIS — Z01.818 ENCOUNTER FOR OTHER PREPROCEDURAL EXAMINATION: ICD-10-CM

## 2020-08-06 LAB
ANION GAP SERPL CALC-SCNC: 6 MMOL/L — SIGNIFICANT CHANGE UP (ref 5–17)
BUN SERPL-MCNC: 10 MG/DL — SIGNIFICANT CHANGE UP (ref 7–23)
CALCIUM SERPL-MCNC: 8.4 MG/DL — LOW (ref 8.5–10.1)
CHLORIDE SERPL-SCNC: 110 MMOL/L — HIGH (ref 96–108)
CO2 SERPL-SCNC: 28 MMOL/L — SIGNIFICANT CHANGE UP (ref 22–31)
CREAT SERPL-MCNC: 0.86 MG/DL — SIGNIFICANT CHANGE UP (ref 0.5–1.3)
GLUCOSE SERPL-MCNC: 97 MG/DL — SIGNIFICANT CHANGE UP (ref 70–99)
HCT VFR BLD CALC: 33 % — LOW (ref 39–50)
HGB BLD-MCNC: 10.8 G/DL — LOW (ref 13–17)
MCHC RBC-ENTMCNC: 27.4 PG — SIGNIFICANT CHANGE UP (ref 27–34)
MCHC RBC-ENTMCNC: 32.7 GM/DL — SIGNIFICANT CHANGE UP (ref 32–36)
MCV RBC AUTO: 83.8 FL — SIGNIFICANT CHANGE UP (ref 80–100)
NRBC # BLD: 0 /100 WBCS — SIGNIFICANT CHANGE UP (ref 0–0)
PLATELET # BLD AUTO: 287 K/UL — SIGNIFICANT CHANGE UP (ref 150–400)
POTASSIUM SERPL-MCNC: 3.5 MMOL/L — SIGNIFICANT CHANGE UP (ref 3.5–5.3)
POTASSIUM SERPL-SCNC: 3.5 MMOL/L — SIGNIFICANT CHANGE UP (ref 3.5–5.3)
RBC # BLD: 3.94 M/UL — LOW (ref 4.2–5.8)
RBC # FLD: 13.1 % — SIGNIFICANT CHANGE UP (ref 10.3–14.5)
SODIUM SERPL-SCNC: 144 MMOL/L — SIGNIFICANT CHANGE UP (ref 135–145)
WBC # BLD: 6.19 K/UL — SIGNIFICANT CHANGE UP (ref 3.8–10.5)
WBC # FLD AUTO: 6.19 K/UL — SIGNIFICANT CHANGE UP (ref 3.8–10.5)

## 2020-08-06 PROCEDURE — 99233 SBSQ HOSP IP/OBS HIGH 50: CPT

## 2020-08-06 PROCEDURE — 99233 SBSQ HOSP IP/OBS HIGH 50: CPT | Mod: 57

## 2020-08-06 PROCEDURE — 99232 SBSQ HOSP IP/OBS MODERATE 35: CPT

## 2020-08-06 RX ORDER — GABAPENTIN 400 MG/1
600 CAPSULE ORAL ONCE
Refills: 0 | Status: DISCONTINUED | OUTPATIENT
Start: 2020-08-07 | End: 2020-08-07

## 2020-08-06 RX ORDER — METRONIDAZOLE 500 MG
1000 TABLET ORAL EVERY 8 HOURS
Refills: 0 | Status: DISCONTINUED | OUTPATIENT
Start: 2020-08-06 | End: 2020-08-06

## 2020-08-06 RX ORDER — SODIUM CHLORIDE 9 MG/ML
1000 INJECTION INTRAMUSCULAR; INTRAVENOUS; SUBCUTANEOUS
Refills: 0 | Status: DISCONTINUED | OUTPATIENT
Start: 2020-08-06 | End: 2020-08-07

## 2020-08-06 RX ORDER — METRONIDAZOLE 500 MG
1000 TABLET ORAL
Refills: 0 | Status: COMPLETED | OUTPATIENT
Start: 2020-08-06 | End: 2020-08-06

## 2020-08-06 RX ORDER — ACETAMINOPHEN 500 MG
1000 TABLET ORAL ONCE
Refills: 0 | Status: DISCONTINUED | OUTPATIENT
Start: 2020-08-07 | End: 2020-08-07

## 2020-08-06 RX ORDER — SODIUM CHLORIDE 9 MG/ML
1000 INJECTION, SOLUTION INTRAVENOUS
Refills: 0 | Status: DISCONTINUED | OUTPATIENT
Start: 2020-08-06 | End: 2020-08-06

## 2020-08-06 RX ORDER — NEOMYCIN SULFATE 500 MG/1
1000 TABLET ORAL EVERY 8 HOURS
Refills: 0 | Status: DISCONTINUED | OUTPATIENT
Start: 2020-08-06 | End: 2020-08-06

## 2020-08-06 RX ORDER — SOD SULF/SODIUM/NAHCO3/KCL/PEG
1000 SOLUTION, RECONSTITUTED, ORAL ORAL ONCE
Refills: 0 | Status: COMPLETED | OUTPATIENT
Start: 2020-08-06 | End: 2020-08-06

## 2020-08-06 RX ORDER — NEOMYCIN SULFATE 500 MG/1
1000 TABLET ORAL
Refills: 0 | Status: COMPLETED | OUTPATIENT
Start: 2020-08-06 | End: 2020-08-06

## 2020-08-06 RX ORDER — ALVIMOPAN 12 MG/1
12 CAPSULE ORAL ONCE
Refills: 0 | Status: DISCONTINUED | OUTPATIENT
Start: 2020-08-07 | End: 2020-08-07

## 2020-08-06 RX ADMIN — ATORVASTATIN CALCIUM 40 MILLIGRAM(S): 80 TABLET, FILM COATED ORAL at 21:07

## 2020-08-06 RX ADMIN — SODIUM CHLORIDE 75 MILLILITER(S): 9 INJECTION, SOLUTION INTRAVENOUS at 16:52

## 2020-08-06 RX ADMIN — PIPERACILLIN AND TAZOBACTAM 25 GRAM(S): 4; .5 INJECTION, POWDER, LYOPHILIZED, FOR SOLUTION INTRAVENOUS at 05:40

## 2020-08-06 RX ADMIN — NEOMYCIN SULFATE 1000 MILLIGRAM(S): 500 TABLET ORAL at 17:52

## 2020-08-06 RX ADMIN — NEOMYCIN SULFATE 1000 MILLIGRAM(S): 500 TABLET ORAL at 16:51

## 2020-08-06 RX ADMIN — Medication 1000 MILLIGRAM(S): at 16:51

## 2020-08-06 RX ADMIN — PIPERACILLIN AND TAZOBACTAM 25 GRAM(S): 4; .5 INJECTION, POWDER, LYOPHILIZED, FOR SOLUTION INTRAVENOUS at 13:32

## 2020-08-06 RX ADMIN — NEOMYCIN SULFATE 1000 MILLIGRAM(S): 500 TABLET ORAL at 23:03

## 2020-08-06 RX ADMIN — Medication 1000 MILLILITER(S): at 16:51

## 2020-08-06 RX ADMIN — Medication 1000 MILLIGRAM(S): at 17:52

## 2020-08-06 RX ADMIN — VALSARTAN 160 MILLIGRAM(S): 80 TABLET ORAL at 05:40

## 2020-08-06 RX ADMIN — Medication 1000 MILLIGRAM(S): at 23:03

## 2020-08-06 RX ADMIN — AMLODIPINE BESYLATE 5 MILLIGRAM(S): 2.5 TABLET ORAL at 05:40

## 2020-08-06 RX ADMIN — PIPERACILLIN AND TAZOBACTAM 25 GRAM(S): 4; .5 INJECTION, POWDER, LYOPHILIZED, FOR SOLUTION INTRAVENOUS at 21:07

## 2020-08-06 RX ADMIN — ENTECAVIR 0.5 MILLIGRAM(S): 0.5 TABLET ORAL at 11:32

## 2020-08-06 NOTE — PROGRESS NOTE ADULT - SUBJECTIVE AND OBJECTIVE BOX
HOSPITAL DAY: 4    SUBJECTIVE:  Patient seen and examined at bedside.  No overnight events.  Patient with no new complaints at this time, tolerating diet, admits to flatus and bowel movement.  Patient denies any fevers, chills, chest pain, shortness of breath, abdominal pain, nausea, vomiting or diarrhea.    Vital Signs Last 24 Hrs  T(C): 36.7 (06 Aug 2020 12:59), Max: 36.9 (06 Aug 2020 05:29)  T(F): 98.1 (06 Aug 2020 12:59), Max: 98.5 (06 Aug 2020 05:29)  HR: 72 (06 Aug 2020 12:59) (71 - 74)  BP: 134/91 (06 Aug 2020 12:59) (122/78 - 134/91)  BP(mean): --  RR: 17 (06 Aug 2020 12:59) (17 - 18)  SpO2: 98% (06 Aug 2020 12:59) (98% - 100%)    PHYSICAL EXAM:  GENERAL: No acute distress, well-developed  HEAD:  Atraumatic, Normocephalic  CHEST/LUNG: CTAB; No wheezes, rales, or rhonchi  HEART: Regular rate and rhythm; No murmurs, rubs, or gallops  ABDOMEN: Soft, non-tender, non-distended; bowel sounds+  NEUROLOGY: A&O x 3, no focal deficits    I&O's Summary    05 Aug 2020 07:01  -  06 Aug 2020 07:00  --------------------------------------------------------  IN: 300 mL / OUT: 0 mL / NET: 300 mL    06 Aug 2020 07:01  -  06 Aug 2020 14:48  --------------------------------------------------------  IN: 940 mL / OUT: 0 mL / NET: 940 mL      I&O's Detail    05 Aug 2020 07:01  -  06 Aug 2020 07:00  --------------------------------------------------------  IN:    Solution: 300 mL  Total IN: 300 mL    OUT:  Total OUT: 0 mL    Total NET: 300 mL      06 Aug 2020 07:01  -  06 Aug 2020 14:48  --------------------------------------------------------  IN:    Oral Fluid: 840 mL    Solution: 100 mL  Total IN: 940 mL    OUT:  Total OUT: 0 mL    Total NET: 940 mL        MEDICATIONS  (STANDING):  amLODIPine   Tablet 5 milliGRAM(s) Oral daily  atorvastatin 40 milliGRAM(s) Oral at bedtime  entecavir 0.5 milliGRAM(s) Oral daily  heparin   Injectable 5000 Unit(s) SubCutaneous every 8 hours  piperacillin/tazobactam IVPB.. 3.375 Gram(s) IV Intermittent every 8 hours  valsartan 160 milliGRAM(s) Oral daily    MEDICATIONS  (PRN):  acetaminophen   Tablet .. 650 milliGRAM(s) Oral every 6 hours PRN Temp greater or equal to 38C (100.4F), Mild Pain (1 - 3)  oxyCODONE    IR 5 milliGRAM(s) Oral every 6 hours PRN Moderate Pain (4 - 6)    LABS:                        10.8   6.19  )-----------( 287      ( 06 Aug 2020 09:30 )             33.0     08-06    144  |  110<H>  |  10  ----------------------------<  97  3.5   |  28  |  0.86    Ca    8.4<L>      06 Aug 2020 09:30  Phos  2.7     08-05  Mg     2.4     08-05      RADIOLOGY & ADDITIONAL STUDIES:  no new    ASSESSMENT    45y Male with ascending colonic mass awaiting likely R hemicolectomy surgery tomorrow.    PLAN  - Will discuss with Dr. Schafer  - preop for OR tomorrow for R hemicolectomy   - pain control, supportive care  - OOB  - NPO, IVF   - serial abdominal exams  - labs in am    Surgical Team Contact Information  Spectralink: Ext: [phone] or [phone]  Pager: [phone] HOSPITAL DAY: 4      SUBJECTIVE:  Patient seen and examined at bedside.  No overnight events.  Patient with no new complaints at this time, tolerating diet, admits to flatus and bowel movement.  Patient denies any fevers, chills, chest pain, shortness of breath, abdominal pain, nausea, vomiting or diarrhea.      Vital Signs Last 24 Hrs  T(F): 98.1 (06 Aug 2020 12:59), Max: 98.5 (06 Aug 2020 05:29)  HR: 72 (06 Aug 2020 12:59) (71 - 74)  BP: 134/91 (06 Aug 2020 12:59) (122/78 - 134/91)  RR: 17 (06 Aug 2020 12:59) (17 - 18)  SpO2: 98% (06 Aug 2020 12:59) (98% - 100%)      PHYSICAL EXAM:  GENERAL: No acute distress, well-developed  HEAD:  Atraumatic, Normocephalic  CHEST/LUNG: CTAB; No wheezes, rales, or rhonchi  HEART: Regular rate and rhythm; No murmurs, rubs, or gallops  ABDOMEN: Soft, non-tender, non-distended; bowel sounds+  NEUROLOGY: A&O x 3, no focal deficits      I&O's Summary    05 Aug 2020 07:01  -  06 Aug 2020 07:00  --------------------------------------------------------  IN: 300 mL / OUT: 0 mL / NET: 300 mL    06 Aug 2020 07:01  -  06 Aug 2020 14:48  --------------------------------------------------------  IN: 940 mL / OUT: 0 mL / NET: 940 mL      I&O's Detail    05 Aug 2020 07:01  -  06 Aug 2020 07:00  --------------------------------------------------------  IN:    Solution: 300 mL  Total IN: 300 mL    OUT:  Total OUT: 0 mL    Total NET: 300 mL      06 Aug 2020 07:01  -  06 Aug 2020 14:48  --------------------------------------------------------  IN:    Oral Fluid: 840 mL    Solution: 100 mL  Total IN: 940 mL    OUT:  Total OUT: 0 mL    Total NET: 940 mL      MEDICATIONS  (STANDING):  amLODIPine   Tablet 5 milliGRAM(s) Oral daily  atorvastatin 40 milliGRAM(s) Oral at bedtime  entecavir 0.5 milliGRAM(s) Oral daily  heparin   Injectable 5000 Unit(s) SubCutaneous every 8 hours  piperacillin/tazobactam IVPB.. 3.375 Gram(s) IV Intermittent every 8 hours  valsartan 160 milliGRAM(s) Oral daily      MEDICATIONS  (PRN):  acetaminophen   Tablet .. 650 milliGRAM(s) Oral every 6 hours PRN Temp greater or equal to 38C (100.4F), Mild Pain (1 - 3)  oxyCODONE    IR 5 milliGRAM(s) Oral every 6 hours PRN Moderate Pain (4 - 6)      LABS:                        10.8   6.19  )-----------( 287      ( 06 Aug 2020 09:30 )             33.0     08-06    144  |  110<H>  |  10  ----------------------------<  97  3.5   |  28  |  0.86    Ca    8.4<L>      06 Aug 2020 09:30  Phos  2.7     08-05  Mg     2.4     08-05      RADIOLOGY & ADDITIONAL STUDIES:  no new      ASSESSMENT      45y Male with ascending colonic mass awaiting likely R hemicolectomy surgery tomorrow.      PLAN  - Will discuss with Dr. Schafer  - preop for OR tomorrow for R hemicolectomy   - pain control, supportive care  - OOB  - NPO, IVF   - serial abdominal exams  - labs in am      Surgical Team Contact Information  Spectralink: Ext: [phone] or [phone]  Pager: [phone]

## 2020-08-06 NOTE — PROGRESS NOTE ADULT - SUBJECTIVE AND OBJECTIVE BOX
INTERVAL HPI/OVERNIGHT EVENTS:  pt seen and examined   denies n/v, less abd abd pain; c/o bloating  +loose brown stool  cristopher clears    MEDICATIONS  (STANDING):  amLODIPine   Tablet 5 milliGRAM(s) Oral daily  atorvastatin 40 milliGRAM(s) Oral at bedtime  entecavir 0.5 milliGRAM(s) Oral daily  heparin   Injectable 5000 Unit(s) SubCutaneous every 8 hours  piperacillin/tazobactam IVPB.. 3.375 Gram(s) IV Intermittent every 8 hours  valsartan 160 milliGRAM(s) Oral daily    MEDICATIONS  (PRN):  acetaminophen   Tablet .. 650 milliGRAM(s) Oral every 6 hours PRN Temp greater or equal to 38C (100.4F), Mild Pain (1 - 3)  oxyCODONE    IR 5 milliGRAM(s) Oral every 6 hours PRN Moderate Pain (4 - 6)      Allergies    ibuprofen (Other)  ibuprofen (Rash)    Intolerances        ROS:   General:  No wt loss, fevers, chills, night sweats, fatigue,   Eyes:  Good vision, no reported pain  ENT:  No sore throat, pain, runny nose, dysphagia  CV:  No pain, palpitations, hypo/hypertension  Resp:  No dyspnea, cough, tachypnea, wheezing  GI: see above  :  No pain, bleeding, incontinence, nocturia  Muscle:  No pain, weakness  Neuro:  No weakness, tingling, memory problems  Psych:  No fatigue, insomnia, mood problems, depression  Endocrine:  No polyuria, polydipsia, cold/heat intolerance  Heme:  No petechiae, ecchymosis, easy bruisability  Skin:  No rash, tattoos, scars, edema      PHYSICAL EXAM:   Vital Signs:  Vital Signs Last 24 Hrs  T(C): 37 (05 Aug 2020 12:54), Max: 37.4 (05 Aug 2020 05:00)  T(F): 98.6 (05 Aug 2020 12:54), Max: 99.3 (05 Aug 2020 05:00)  HR: 85 (05 Aug 2020 12:54) (73 - 85)  BP: 128/84 (05 Aug 2020 12:54) (116/78 - 128/84)  BP(mean): --  RR: 17 (05 Aug 2020 12:54) (16 - 18)  SpO2: 98% (05 Aug 2020 12:54) (97% - 98%)  Daily     Daily Weight in k.6 (05 Aug 2020 11:44)    GENERAL:  nad  HEENT:  NC/AT  ABDOMEN:  soft ttp rlq mild dt  EXTEREMITIES:  no  edema  NEURO:  A/o x 3      LABS:                        11.0   8.24  )-----------( 276      ( 05 Aug 2020 08:42 )             33.7     08-05    143  |  112<H>  |  10  ----------------------------<  108<H>  3.5   |  25  |  0.77    Ca    8.3<L>      05 Aug 2020 08:42  Phos  2.7     08-05  Mg     2.4     08-05            RADIOLOGY & ADDITIONAL TESTS:

## 2020-08-06 NOTE — PROGRESS NOTE ADULT - SUBJECTIVE AND OBJECTIVE BOX
Contact Number: [phone]    Patient is a 45y old  Male who presents with a chief complaint of Possible Colitis (05 Aug 2020 18:10)      SUBJECTIVE / OVERNIGHT EVENTS: c/o mild abdominal pain. Denies CP, SOB, N/V. Passing flatus and having BM's.    MEDICATIONS  (STANDING):  amLODIPine   Tablet 5 milliGRAM(s) Oral daily  atorvastatin 40 milliGRAM(s) Oral at bedtime  entecavir 0.5 milliGRAM(s) Oral daily  heparin   Injectable 5000 Unit(s) SubCutaneous every 8 hours  piperacillin/tazobactam IVPB.. 3.375 Gram(s) IV Intermittent every 8 hours  valsartan 160 milliGRAM(s) Oral daily    MEDICATIONS  (PRN):  acetaminophen   Tablet .. 650 milliGRAM(s) Oral every 6 hours PRN Temp greater or equal to 38C (100.4F), Mild Pain (1 - 3)  oxyCODONE    IR 5 milliGRAM(s) Oral every 6 hours PRN Moderate Pain (4 - 6)      Vital Signs Last 24 Hrs  T(C): 36.9 (06 Aug 2020 05:29), Max: 37 (05 Aug 2020 12:54)  T(F): 98.5 (06 Aug 2020 05:29), Max: 98.6 (05 Aug 2020 12:54)  HR: 71 (06 Aug 2020 05:29) (71 - 85)  BP: 122/78 (06 Aug 2020 05:29) (122/78 - 128/84)  BP(mean): --  RR: 18 (06 Aug 2020 05:29) (17 - 18)  SpO2: 100% (06 Aug 2020 05:29) (98% - 100%)  CAPILLARY BLOOD GLUCOSE        I&O's Summary    05 Aug 2020 07:01  -  06 Aug 2020 07:00  --------------------------------------------------------  IN: 300 mL / OUT: 0 mL / NET: 300 mL        PHYSICAL EXAM:  GENERAL: NAD, well-developed  EYES: EOMI, PERRLA, conjunctiva and sclera clear  NECK: Supple, No JVD  CHEST/LUNG: Clear to auscultation bilaterally; No wheeze  HEART: Regular rate and rhythm;   ABDOMEN: Soft, mild r-sided tenderness w/o rebound or guarding, Nondistended; Bowel sounds present  EXTREMITIES:  No clubbing, cyanosis, or edema  PSYCH: AAOx3  NEUROLOGY: no gross sensory deficits to light touch  FROM X4  Speech: fluent      LABS:                        10.8   6.19  )-----------( 287      ( 06 Aug 2020 09:30 )             33.0     08-06    144  |  110<H>  |  10  ----------------------------<  97  3.5   |  28  |  0.86    Ca    8.4<L>      06 Aug 2020 09:30  Phos  2.7     08-05  Mg     2.4     08-05                    GI PCR Panel, Stool (collected 04 Aug 2020 02:37)  Source: .Stool Feces  Final Report (04 Aug 2020 06:21):    GI PCR Results: NOT detected    *******Please Note:*******    GI panel PCR evaluates for:    Campylobacter, Plesiomonas shigelloides, Salmonella,    Vibrio, Yersinia enterocolitica, Enteroaggregative    Escherichia coli (EAEC), Enteropathogenic E.coli (EPEC),    Enterotoxigenic E. coli (ETEC) lt/st, Shiga-like    toxin-producing E. coli (STEC) stx1/stx2,    Shigella/ Enteroinvasive E. coli (EIEC), Cryptosporidium,    Cyclospora cayetanensis, Entamoeba histolytica,    Giardia lamblia, Adenovirus F 40/41, Astrovirus,    Norovirus GI/GII, Rotavirus A, Sapovirus    Culture - Blood (collected 03 Aug 2020 18:56)  Source: .Blood Blood-Venous  Preliminary Report (04 Aug 2020 19:01):    No growth to date.    Culture - Blood (collected 03 Aug 2020 18:56)  Source: .Blood Blood  Preliminary Report (04 Aug 2020 19:01):    No growth to date.      RADIOLOGY & ADDITIONAL TESTS:    Imaging Personally Reviewed:    Consultant(s) Notes Reviewed:      Care Discussed with Consultants/Other Providers:

## 2020-08-06 NOTE — PROGRESS NOTE ADULT - PROBLEM SELECTOR PLAN 4
continue with Norvasc and Valsartan  BP stable continue with Norvasc and Valsartan  BP stable.  Will hold Valsartan tomorrow continue with Norvasc and Valsartan  BP stable.  Will hold Valsartan tomorrow in lieu of planned surgery with a plan to restart on 8/8.

## 2020-08-06 NOTE — PROGRESS NOTE ADULT - SUBJECTIVE AND OBJECTIVE BOX
[facility]  INFECTIOUS DISEASES   =======================================================    [id number]  MARY KIM     Follow up: Bacteremia and colitis     No fever, pain is better, Choloscopy showed a mass in ascending colon and pathology shows Tubulovillous adenoma    No nausea or vomiting on clear liquid diet.  Will go ahead with the surgery.     PAST MEDICAL & SURGICAL HISTORY:  HTN (hypertension)  No significant past surgical history    Social Hx: No smoking, ETOH or drugs     FAMILY HISTORY: no HBV as per him.     Allergies  ibuprofen (Other)    Antibiotics:  zosyn  entecavir 0.5 milliGRAM(s) Oral daily    REVIEW OF SYSTEMS:  CONSTITUTIONAL:  No Fever or chills  HEENT:  No diplopia or blurred vision.  No sore throat or runny nose.  CARDIOVASCULAR:  No chest pain or SOB.  RESPIRATORY:  No cough, shortness of breath, PND or orthopnea.  GASTROINTESTINAL:  No nausea, vomiting or diarrhea.  GENITOURINARY:  No dysuria, frequency or urgency. No Blood in urine  MUSCULOSKELETAL:  no joint aches, no muscle pain  SKIN:  No change in skin, hair or nails.  NEUROLOGIC:  No paresthesias, fasciculations, seizures or weakness.  PSYCHIATRIC:  No disorder of thought or mood.  ENDOCRINE:  No heat or cold intolerance, polyuria or polydipsia.  HEMATOLOGICAL:  No easy bruising or bleeding.     Physical Exam:  Vital Signs Last 24 Hrs  T(C): 36.9 (06 Aug 2020 05:29), Max: 37 (05 Aug 2020 12:54)  T(F): 98.5 (06 Aug 2020 05:29), Max: 98.6 (05 Aug 2020 12:54)  HR: 71 (06 Aug 2020 05:29) (71 - 85)  BP: 122/78 (06 Aug 2020 05:29) (122/78 - 128/84)  BP(mean): --  RR: 18 (06 Aug 2020 05:29) (17 - 18)  SpO2: 100% (06 Aug 2020 05:29) (98% - 100%)  GEN: NAD, obese  HEENT: normocephalic and atraumatic. EOMI. PERRL.    NECK: Supple.  No lymphadenopathy   LUNGS: Clear to auscultation.  HEART: Regular rate and rhythm without murmur.  ABDOMEN: Soft, mild tenderness in RLQ, no rebound tenderness or guarding, and nondistended.  Positive bowel sounds.    : No CVA tenderness  EXTREMITIES: Without any cyanosis, clubbing, rash, lesions or edema.  NEUROLOGIC: grossly intact.  PSYCHIATRIC: Appropriate affect .  SKIN: No ulceration or induration present.    Labs:                  10.8   6.19  )-----------( 287      ( 06 Aug 2020 09:30 )             33.0      08-06    144  |  110<H>  |  10  ----------------------------<  97  3.5   |  28  |  0.86    Ca    8.4<L>      06 Aug 2020 09:30  Phos  2.7     08-05  Mg     2.4     08-05    GI PCR Panel, Stool (collected 08-04-20 @ 02:37)  Source: .Stool Feces  Final Report (08-04-20 @ 06:21):    GI PCR Results: NOT detected    *******Please Note:*******    GI panel PCR evaluates for:    Campylobacter, Plesiomonas shigelloides, Salmonella,    Vibrio, Yersinia enterocolitica, Enteroaggregative    Escherichia coli (EAEC), Enteropathogenic E.coli (EPEC),    Enterotoxigenic E. coli (ETEC) lt/st, Shiga-like    toxin-producing E. coli (STEC) stx1/stx2,    Shigella/ Enteroinvasive E. coli (EIEC), Cryptosporidium,    Cyclospora cayetanensis, Entamoeba histolytica,    Giardia lamblia, Adenovirus F 40/41, Astrovirus,    Norovirus GI/GII, Rotavirus A, Sapovirus    Culture - Blood (collected 08-03-20 @ 18:56)  Source: .Blood Blood-Venous    Culture - Blood (collected 08-03-20 @ 18:56)  Source: .Blood Blood    Culture - Blood (collected 08-02-20 @ 21:12)  Source: .Blood Blood    Culture - Blood (collected 08-02-20 @ 21:12)  Source: .Blood Blood  Gram Stain (08-03-20 @ 11:05):    Growth in anaerobic bottle: Gram Variable Rods  Final Report (08-04-20 @ 11:21):    Growth in anaerobic bottle: Clostridium septicum "Susceptibilities not    performed"    "Due to technical problems, Proteus sp. will Not be reported as part of    the BCID panel until further notice"    ***Blood Panel PCR results on this specimen are available    approximately 3 hours after the Gram stain result.***    Gram stain, PCR, and/or culture results may not always    correspond due to difference in methodologies.    ************************************************************    This PCR assay was performed using [employer].    The following targets are tested for: Enterococcus,    vancomycin resistant enterococci, Listeria monocytogenes,    coagulase negative staphylococci, S. aureus,    methicillin resistant S. aureus, Streptococcus agalactiae    (Group B), S. pneumoniae, S. pyogenes (Group A),    Acinetobacter baumannii, Enterobacter cloacae, E. coli,    Klebsiella oxytoca, K. pneumoniae, Proteus sp.,    Serratia marcescens, Haemophilus influenzae,    Neisseria meningitidis, Pseudomonas aeruginosa, Candida    albicans, C. glabrata, C krusei, C parapsilosis,    C. tropicalis and the KPC resistance gene.  Organism: Blood Culture PCR (08-04-20 @ 11:21)  Organism: Blood Culture PCR (08-04-20 @ 11:21)    Sensitivities:      -  Acinetobacter baumanii: Nondet      -  Candida albicans: Nondet      -  Candida glabrata: Nondet      -  Candida krusei: Nondet      -  Candida parapsilosis: Nondet      -  Candida tropicalis: Nondet      -  Coagulase negative Staphylococcus: Nondet      -  Enterobacter cloacae complex: Nondet      -  Enterococcus species: Nondet      -  Escherichia coli: Nondet      -  Haemophilus influenzae: Nondet      -  Klebsiella oxytoca: Nondet      -  Klebsiella pneumoniae: Nondet      -  Listeria monocytogenes: Nondet      -  Methicillin resistant Staphylococcus aureus (MRSA): Nondet      -  Multidrug (KPC pos) resistant organism: Nondet      -  Neisseria meningitidis: Nondet      -  Pseudomonas aeruginosa: Nondet      -  Serratia marcescens: Nondet      -  Staphylococcus aureus: Nondet      -  Streptococcus agalactiae (Group B): Nondet      -  Streptococcus pneumoniae: Nondet      -  Streptococcus pyogenes (Group A): Nondet      -  Streptococcus sp. (Not Grp A, B or S pneumoniae): Nondet      -  Vancomycin resistant Enterococcus sp.: Nondet      Method Type: PCR    Culture - Urine (collected 08-02-20 @ 20:45)  Source: .Urine Clean Catch (Midstream)  Final Report (08-03-20 @ 15:48):    <10,000 CFU/mL Normal Urogenital Leon    WBC Count: 6.19 K/uL (08-06-20 @ 09:30)  WBC Count: 8.24 K/uL (08-05-20 @ 08:42)  WBC Count: 12.05 K/uL (08-04-20 @ 08:40)  WBC Count: 11.79 K/uL (08-03-20 @ 10:02)  WBC Count: 18.20 K/uL (08-02-20 @ 11:57)    Creatinine, Serum: 0.86 mg/dL (08-06-20 @ 09:30)  Creatinine, Serum: 0.77 mg/dL (08-05-20 @ 08:42)  Creatinine, Serum: 0.85 mg/dL (08-04-20 @ 08:40)  Creatinine, Serum: 0.89 mg/dL (08-03-20 @ 10:02)  Creatinine, Serum: 1.18 mg/dL (08-02-20 @ 11:57)    COVID-19 PCR: NotDetec (08-02-20 @ 16:48)    All imaging and other data have been reviewed.  Abdominal CT 8/2:   LOWER CHEST: Within normal limits.  LIVER: Steatosis.  BILE DUCTS: Normal caliber.  GALLBLADDER: Within normal limits.  SPLEEN: Within normal limits.  PANCREAS: Within normal limits.  ADRENALS: Within normal limits.  KIDNEYS/URETERS: Within normal limits.  BLADDER: Within normal limits.  REPRODUCTIVE ORGANS: Prostate within normal limits.  BOWEL/LYMPH NODES: Circumferential mural thickening of the ascending colon with extension into the terminal ileum. Surrounding inflammatory changes. Enlarged mesenteric lymph nodes in the right lower quadrant, measuring up to 2.0 x 1.5 cm. Reflux of contrast into the distal esophagus. No bowel obstruction. Appendix is not visualized.  PERITONEUM: No ascites.  VESSELS: Within normal limits.  ABDOMINAL WALL: Within normal limits.  BONES: Degenerative changes.  IMPRESSION:  Circumferential mural thickening of the ascending colon with adjacent enlarged mesenteric lymph nodes. Primary consideration is neoplasm. Colitis is considered less likely. (02 Aug 2020 17:22)    Assessment and Plan:   44 y/o man with PMH of HTN and hepatitis B was admitted on 8/2 with abdominal pain mostly in R mid and lower part of abdomen started the same day. He doesn't have any nausea, vomiting, diarrhea or constipation. Tolerating clear liquid very well. Abdominal CT showed "Circumferential mural thickening of the ascending colon with extension into the terminal ileum" so he was started on cipro+flagyl for colitis. Labs done [facility] (MRN # [id number]).  For HBV takes entecavir for 9 years and goes for surveillance [facility] annually except for this year due to COVID.  Never had colonoscopy.   Clostridium septicum bacteremia is associated with colon cancer.   UA normal.  WBC 18k with left shift    Clostridium septicum bacteremia, and colon mass:  - Blood culture with clostridium septicum on 8/2  - Repeat blood culture 8/3 NGTD  - Colonoscopy showed an obstructive ulcerated mass in ascending colon, now with Tubulovillous adenoma in pathology.   - Surgery seen him, plan for surgery soon   - Continue Zosyn 3.375gm q8h (good coverage for GI leon and bacteremia, will continue through surgery and post op period then switch to oral if remains stable)  - Trend WBC, 18k-->6k    HBV:  - Continue Entecavir   - LFTs are normal, TB mild elevation  - HBV VL PCR pending    Will follow.    Irma Grant MD  Division of Infectious Diseases   [phone]

## 2020-08-06 NOTE — PROGRESS NOTE ADULT - ASSESSMENT
All as above.  Patient personally seen and examined.  Vitals non-suggestive.  Abdomen with minimal tenderness to deep palpation of right side/ right lower quadrant.  Labs reassuring.    Plan is for laparoscopic, possible open, right hemicolectomy.    Patient aware of very possible malignancy.    Preliminary discussion of need for Oncology follow-up is and as needed.    Risks, benefits, nature of procedure reviewed in detail with patient and wife, including but not limited to general anesthesia, intubation, nasogastric or orogastric tube, Grossman catheter insertion, nature of laparoscopy, plan for laparoscopic resection, possible conversion to open colectomy, risks of bleeding/ infection/ leak/ stoma and longer term issues such as scars/ hernias/ adhesions and need for further treatment.    We have specifically discussed possibility of visceral, vascular or urologic injury, prolonged hospital care and the significant nature of this major operative intervention.    He demonstrates good insight, asks appropriate questions and is eager to proceed.  Modified bowel preparation ongoing.  For OR tomorrow...

## 2020-08-06 NOTE — PROGRESS NOTE ADULT - PROBLEM SELECTOR PLAN 1
ascending colonic mass with associated colitis. Biposy revealing tubulovillous adenoma however due to friability of specimen and inability for scope to be advanced further due to obstruction, there is real concern that this has progressed to malignancy.  Surgery is offering right hemicolectomy, which I think is very reasonable. The mass is obstructing and specimen can be sent to path for a more confirmatory diagnosis. Pt and wife have spoken to GI and Surgery and are agreeable to proceed with surgery.  on clear  will start IVF later on; pt will be NPO after MN for pl

## 2020-08-06 NOTE — PROGRESS NOTE ADULT - PROBLEM SELECTOR PLAN 1
path reviewed and discussed with patient  suspect there is cancer deeper to what was able to be biopsied  favor inpatient right hemicolectomy to relieve impending obstruction and stage his presumed malignancy  surgery following; awaiting pts final decision  cont clears as tolerated   defer oncology eval until diagnosis established as he is overwhelmed with current situation

## 2020-08-07 ENCOUNTER — RESULT REVIEW (OUTPATIENT)
Age: 45
End: 2020-08-07

## 2020-08-07 DIAGNOSIS — K50.10 CROHN'S DISEASE OF LARGE INTESTINE WITHOUT COMPLICATIONS: ICD-10-CM

## 2020-08-07 LAB
ANION GAP SERPL CALC-SCNC: 6 MMOL/L — SIGNIFICANT CHANGE UP (ref 5–17)
APTT BLD: 31.4 SEC — SIGNIFICANT CHANGE UP (ref 27.5–35.5)
BUN SERPL-MCNC: 9 MG/DL — SIGNIFICANT CHANGE UP (ref 7–23)
CALCIUM SERPL-MCNC: 8.3 MG/DL — LOW (ref 8.5–10.1)
CHLORIDE SERPL-SCNC: 112 MMOL/L — HIGH (ref 96–108)
CO2 SERPL-SCNC: 25 MMOL/L — SIGNIFICANT CHANGE UP (ref 22–31)
CREAT SERPL-MCNC: 0.84 MG/DL — SIGNIFICANT CHANGE UP (ref 0.5–1.3)
CULTURE RESULTS: SIGNIFICANT CHANGE UP
GLUCOSE SERPL-MCNC: 104 MG/DL — HIGH (ref 70–99)
HCT VFR BLD CALC: 34 % — LOW (ref 39–50)
HGB BLD-MCNC: 11.2 G/DL — LOW (ref 13–17)
INR BLD: 1.12 RATIO — SIGNIFICANT CHANGE UP (ref 0.88–1.16)
MAGNESIUM SERPL-MCNC: 2.7 MG/DL — HIGH (ref 1.6–2.6)
MCHC RBC-ENTMCNC: 27.9 PG — SIGNIFICANT CHANGE UP (ref 27–34)
MCHC RBC-ENTMCNC: 32.9 GM/DL — SIGNIFICANT CHANGE UP (ref 32–36)
MCV RBC AUTO: 84.6 FL — SIGNIFICANT CHANGE UP (ref 80–100)
NRBC # BLD: 0 /100 WBCS — SIGNIFICANT CHANGE UP (ref 0–0)
PHOSPHATE SERPL-MCNC: 3.4 MG/DL — SIGNIFICANT CHANGE UP (ref 2.5–4.5)
PLATELET # BLD AUTO: 319 K/UL — SIGNIFICANT CHANGE UP (ref 150–400)
POTASSIUM SERPL-MCNC: 3.6 MMOL/L — SIGNIFICANT CHANGE UP (ref 3.5–5.3)
POTASSIUM SERPL-SCNC: 3.6 MMOL/L — SIGNIFICANT CHANGE UP (ref 3.5–5.3)
PROTHROM AB SERPL-ACNC: 13 SEC — SIGNIFICANT CHANGE UP (ref 10.6–13.6)
RBC # BLD: 4.02 M/UL — LOW (ref 4.2–5.8)
RBC # FLD: 12.7 % — SIGNIFICANT CHANGE UP (ref 10.3–14.5)
SODIUM SERPL-SCNC: 143 MMOL/L — SIGNIFICANT CHANGE UP (ref 135–145)
SPECIMEN SOURCE: SIGNIFICANT CHANGE UP
WBC # BLD: 6.46 K/UL — SIGNIFICANT CHANGE UP (ref 3.8–10.5)
WBC # FLD AUTO: 6.46 K/UL — SIGNIFICANT CHANGE UP (ref 3.8–10.5)

## 2020-08-07 PROCEDURE — 44205 LAP COLECTOMY PART W/ILEUM: CPT | Mod: 80,22

## 2020-08-07 PROCEDURE — 99232 SBSQ HOSP IP/OBS MODERATE 35: CPT

## 2020-08-07 PROCEDURE — 44205 LAP COLECTOMY PART W/ILEUM: CPT

## 2020-08-07 PROCEDURE — 44202 LAP ENTERECTOMY: CPT | Mod: 59

## 2020-08-07 PROCEDURE — 88307 TISSUE EXAM BY PATHOLOGIST: CPT | Mod: 26

## 2020-08-07 RX ORDER — SODIUM CHLORIDE 9 MG/ML
1000 INJECTION INTRAMUSCULAR; INTRAVENOUS; SUBCUTANEOUS
Refills: 0 | Status: DISCONTINUED | OUTPATIENT
Start: 2020-08-07 | End: 2020-08-09

## 2020-08-07 RX ORDER — KETOROLAC TROMETHAMINE 30 MG/ML
30 SYRINGE (ML) INJECTION EVERY 6 HOURS
Refills: 0 | Status: DISCONTINUED | OUTPATIENT
Start: 2020-08-07 | End: 2020-08-09

## 2020-08-07 RX ORDER — HEPARIN SODIUM 5000 [USP'U]/ML
5000 INJECTION INTRAVENOUS; SUBCUTANEOUS EVERY 8 HOURS
Refills: 0 | Status: DISCONTINUED | OUTPATIENT
Start: 2020-08-08 | End: 2020-08-11

## 2020-08-07 RX ORDER — OXYCODONE HYDROCHLORIDE 5 MG/1
5 TABLET ORAL ONCE
Refills: 0 | Status: DISCONTINUED | OUTPATIENT
Start: 2020-08-07 | End: 2020-08-07

## 2020-08-07 RX ORDER — ONDANSETRON 8 MG/1
4 TABLET, FILM COATED ORAL ONCE
Refills: 0 | Status: DISCONTINUED | OUTPATIENT
Start: 2020-08-07 | End: 2020-08-07

## 2020-08-07 RX ORDER — HYDROMORPHONE HYDROCHLORIDE 2 MG/ML
1 INJECTION INTRAMUSCULAR; INTRAVENOUS; SUBCUTANEOUS
Refills: 0 | Status: DISCONTINUED | OUTPATIENT
Start: 2020-08-07 | End: 2020-08-09

## 2020-08-07 RX ORDER — SENNA PLUS 8.6 MG/1
2 TABLET ORAL AT BEDTIME
Refills: 0 | Status: DISCONTINUED | OUTPATIENT
Start: 2020-08-07 | End: 2020-08-11

## 2020-08-07 RX ORDER — VALSARTAN 80 MG/1
160 TABLET ORAL DAILY
Refills: 0 | Status: DISCONTINUED | OUTPATIENT
Start: 2020-08-07 | End: 2020-08-11

## 2020-08-07 RX ORDER — HEPARIN SODIUM 5000 [USP'U]/ML
5000 INJECTION INTRAVENOUS; SUBCUTANEOUS EVERY 8 HOURS
Refills: 0 | Status: DISCONTINUED | OUTPATIENT
Start: 2020-08-07 | End: 2020-08-07

## 2020-08-07 RX ORDER — ACETAMINOPHEN 500 MG
650 TABLET ORAL EVERY 6 HOURS
Refills: 0 | Status: DISCONTINUED | OUTPATIENT
Start: 2020-08-07 | End: 2020-08-09

## 2020-08-07 RX ORDER — PIPERACILLIN AND TAZOBACTAM 4; .5 G/20ML; G/20ML
3.38 INJECTION, POWDER, LYOPHILIZED, FOR SOLUTION INTRAVENOUS EVERY 8 HOURS
Refills: 0 | Status: DISCONTINUED | OUTPATIENT
Start: 2020-08-07 | End: 2020-08-11

## 2020-08-07 RX ORDER — AMLODIPINE BESYLATE 2.5 MG/1
5 TABLET ORAL DAILY
Refills: 0 | Status: DISCONTINUED | OUTPATIENT
Start: 2020-08-07 | End: 2020-08-11

## 2020-08-07 RX ORDER — SODIUM CHLORIDE 9 MG/ML
1000 INJECTION, SOLUTION INTRAVENOUS
Refills: 0 | Status: DISCONTINUED | OUTPATIENT
Start: 2020-08-07 | End: 2020-08-07

## 2020-08-07 RX ORDER — PANTOPRAZOLE SODIUM 20 MG/1
40 TABLET, DELAYED RELEASE ORAL DAILY
Refills: 0 | Status: DISCONTINUED | OUTPATIENT
Start: 2020-08-08 | End: 2020-08-09

## 2020-08-07 RX ORDER — HYDROMORPHONE HYDROCHLORIDE 2 MG/ML
0.5 INJECTION INTRAMUSCULAR; INTRAVENOUS; SUBCUTANEOUS
Refills: 0 | Status: DISCONTINUED | OUTPATIENT
Start: 2020-08-07 | End: 2020-08-07

## 2020-08-07 RX ORDER — ENTECAVIR 0.5 MG/1
0.5 TABLET ORAL DAILY
Refills: 0 | Status: DISCONTINUED | OUTPATIENT
Start: 2020-08-07 | End: 2020-08-11

## 2020-08-07 RX ORDER — ATORVASTATIN CALCIUM 80 MG/1
40 TABLET, FILM COATED ORAL AT BEDTIME
Refills: 0 | Status: DISCONTINUED | OUTPATIENT
Start: 2020-08-07 | End: 2020-08-11

## 2020-08-07 RX ADMIN — PIPERACILLIN AND TAZOBACTAM 25 GRAM(S): 4; .5 INJECTION, POWDER, LYOPHILIZED, FOR SOLUTION INTRAVENOUS at 05:15

## 2020-08-07 RX ADMIN — SODIUM CHLORIDE 75 MILLILITER(S): 9 INJECTION, SOLUTION INTRAVENOUS at 16:45

## 2020-08-07 RX ADMIN — AMLODIPINE BESYLATE 5 MILLIGRAM(S): 2.5 TABLET ORAL at 05:15

## 2020-08-07 RX ADMIN — PIPERACILLIN AND TAZOBACTAM 25 GRAM(S): 4; .5 INJECTION, POWDER, LYOPHILIZED, FOR SOLUTION INTRAVENOUS at 21:25

## 2020-08-07 RX ADMIN — ATORVASTATIN CALCIUM 40 MILLIGRAM(S): 80 TABLET, FILM COATED ORAL at 21:24

## 2020-08-07 NOTE — PROGRESS NOTE ADULT - PROBLEM SELECTOR PLAN 1
- Biopsy revealed tubulovillous adenoma   - S/P R hemicolectomy today due to concern for underlying malignancy  - post op monitoring - Biopsy revealed tubulovillous adenoma   - S/P laparoscopic R hemicolectomy today due to concern for underlying malignancy in view of colonic mass and clostridium septicum bacteremia.  - post op monitoring

## 2020-08-07 NOTE — PROGRESS NOTE ADULT - SUBJECTIVE AND OBJECTIVE BOX
MEDICAL ATTENDING NOTE    Patient is a 45y old  Male who presents with a chief complaint of Possible Colitis (07 Aug 2020 13:17)      INTERVAL HPI/OVERNIGHT EVENTS: offers no new complaints today    MEDICATIONS  (STANDING):  lactated ringers. 1000 milliLiter(s) (75 mL/Hr) IV Continuous <Continuous>    MEDICATIONS  (PRN):  HYDROmorphone  Injectable 0.5 milliGRAM(s) IV Push every 10 minutes PRN Severe Pain (7 - 10)  ondansetron Injectable 4 milliGRAM(s) IV Push once PRN Nausea and/or Vomiting  oxyCODONE    IR 5 milliGRAM(s) Oral once PRN Moderate Pain (4 - 6)      __________________________________________________  ----------------------------------------------------------------------------------  REVIEW OF SYSTEMS: negative      Vital Signs Last 24 Hrs  T(C): 36.7 (07 Aug 2020 09:33), Max: 37.2 (07 Aug 2020 04:59)  T(F): 98.1 (07 Aug 2020 09:33), Max: 99 (07 Aug 2020 04:59)  HR: 69 (07 Aug 2020 09:33) (66 - 81)  BP: 122/83 (07 Aug 2020 09:33) (103/64 - 130/84)  RR: 18 (07 Aug 2020 09:33) (17 - 18)  SpO2: 98% (07 Aug 2020 09:33) (97% - 99%)    _________________  PHYSICAL EXAM:  ---------------------------   NAD; Normocephalic;   LUNGS - no wheezing  HEART: S1 S2+   ABDOMEN: Soft, , surgical wound +  EXTREMITIES: no cyanosis; no edema  NERVOUS SYSTEM:  Awake and alert;     _________________________________________________  LABS:                        11.2   6.46  )-----------( 319      ( 07 Aug 2020 07:10 )             34.0     08-07    143  |  112<H>  |  9   ----------------------------<  104<H>  3.6   |  25  |  0.84    Ca    8.3<L>      07 Aug 2020 07:10  Phos  3.4     08-07  Mg     2.7     08-07      PT/INR - ( 07 Aug 2020 07:10 )   PT: 13.0 sec;   INR: 1.12 ratio         PTT - ( 07 Aug 2020 07:10 )  PTT:31.4 sec    CAPILLARY BLOOD GLUCOSE                    Plan of care was discussed with patient ; all questions and concerns were addressed and care was aligned with patient's wishes. MEDICAL ATTENDING NOTE    Patient is a 45y old  Male who presents with a chief complaint of Possible Colitis (07 Aug 2020 13:17)      INTERVAL HPI/OVERNIGHT EVENTS: offers no new complaints today    MEDICATIONS  (STANDING):  lactated ringers. 1000 milliLiter(s) (75 mL/Hr) IV Continuous <Continuous>    MEDICATIONS  (PRN):  HYDROmorphone  Injectable 0.5 milliGRAM(s) IV Push every 10 minutes PRN Severe Pain (7 - 10)  ondansetron Injectable 4 milliGRAM(s) IV Push once PRN Nausea and/or Vomiting  oxyCODONE    IR 5 milliGRAM(s) Oral once PRN Moderate Pain (4 - 6)    ----------------------------------------------------------------------------------  REVIEW OF SYSTEMS: negative      Vital Signs Last 24 Hrs  T(C): 36.7 (07 Aug 2020 09:33), Max: 37.2 (07 Aug 2020 04:59)  T(F): 98.1 (07 Aug 2020 09:33), Max: 99 (07 Aug 2020 04:59)  HR: 69 (07 Aug 2020 09:33) (66 - 81)  BP: 122/83 (07 Aug 2020 09:33) (103/64 - 130/84)  RR: 18 (07 Aug 2020 09:33) (17 - 18)  SpO2: 98% (07 Aug 2020 09:33) (97% - 99%)    _________________  PHYSICAL EXAM:  ---------------------------   NAD; Normocephalic;   LUNGS - no wheezing, bilateral air entry  HEART: S1 S2+   ABDOMEN: Soft, , laparoscopic surgical wounds + with dressing  EXTREMITIES: no cyanosis; no edema  NERVOUS SYSTEM:  Awake and alert; non focal exam    _________________________________________________  LABS:                        11.2   6.46  )-----------( 319      ( 07 Aug 2020 07:10 )             34.0     08-07    143  |  112<H>  |  9   ----------------------------<  104<H>  3.6   |  25  |  0.84    Ca    8.3<L>      07 Aug 2020 07:10  Phos  3.4     08-07  Mg     2.7     08-07      PT/INR - ( 07 Aug 2020 07:10 )   PT: 13.0 sec;   INR: 1.12 ratio         PTT - ( 07 Aug 2020 07:10 )  PTT:31.4 sec    CAPILLARY BLOOD GLUCOSE                    Plan of care was discussed with patient ; all questions and concerns were addressed and care was aligned with patient's wishes.

## 2020-08-07 NOTE — PROGRESS NOTE ADULT - SUBJECTIVE AND OBJECTIVE BOX
INTERVAL HPI/OVERNIGHT EVENTS:  pt seen and examined   for OR today  c/o mild abd pain, +bms sp prep, no n/v    MEDICATIONS  (STANDING):  amLODIPine   Tablet 5 milliGRAM(s) Oral daily  atorvastatin 40 milliGRAM(s) Oral at bedtime  entecavir 0.5 milliGRAM(s) Oral daily  heparin   Injectable 5000 Unit(s) SubCutaneous every 8 hours  piperacillin/tazobactam IVPB.. 3.375 Gram(s) IV Intermittent every 8 hours  valsartan 160 milliGRAM(s) Oral daily    MEDICATIONS  (PRN):  acetaminophen   Tablet .. 650 milliGRAM(s) Oral every 6 hours PRN Temp greater or equal to 38C (100.4F), Mild Pain (1 - 3)  oxyCODONE    IR 5 milliGRAM(s) Oral every 6 hours PRN Moderate Pain (4 - 6)      Allergies    ibuprofen (Other)  ibuprofen (Rash)    Intolerances        ROS:   General:  No wt loss, fevers, chills, night sweats, fatigue,   Eyes:  Good vision, no reported pain  ENT:  No sore throat, pain, runny nose, dysphagia  CV:  No pain, palpitations, hypo/hypertension  Resp:  No dyspnea, cough, tachypnea, wheezing  GI: see above  :  No pain, bleeding, incontinence, nocturia  Muscle:  No pain, weakness  Neuro:  No weakness, tingling, memory problems  Psych:  No fatigue, insomnia, mood problems, depression  Endocrine:  No polyuria, polydipsia, cold/heat intolerance  Heme:  No petechiae, ecchymosis, easy bruisability  Skin:  No rash, tattoos, scars, edema      PHYSICAL EXAM:   Vital Signs:  Vital Signs Last 24 Hrs  T(C): 37 (05 Aug 2020 12:54), Max: 37.4 (05 Aug 2020 05:00)  T(F): 98.6 (05 Aug 2020 12:54), Max: 99.3 (05 Aug 2020 05:00)  HR: 85 (05 Aug 2020 12:54) (73 - 85)  BP: 128/84 (05 Aug 2020 12:54) (116/78 - 128/84)  BP(mean): --  RR: 17 (05 Aug 2020 12:54) (16 - 18)  SpO2: 98% (05 Aug 2020 12:54) (97% - 98%)  Daily     Daily Weight in k.6 (05 Aug 2020 11:44)    GENERAL:  nad  HEENT:  NC/AT  ABDOMEN:  soft mild ttp rlq mild dt  EXTEREMITIES:  no  edema  NEURO:  A/o x 3      LABS:                        11.0   8.24  )-----------( 276      ( 05 Aug 2020 08:42 )             33.7     08-05    143  |  112<H>  |  10  ----------------------------<  108<H>  3.5   |  25  |  0.77    Ca    8.3<L>      05 Aug 2020 08:42  Phos  2.7     08-05  Mg     2.4     08-05            RADIOLOGY & ADDITIONAL TESTS:

## 2020-08-07 NOTE — PROGRESS NOTE ADULT - SUBJECTIVE AND OBJECTIVE BOX
[facility]  INFECTIOUS DISEASES   =======================================================    [facility]-[id number]  MARY KIM     Follow up: Bacteremia and colitis     No fever, pain is better, Choloscopy showed a mass in ascending colon and pathology shows Tubulovillous adenoma    No nausea or vomiting on clear liquid diet.  Going for surgery today.     PAST MEDICAL & SURGICAL HISTORY:  HTN (hypertension)  No significant past surgical history    Social Hx: No smoking, ETOH or drugs     FAMILY HISTORY: no HBV as per him.     Allergies  ibuprofen (Other)    Antibiotics:  zosyn  entecavir 0.5 milliGRAM(s) Oral daily    REVIEW OF SYSTEMS:  CONSTITUTIONAL:  No Fever or chills  HEENT:  No diplopia or blurred vision.  No sore throat or runny nose.  CARDIOVASCULAR:  No chest pain or SOB.  RESPIRATORY:  No cough, shortness of breath, PND or orthopnea.  GASTROINTESTINAL:  No nausea, vomiting or diarrhea.  GENITOURINARY:  No dysuria, frequency or urgency. No Blood in urine  MUSCULOSKELETAL:  no joint aches, no muscle pain  SKIN:  No change in skin, hair or nails.  NEUROLOGIC:  No paresthesias, fasciculations, seizures or weakness.  PSYCHIATRIC:  No disorder of thought or mood.  ENDOCRINE:  No heat or cold intolerance, polyuria or polydipsia.  HEMATOLOGICAL:  No easy bruising or bleeding.     Physical Exam:  Vital Signs Last 24 Hrs  T(C): 36.7 (07 Aug 2020 09:33), Max: 37.2 (07 Aug 2020 04:59)  T(F): 98.1 (07 Aug 2020 09:33), Max: 99 (07 Aug 2020 04:59)  HR: 69 (07 Aug 2020 09:33) (66 - 81)  BP: 122/83 (07 Aug 2020 09:33) (103/64 - 130/84)  RR: 18 (07 Aug 2020 09:33) (17 - 18)  SpO2: 98% (07 Aug 2020 09:33) (97% - 99%)  GEN: NAD, obese  HEENT: normocephalic and atraumatic. EOMI. PERRL.    NECK: Supple.  No lymphadenopathy   LUNGS: Clear to auscultation.  HEART: Regular rate and rhythm without murmur.  ABDOMEN: Soft, mild tenderness in RLQ, no rebound tenderness or guarding, and nondistended.  Positive bowel sounds.    : No CVA tenderness  EXTREMITIES: Without any cyanosis, clubbing, rash, lesions or edema.  NEUROLOGIC: grossly intact.  PSYCHIATRIC: Appropriate affect .  SKIN: No ulceration or induration present.    Labs:       08-07    143  |  112<H>  |  9   ----------------------------<  104<H>  3.6   |  25  |  0.84    Ca    8.3<L>      07 Aug 2020 07:10  Phos  3.4     08-07  Mg     2.7     08-07                        11.2   6.46  )-----------( 319      ( 07 Aug 2020 07:10 )             34.0     PT/INR - ( 07 Aug 2020 07:10 )   PT: 13.0 sec;   INR: 1.12 ratio    PTT - ( 07 Aug 2020 07:10 )  PTT:31.4 sec    RECENT CULTURES:  08-04 @ 02:37 .Stool Feces     GI PCR Results: NOT detected    08-03 @ 18:56 .Blood Blood     No growth to date.    08-02 @ 21:12 .Blood Blood Blood Culture PCR    Growth in anaerobic bottle: Clostridium septicum "Susceptibilities not  Growth in anaerobic bottle: Gram Variable Rods    08-02 @ 20:45 .Urine Clean Catch (Midstream)     <10,000 CFU/mL Normal Urogenital Leon      All imaging and other data have been reviewed.  Abdominal CT 8/2:   LOWER CHEST: Within normal limits.  LIVER: Steatosis.  BILE DUCTS: Normal caliber.  GALLBLADDER: Within normal limits.  SPLEEN: Within normal limits.  PANCREAS: Within normal limits.  ADRENALS: Within normal limits.  KIDNEYS/URETERS: Within normal limits.  BLADDER: Within normal limits.  REPRODUCTIVE ORGANS: Prostate within normal limits.  BOWEL/LYMPH NODES: Circumferential mural thickening of the ascending colon with extension into the terminal ileum. Surrounding inflammatory changes. Enlarged mesenteric lymph nodes in the right lower quadrant, measuring up to 2.0 x 1.5 cm. Reflux of contrast into the distal esophagus. No bowel obstruction. Appendix is not visualized.  PERITONEUM: No ascites.  VESSELS: Within normal limits.  ABDOMINAL WALL: Within normal limits.  BONES: Degenerative changes.  IMPRESSION:  Circumferential mural thickening of the ascending colon with adjacent enlarged mesenteric lymph nodes. Primary consideration is neoplasm. Colitis is considered less likely. (02 Aug 2020 17:22)    Assessment and Plan:   46 y/o man with PMH of HTN and hepatitis B was admitted on 8/2 with abdominal pain mostly in R mid and lower part of abdomen started the same day. He doesn't have any nausea, vomiting, diarrhea or constipation. Tolerating clear liquid very well. Abdominal CT showed "Circumferential mural thickening of the ascending colon with extension into the terminal ileum" so he was started on cipro+flagyl for colitis. Labs done [place] (MRN # [id number]).  For HBV takes entecavir for 9 years and goes for surveillance [facility] annually except for this year due to COVID.  Never had colonoscopy.   Clostridium septicum bacteremia is associated with colon cancer.   UA normal.  WBC 18k with left shift    Clostridium septicum bacteremia, and colon mass:  - Blood culture with clostridium septicum on 8/2  - Repeat blood culture 8/3 NGTD  - Colonoscopy showed an obstructive ulcerated mass in ascending colon, now with Tubulovillous adenoma in pathology.   - Surgery today for partial colectomy.   - Continue Zosyn 3.375gm q8h (good coverage for GI leon and bacteremia, will continue through surgery and post op period for now)  - Trend WBC, 18k-->6k    HBV:  - Continue Entecavir   - LFTs are normal, TB mild elevation  - HBV VL PCR pending    Will follow.    Irma Grant MD  Division of Infectious Diseases   [phone]

## 2020-08-07 NOTE — BRIEF OPERATIVE NOTE - NSICDXBRIEFPROCEDURE_GEN_ALL_CORE_FT
PROCEDURES:  Hand-assisted laparoscopic excision of small bowel 07-Aug-2020 17:29:17 Laparoscopy, Laparoscopic Radical Resection of Right Colon Mass with Separate Small Bowel Resection and Anastomosis with Creation of Ileocolostomy. Harpreet Schafer  Laparoscopic right hemicolectomy 07-Aug-2020 17:28:41  Harpreet Schafer  Diagnostic laparoscopy 07-Aug-2020 17:28:30  Harpreet Schafer

## 2020-08-07 NOTE — PROGRESS NOTE ADULT - SUBJECTIVE AND OBJECTIVE BOX
Post Operative Note  Patient: MARY KIM 45y (1975) Male   MRN: [id number]  Location: [address]  Visit: 08-02-20 Inpatient  Date: 08-07-20 @ 22:24    Procedure: s/p lap right hemicolectomy,     Subjective:   44 y/o M seen and examined at bedside. Pt offering no complaints at this time in NAD. Pt tolerating CLD, denies any N/V. Pt with marks in place making adequate urine. Pt denies flatus, dizziness, chest pain, sob, nausea, vomiting, abdominal pain, or urinary complaints.    Objective:  Vitals: T(F): 97.4 (08-07-20 @ 19:04), Max: 99.1 (08-07-20 @ 15:24)  HR: 86 (08-07-20 @ 19:04)  BP: 135/87 (08-07-20 @ 19:04) (103/61 - 135/87)  RR: 17 (08-07-20 @ 19:04)  SpO2: 96% (08-07-20 @ 19:04)      In:   08-06-20 @ 07:01  -  08-07-20 @ 07:00  --------------------------------------------------------  IN: 1240 mL    08-07-20 @ 07:01  -  08-07-20 @ 22:24  --------------------------------------------------------  IN: 150 mL      IV Fluids: sodium chloride 0.9%. 1000 milliLiter(s) (75 mL/Hr) IV Continuous <Continuous>      Out:   08-06-20 @ 07:01  -  08-07-20 @ 07:00  --------------------------------------------------------  OUT: 0 mL    08-07-20 @ 07:01  -  08-07-20 @ 22:24  --------------------------------------------------------  OUT: 2000 mL      Voided Urine:   08-06-20 @ 07:01  -  08-07-20 @ 07:00  --------------------------------------------------------  OUT: 0 mL    08-07-20 @ 07:01  -  08-07-20 @ 22:24  --------------------------------------------------------  OUT: 2000 mL    Marks Catheter: yes     PHYSICAL EXAM  GENERAL:  Well-nourished, well-developed Male lying comfortably in bed in NAD  HEAD:  Normocephalic, atraumatic  CARDIO:  RRR, no MRG  RESPIRATORY:  CTABL, no ronchi, rales or wheezing  ABDOMEN:  Soft, nondistended, minimal incisional ttp, dec bs, surgical dressings c/d/i  EXTREMITIES: No calf tenderness  NEURO:  A&O x 3      Medications: [Standing]  acetaminophen   Tablet .. 650 milliGRAM(s) Oral every 6 hours PRN  amLODIPine   Tablet 5 milliGRAM(s) Oral daily  atorvastatin 40 milliGRAM(s) Oral at bedtime  entecavir 0.5 milliGRAM(s) Oral daily  HYDROmorphone  Injectable 1 milliGRAM(s) IV Push every 3 hours PRN  ketorolac   Injectable 30 milliGRAM(s) IV Push every 6 hours PRN  piperacillin/tazobactam IVPB.. 3.375 Gram(s) IV Intermittent every 8 hours  senna 2 Tablet(s) Oral at bedtime  sodium chloride 0.9%. 1000 milliLiter(s) IV Continuous <Continuous>  valsartan 160 milliGRAM(s) Oral daily    Medications: [PRN]  acetaminophen   Tablet .. 650 milliGRAM(s) Oral every 6 hours PRN  amLODIPine   Tablet 5 milliGRAM(s) Oral daily  atorvastatin 40 milliGRAM(s) Oral at bedtime  entecavir 0.5 milliGRAM(s) Oral daily  HYDROmorphone  Injectable 1 milliGRAM(s) IV Push every 3 hours PRN  ketorolac   Injectable 30 milliGRAM(s) IV Push every 6 hours PRN  piperacillin/tazobactam IVPB.. 3.375 Gram(s) IV Intermittent every 8 hours  senna 2 Tablet(s) Oral at bedtime  sodium chloride 0.9%. 1000 milliLiter(s) IV Continuous <Continuous>  valsartan 160 milliGRAM(s) Oral daily    Labs:                        11.2   6.46  )-----------( 319      ( 07 Aug 2020 07:10 )             34.0     08-07    143  |  112<H>  |  9   ----------------------------<  104<H>  3.6   |  25  |  0.84    Ca    8.3<L>      07 Aug 2020 07:10  Phos  3.4     08-07  Mg     2.7     08-07      PT/INR - ( 07 Aug 2020 07:10 )   PT: 13.0 sec;   INR: 1.12 ratio         PTT - ( 07 Aug 2020 07:10 )  PTT:31.4 sec      Assessment:  44 y/o M s/p lap right hemicolectomy, with marks in place, tolerating CLD,     Plan:  - cont cld until further return of gi fx   - cont marks d/c POD # 1  - cont post-op iv abx  - Pain control, supportive care  - Zofran PRN for nausea   - Incentive spirometry  - OOB, ambulation   - SCDs  - SQH for dvt prophylaxis starting POD # 1  - Follow up AM labs  - Will continue to monitor

## 2020-08-07 NOTE — PROGRESS NOTE ADULT - PROBLEM SELECTOR PLAN 1
path reviewed and discussed with patient  suspect there is cancer deeper to what was able to be biopsied  favor inpatient right hemicolectomy to relieve impending obstruction and stage his presumed malignancy  surgery following, planned for OR today  npo/ivf  prn pain control  to follow

## 2020-08-07 NOTE — PROGRESS NOTE ADULT - PROBLEM SELECTOR PLAN 3
clostridium septicum bacteremia   Repeat blood cultures now negative repeat ; continue Zosyn  ID follow up ongoing

## 2020-08-07 NOTE — PROGRESS NOTE ADULT - SUBJECTIVE AND OBJECTIVE BOX
Hospital day: 5    45y Male admitted with Crohn's disease of large intestine without complication  .    Patient seen and examined bedside resting comfortably.  Is currently NPO.  Is scheduled for surgery today with Dr. Schafer for a right hemicolectomy- laparoscopic possibly open.   Pt is aware.   Tolerated bowel prep yesterday.  Offers no complaints at this time.       T(F): 98.1 (08-07-20 @ 09:33), Max: 99 (08-07-20 @ 04:59)  HR: 69 (08-07-20 @ 09:33) (66 - 81)  BP: 122/83 (08-07-20 @ 09:33) (103/64 - 134/91)  RR: 18 (08-07-20 @ 09:33) (17 - 18)  SpO2: 98% (08-07-20 @ 09:33) (97% - 99%)  Wt(kg): --  CAPILLARY BLOOD GLUCOSE          PHYSICAL EXAM:  General: NAD  Neuro:  Alert & oriented x 3  CV: +S1+S2 regular rate and rhythm  Lung: clear to ausculation bilaterally  Abdomen: soft,BS+   Extremities: no pedal edema or calf tenderness noted       LABS:                        11.2   6.46  )-----------( 319      ( 07 Aug 2020 07:10 )             34.0     08-07    143  |  112<H>  |  9   ----------------------------<  104<H>  3.6   |  25  |  0.84    Ca    8.3<L>      07 Aug 2020 07:10  Phos  3.4     08-07  Mg     2.7     08-07      PT/INR - ( 07 Aug 2020 07:10 )   PT: 13.0 sec;   INR: 1.12 ratio         PTT - ( 07 Aug 2020 07:10 )  PTT:31.4 sec  I&O's Detail    06 Aug 2020 07:01  -  07 Aug 2020 07:00  --------------------------------------------------------  IN:    lactated ringers.: 300 mL    Oral Fluid: 840 mL    Solution: 100 mL  Total IN: 1240 mL    OUT:  Total OUT: 0 mL    Total NET: 1240 mL          Impression: 45y Male admitted with Crohn's disease of large intestine without complication  Scheduled for OR today for Laparoscopic, possibly open, Right hemicolectomy with Dr. Schafer.       [facility] HTN (hypertension)  HTN (hypertension)  Hepatitis B      Plan:  -continue VTE prophylaxis- SQH  - To or today. Will see post operatively.  -Continue Zosyn.  Discussed with Dr. Schafer.

## 2020-08-07 NOTE — PROGRESS NOTE ADULT - ASSESSMENT
44 yo M with a PMHx of Hepatitis B, HTN, HLD presents with RLQ pain secondary to colonic mass and associated colitis- also with CLostridium septicum bacteremia            1. Colonic mass- tubulovillous adenoma  2. Clostridium septicum bacteremia  3. Personal history of Hepatitic B+ on Entecavir  4. Transmural colitis  5. Essential HTN  6. Leukocytosis- resolved

## 2020-08-07 NOTE — BRIEF OPERATIVE NOTE - NSICDXBRIEFPOSTOP_GEN_ALL_CORE_FT
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

## 2020-08-07 NOTE — BRIEF OPERATIVE NOTE - NSICDXBRIEFPREOP_GEN_ALL_CORE_FT
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

## 2020-08-08 LAB
ALBUMIN SERPL ELPH-MCNC: 3 G/DL — LOW (ref 3.3–5)
ALP SERPL-CCNC: 64 U/L — SIGNIFICANT CHANGE UP (ref 40–120)
ALT FLD-CCNC: 26 U/L — SIGNIFICANT CHANGE UP (ref 12–78)
ANION GAP SERPL CALC-SCNC: 7 MMOL/L — SIGNIFICANT CHANGE UP (ref 5–17)
AST SERPL-CCNC: 19 U/L — SIGNIFICANT CHANGE UP (ref 15–37)
BILIRUB SERPL-MCNC: 0.7 MG/DL — SIGNIFICANT CHANGE UP (ref 0.2–1.2)
BUN SERPL-MCNC: 6 MG/DL — LOW (ref 7–23)
CALCIUM SERPL-MCNC: 8.4 MG/DL — LOW (ref 8.5–10.1)
CHLORIDE SERPL-SCNC: 110 MMOL/L — HIGH (ref 96–108)
CO2 SERPL-SCNC: 25 MMOL/L — SIGNIFICANT CHANGE UP (ref 22–31)
CREAT SERPL-MCNC: 0.77 MG/DL — SIGNIFICANT CHANGE UP (ref 0.5–1.3)
CULTURE RESULTS: SIGNIFICANT CHANGE UP
CULTURE RESULTS: SIGNIFICANT CHANGE UP
GLUCOSE SERPL-MCNC: 125 MG/DL — HIGH (ref 70–99)
HCT VFR BLD CALC: 34.1 % — LOW (ref 39–50)
HGB BLD-MCNC: 11.3 G/DL — LOW (ref 13–17)
MCHC RBC-ENTMCNC: 27.1 PG — SIGNIFICANT CHANGE UP (ref 27–34)
MCHC RBC-ENTMCNC: 33.1 GM/DL — SIGNIFICANT CHANGE UP (ref 32–36)
MCV RBC AUTO: 81.8 FL — SIGNIFICANT CHANGE UP (ref 80–100)
NRBC # BLD: 0 /100 WBCS — SIGNIFICANT CHANGE UP (ref 0–0)
PLATELET # BLD AUTO: 382 K/UL — SIGNIFICANT CHANGE UP (ref 150–400)
POTASSIUM SERPL-MCNC: 3.4 MMOL/L — LOW (ref 3.5–5.3)
POTASSIUM SERPL-SCNC: 3.4 MMOL/L — LOW (ref 3.5–5.3)
PROT SERPL-MCNC: 7.1 G/DL — SIGNIFICANT CHANGE UP (ref 6–8.3)
RBC # BLD: 4.17 M/UL — LOW (ref 4.2–5.8)
RBC # FLD: 12.8 % — SIGNIFICANT CHANGE UP (ref 10.3–14.5)
SODIUM SERPL-SCNC: 142 MMOL/L — SIGNIFICANT CHANGE UP (ref 135–145)
SPECIMEN SOURCE: SIGNIFICANT CHANGE UP
SPECIMEN SOURCE: SIGNIFICANT CHANGE UP
WBC # BLD: 9.06 K/UL — SIGNIFICANT CHANGE UP (ref 3.8–10.5)
WBC # FLD AUTO: 9.06 K/UL — SIGNIFICANT CHANGE UP (ref 3.8–10.5)

## 2020-08-08 PROCEDURE — 99233 SBSQ HOSP IP/OBS HIGH 50: CPT | Mod: GC

## 2020-08-08 RX ORDER — POTASSIUM CHLORIDE 20 MEQ
40 PACKET (EA) ORAL ONCE
Refills: 0 | Status: COMPLETED | OUTPATIENT
Start: 2020-08-08 | End: 2020-08-08

## 2020-08-08 RX ADMIN — PIPERACILLIN AND TAZOBACTAM 25 GRAM(S): 4; .5 INJECTION, POWDER, LYOPHILIZED, FOR SOLUTION INTRAVENOUS at 21:33

## 2020-08-08 RX ADMIN — Medication 600 MILLIGRAM(S): at 20:52

## 2020-08-08 RX ADMIN — HEPARIN SODIUM 5000 UNIT(S): 5000 INJECTION INTRAVENOUS; SUBCUTANEOUS at 06:18

## 2020-08-08 RX ADMIN — HEPARIN SODIUM 5000 UNIT(S): 5000 INJECTION INTRAVENOUS; SUBCUTANEOUS at 14:50

## 2020-08-08 RX ADMIN — PIPERACILLIN AND TAZOBACTAM 25 GRAM(S): 4; .5 INJECTION, POWDER, LYOPHILIZED, FOR SOLUTION INTRAVENOUS at 06:17

## 2020-08-08 RX ADMIN — Medication 30 MILLIGRAM(S): at 22:01

## 2020-08-08 RX ADMIN — SENNA PLUS 2 TABLET(S): 8.6 TABLET ORAL at 21:34

## 2020-08-08 RX ADMIN — Medication 30 MILLIGRAM(S): at 21:46

## 2020-08-08 RX ADMIN — HYDROMORPHONE HYDROCHLORIDE 1 MILLIGRAM(S): 2 INJECTION INTRAMUSCULAR; INTRAVENOUS; SUBCUTANEOUS at 06:17

## 2020-08-08 RX ADMIN — SODIUM CHLORIDE 75 MILLILITER(S): 9 INJECTION INTRAMUSCULAR; INTRAVENOUS; SUBCUTANEOUS at 21:33

## 2020-08-08 RX ADMIN — PANTOPRAZOLE SODIUM 40 MILLIGRAM(S): 20 TABLET, DELAYED RELEASE ORAL at 12:52

## 2020-08-08 RX ADMIN — PIPERACILLIN AND TAZOBACTAM 25 GRAM(S): 4; .5 INJECTION, POWDER, LYOPHILIZED, FOR SOLUTION INTRAVENOUS at 14:50

## 2020-08-08 RX ADMIN — AMLODIPINE BESYLATE 5 MILLIGRAM(S): 2.5 TABLET ORAL at 06:18

## 2020-08-08 RX ADMIN — Medication 40 MILLIEQUIVALENT(S): at 14:50

## 2020-08-08 RX ADMIN — HEPARIN SODIUM 5000 UNIT(S): 5000 INJECTION INTRAVENOUS; SUBCUTANEOUS at 21:34

## 2020-08-08 RX ADMIN — HYDROMORPHONE HYDROCHLORIDE 1 MILLIGRAM(S): 2 INJECTION INTRAMUSCULAR; INTRAVENOUS; SUBCUTANEOUS at 07:10

## 2020-08-08 RX ADMIN — ATORVASTATIN CALCIUM 40 MILLIGRAM(S): 80 TABLET, FILM COATED ORAL at 21:34

## 2020-08-08 RX ADMIN — VALSARTAN 160 MILLIGRAM(S): 80 TABLET ORAL at 06:19

## 2020-08-08 RX ADMIN — ENTECAVIR 0.5 MILLIGRAM(S): 0.5 TABLET ORAL at 12:53

## 2020-08-08 NOTE — ANESTHESIA FOLLOW-UP NOTE - NSPROCEDUREFT_GEN_ALL_CORE
s/p single shot bilateral TAP block on 8/7/20  Denies any regional anesthetic related complaints  no motor or sensory deficits  pain 5/10, management as per primary team

## 2020-08-08 NOTE — PROGRESS NOTE ADULT - PROBLEM SELECTOR PLAN 1
- Biopsy revealed tubulovillous adenoma   - S/P laparoscopic Rt hemicolectomy   post op day 1 due to concern for underlying malignancy in view of colonic mass and clostridium septicum bacteremia.  - post op  day 1 - started on clear liquid diet

## 2020-08-08 NOTE — PROGRESS NOTE ADULT - PROBLEM SELECTOR PLAN 1
path reviewed and discussed with patient  suspect there is cancer deeper to what was able to be biopsied  favor inpatient right hemicolectomy to relieve impending obstruction and stage his presumed malignancy  surgery following, planned for OR today  ivf   adv diet as per surgery  prn pain control  to follow pathology

## 2020-08-08 NOTE — PROGRESS NOTE ADULT - SUBJECTIVE AND OBJECTIVE BOX
Patient is a 45y old  Male who presents with a chief complaint of Possible Colitis (08 Aug 2020 07:14)      HPI:  Mr Mahmood is a 44 yo M who presented to [place] ED with RLQ pain found to have ascending colon thickening concerning for colitis or neoplasm. PMH Hepatitis B, HTN, HLD.  Pt seen and examined at bedside. He reports 6/10 RLQ pain which began at 2AM. Pain does not radiate elsewhere, he denies any injury to the area, he has decreased PO intake. He also reports Temp of 100.1F at home. Denies any associated chills. Denies headaches, nausea, vomiting, chest pain, SOB, palpitations, constipation, diarrhea, melena, hematochezia, dysuria. Last bowel movement was this morning and reportedly well formed and non-bloody. He has never had a colonoscopy. Denies family hx of colon cancer.   He is on Entecavir for a history of Hepatitis B and continues to take this medication. \par Patient has a different MRN # at [facility] and labs and imaging can be found under MRN # [id number].   Noted to have Leukocytosis of 18k. Cr clearance: 89.2  CT scan pasted below:       admitted with rt lower quadrant pain   with colitis , colon mas  post biopsy -  POD1 s/p lap R hemicolectomy, appendectomy    pt seen and examine today-  alert awake , started on clear liquid diet tolerating well , denies nausea / vomiting , no abd pain , oob .   INTERVAL HPI/OVERNIGHT EVENTS:  T(C): 36.8 (08-08-20 @ 13:15), Max: 37.7 (08-08-20 @ 05:07)  HR: 71 (08-08-20 @ 13:15) (71 - 88)  BP: 129/84 (08-08-20 @ 13:15) (103/61 - 135/87)  RR: 18 (08-08-20 @ 13:15) (13 - 18)  SpO2: 98% (08-08-20 @ 13:15) (94% - 99%)  Wt(kg): --  I&O's Summary    07 Aug 2020 07:01  -  08 Aug 2020 07:00  --------------------------------------------------------  IN: 150 mL / OUT: 3800 mL / NET: -3650 mL    08 Aug 2020 07:01  -  08 Aug 2020 14:30  --------------------------------------------------------  IN: 480 mL / OUT: 200 mL / NET: 280 mL        REVIEW OF SYSTEMS:  CONSTITUTIONAL: No fever, weight loss, or fatigue  EYES: No eye pain, visual disturbances, or discharge  ENMT:No sinus or throat pain  NECK: No pain or stiffness  RESPIRATORY: No cough, wheezing, chills or hemoptysis; No shortness of breath  CARDIOVASCULAR: No chest pain, palpitations, dizziness, or leg swelling  GASTROINTESTINAL: No abdominal or epigastric pain. No nausea, vomiting, No diarrhea or constipation.  GENITOURINARY: No dysuria, frequency, hematuria, or incontinence  NEUROLOGICAL: No headaches, memory loss, loss of strength, numbness, or tremors  SKIN: No itching, burning, rashes, or lesions   MUSCULOSKELETAL: No joint pain or swelling; No muscle, back, or extremity pain      PHYSICAL EXAM:  GENERAL: NAD, well-groomed, well-developed  HEAD:  Atraumatic, Normocephalic  EYES: EOMI, PERRLA, conjunctiva and sclera clear  ENMT:; Moist mucous membranes,   NECK: Supple, No JVD,   NERVOUS SYSTEM:  Alert & Oriented X3, Motor Strength 5/5 B/L upper and lower extremities; DTRs 2+ intact and symmetric  CHEST/LUNG:  percussion bilaterally; No rales, rhonchi, wheezing,   HEART: Regular rate and rhythm; No murmurs,  no tachy   ABDOMEN: Soft,  + tender surgical site , Nondistended; Bowel sounds present   slow , surgical site dry clean   EXTREMITIES:  2+ Peripheral Pulses, No clubbing, cyanosis, or edema  SKIN: No rashes or lesions  gu intact   MEDICATIONS  (STANDING):  amLODIPine   Tablet 5 milliGRAM(s) Oral daily  atorvastatin 40 milliGRAM(s) Oral at bedtime  entecavir 0.5 milliGRAM(s) Oral daily  heparin   Injectable 5000 Unit(s) SubCutaneous every 8 hours  pantoprazole  Injectable 40 milliGRAM(s) IV Push daily  piperacillin/tazobactam IVPB.. 3.375 Gram(s) IV Intermittent every 8 hours  senna 2 Tablet(s) Oral at bedtime  sodium chloride 0.9%. 1000 milliLiter(s) (75 mL/Hr) IV Continuous <Continuous>  valsartan 160 milliGRAM(s) Oral daily    MEDICATIONS  (PRN):  acetaminophen   Tablet .. 650 milliGRAM(s) Oral every 6 hours PRN Temp greater or equal to 38C (100.4F), Mild Pain (1 - 3)  HYDROmorphone  Injectable 1 milliGRAM(s) IV Push every 3 hours PRN Severe Pain (7 - 10)  ketorolac   Injectable 30 milliGRAM(s) IV Push every 6 hours PRN Moderate Pain (4 - 6)      LABS:                        11.3   9.06  )-----------( 382      ( 08 Aug 2020 06:15 )             34.1     08-08    142  |  110<H>  |  6<L>  ----------------------------<  125<H>  3.4<L>   |  25  |  0.77    Ca    8.4<L>      08 Aug 2020 06:15  Phos  3.4     08-07  Mg     2.7     08-07    TPro  7.1  /  Alb  3.0<L>  /  TBili  0.7  /  DBili  x   /  AST  19  /  ALT  26  /  AlkPhos  64  08-08    PT/INR - ( 07 Aug 2020 07:10 )   PT: 13.0 sec;   INR: 1.12 ratio         PTT - ( 07 Aug 2020 07:10 )  PTT:31.4 sec                    RADIOLOGY & ADDITIONAL TESTS:    Imaging Personally Reviewed:   no new test     Advance Directives:  full code

## 2020-08-08 NOTE — PROGRESS NOTE ADULT - PROBLEM SELECTOR PLAN 3
clostridium septicum bacteremia   Repeat blood cultures now negative  to date   , stool cult neg  ; continue Zosyn  ID follow up ongoing

## 2020-08-08 NOTE — CHART NOTE - NSCHARTNOTEFT_GEN_A_CORE
Assessment:  Pt seen for nutrition follow up due to continued inadequate diet status.  Chart reviewed, hospital course noted.  Pt is POD#1 post R hemicolectomy and ileocolostomy.  Pt tolerating sips/small amounts of clear liquids.   No complaints offered.  Endorses bubbles in GI tract but no flatus or BM as yet.     Factors impacting intake: [ ] none [ ] nausea  [ ] vomiting [ ] diarrhea [ ] constipation  [ ]chewing problems [ ] swallowing issues  [x] other: s/p bowel surgery    Diet Presciption: Diet, Clear Liquid (08-07-20 @ 18:45)    Intake: fair    Current Weight: Weight (kg): 81.6 (08-07 @ 00:23), 81.6 (08-02 @ 11:13), no updated weight  % Weight Change    Pertinent Medications: MEDICATIONS  (STANDING):  amLODIPine   Tablet 5 milliGRAM(s) Oral daily  atorvastatin 40 milliGRAM(s) Oral at bedtime  entecavir 0.5 milliGRAM(s) Oral daily  heparin   Injectable 5000 Unit(s) SubCutaneous every 8 hours  pantoprazole  Injectable 40 milliGRAM(s) IV Push daily  piperacillin/tazobactam IVPB.. 3.375 Gram(s) IV Intermittent every 8 hours  senna 2 Tablet(s) Oral at bedtime  sodium chloride 0.9%. 1000 milliLiter(s) (75 mL/Hr) IV Continuous <Continuous>  valsartan 160 milliGRAM(s) Oral daily    MEDICATIONS  (PRN):  acetaminophen   Tablet .. 650 milliGRAM(s) Oral every 6 hours PRN Temp greater or equal to 38C (100.4F), Mild Pain (1 - 3)  HYDROmorphone  Injectable 1 milliGRAM(s) IV Push every 3 hours PRN Severe Pain (7 - 10)  ketorolac   Injectable 30 milliGRAM(s) IV Push every 6 hours PRN Moderate Pain (4 - 6)    Pertinent Labs: 08-08 Na142 mmol/L Glu 125 mg/dL<H> K+ 3.4 mmol/L<L> Cr  0.77 mg/dL BUN 6 mg/dL<L> 08-07 Phos 3.4 mg/dL 08-08 Alb 3.0 g/dL<L>    Skin: no pressure injuries  Edema: none    Estimated Needs:   [x] no change since previous assessment  [ ] recalculated:     Previous Nutrition Diagnosis:   [ ] Inadequate Energy Intake [ ]Inadequate Oral Intake [ ] Excessive Energy Intake   [ ] Underweight [ ] Increased Nutrient Needs [ ] Overweight/Obesity   [x] Altered GI Function [ ] Unintended Weight Loss [ ] Food & Nutrition Related Knowledge Deficit [ ] Malnutrition     Nutrition Diagnosis is [x] ongoing  [ ] resolved [ ] not applicable     New Nutrition Diagnosis: [x] not applicable       Interventions:   Recommend  [x] Change Diet To:  Advance diet to low fiber as tolerated when medically feasible.  [ ] Nutrition Supplement  [ ] Nutrition Support  [x] Other: daily MVI for micronutrient coverage    Monitoring and Evaluation:   [x] PO intake [ x ] Tolerance to diet prescription [ x ] weights [ x ] labs[ x ] follow up per protocol  [x] other: bowel function s/s GI distress

## 2020-08-08 NOTE — PROGRESS NOTE ADULT - SUBJECTIVE AND OBJECTIVE BOX
SUBJECTIVE:  46 y/o M seen and examined at bedside. No overnight events. Patient with no new complaints at this time, states he is feeling well. He experienced some nausea yesterday, which has resolved. He is tolerating CLD. Denies passing flatus. Patient denies any fevers, chills, chest pain, shortness of breath, nausea, vomiting.    Vital Signs Last 24 Hrs  T(C): 37.7 (08 Aug 2020 05:07), Max: 37.7 (08 Aug 2020 05:07)  T(F): 99.8 (08 Aug 2020 05:07), Max: 99.8 (08 Aug 2020 05:07)  HR: 82 (08 Aug 2020 05:07) (69 - 88)  BP: 119/78 (08 Aug 2020 05:07) (103/61 - 135/87)  BP(mean): --  RR: 18 (08 Aug 2020 05:07) (13 - 18)  SpO2: 95% (08 Aug 2020 05:07) (94% - 99%)    PHYSICAL EXAM:  GENERAL: NAD, resting comfortably in bed, +Grossman  HEAD:  Atraumatic, Normocephalic  ABDOMEN: Incisions dry and intact with steri-strips. Midline dressing clean, dry, intact. Abdomen soft, mildly distended. Tender to palpation surrounding incision sites. Faint BS.  EXT: No calf tenderness   NEUROLOGY: A&O x 3    LABS:                        11.3   9.06  )-----------( 382      ( 08 Aug 2020 06:15 )             34.1     08-08    142  |  110<H>  |  6<L>  ----------------------------<  125<H>  3.4<L>   |  25  |  0.77    Ca    8.4<L>      08 Aug 2020 06:15  Phos  3.4     08-07  Mg     2.7     08-07    TPro  7.1  /  Alb  3.0<L>  /  TBili  0.7  /  DBili  x   /  AST  19  /  ALT  26  /  AlkPhos  64  08-08    PT/INR - ( 07 Aug 2020 07:10 )   PT: 13.0 sec;   INR: 1.12 ratio         PTT - ( 07 Aug 2020 07:10 )  PTT:31.4 sec      RADIOLOGY & ADDITIONAL STUDIES:    ASSESSMENT  45y Male POD1 s/p lap R hemicolectomy, appendectomy and SBR, feeling well, tolerating CLD    PLAN  - incentive spirometer  - pain control, supportive care, OOB  - Clear liquid diet   - continue IV abx  - continue DVT ppx   - serial abdominal exams  - Grossman to be d/c'd this AM, will f/u void trial  - labs in AM, replete lytes PRN  - Will discuss with Dr. Das (covering for Dr. Schafer today)    Surgical Team Contact Information  Spectralink: Ext: [phone] or [phone]  Pager: [phone]

## 2020-08-08 NOTE — PROGRESS NOTE ADULT - ASSESSMENT
Patient seen with IKE oMrgan.    Clinically doing well.    Surgically stable tolerating clears and ambulating.

## 2020-08-08 NOTE — PROGRESS NOTE ADULT - SUBJECTIVE AND OBJECTIVE BOX
INTERVAL HPI/OVERNIGHT EVENTS:  No new overnight event.  No N/V/D.  Tolerating diet.  some pain sp surgery    Allergies    ibuprofen (Other)  ibuprofen (Rash)    Intolerances    General:  No wt loss, fevers, chills, night sweats, fatigue,   Eyes:  Good vision, no reported pain  ENT:  No sore throat, pain, runny nose, dysphagia  CV:  No pain, palpitations, hypo/hypertension  Resp:  No dyspnea, cough, tachypnea, wheezing  GI:  No pain, No nausea, No vomiting, No diarrhea, No constipation, No weight loss, No fever, No pruritis, No rectal bleeding, No tarry stools, No dysphagia,  :  No pain, bleeding, incontinence, nocturia  Muscle:  No pain, weakness  Neuro:  No weakness, tingling, memory problems  Psych:  No fatigue, insomnia, mood problems, depression  Endocrine:  No polyuria, polydipsia, cold/heat intolerance  Heme:  No petechiae, ecchymosis, easy bruisability  Skin:  No rash, tattoos, scars, edema      PHYSICAL EXAM:   Vital Signs:  Vital Signs Last 24 Hrs  T(C): 36.8 (08 Aug 2020 13:15), Max: 37.7 (08 Aug 2020 05:07)  T(F): 98.3 (08 Aug 2020 13:15), Max: 99.8 (08 Aug 2020 05:07)  HR: 71 (08 Aug 2020 13:15) (71 - 88)  BP: 129/84 (08 Aug 2020 13:15) (118/73 - 135/87)  BP(mean): --  RR: 18 (08 Aug 2020 13:15) (16 - 18)  SpO2: 98% (08 Aug 2020 13:15) (94% - 98%)  Daily     Daily I&O's Summary    07 Aug 2020 07:01  -  08 Aug 2020 07:00  --------------------------------------------------------  IN: 150 mL / OUT: 3800 mL / NET: -3650 mL    08 Aug 2020 07:01  -  08 Aug 2020 18:04  --------------------------------------------------------  IN: 480 mL / OUT: 1200 mL / NET: -720 mL        GENERAL:  Appears stated age, well-groomed, well-nourished, no distress  HEENT:  NC/AT,  conjunctivae clear and pink, no thyromegaly, nodules, adenopathy, no JVD, sclera -anicteric  CHEST:  Full & symmetric excursion, no increased effort, breath sounds clear  HEART:  Regular rhythm, S1, S2, no murmur/rub/S3/S4, no abdominal bruit, no edema  ABDOMEN:  Soft, non-tender, non-distended, normoactive bowel sounds,  no masses ,no hepato-splenomegaly, no signs of chronic liver disease  EXTEREMITIES:  no cyanosis,clubbing or edema  SKIN:  No rash/erythema/ecchymoses/petechiae/wounds/abscess/warm/dry  NEURO:  Alert, oriented, no asterixis, no tremor, no encephalopathy      LABS:                        11.3   9.06  )-----------( 382      ( 08 Aug 2020 06:15 )             34.1     08-08    142  |  110<H>  |  6<L>  ----------------------------<  125<H>  3.4<L>   |  25  |  0.77    Ca    8.4<L>      08 Aug 2020 06:15  Phos  3.4     08-07  Mg     2.7     08-07    TPro  7.1  /  Alb  3.0<L>  /  TBili  0.7  /  DBili  x   /  AST  19  /  ALT  26  /  AlkPhos  64  08-08    PT/INR - ( 07 Aug 2020 07:10 )   PT: 13.0 sec;   INR: 1.12 ratio         PTT - ( 07 Aug 2020 07:10 )  PTT:31.4 sec    amylase   lipase  RADIOLOGY & ADDITIONAL TESTS:

## 2020-08-09 LAB
ANION GAP SERPL CALC-SCNC: 3 MMOL/L — LOW (ref 5–17)
BASOPHILS # BLD AUTO: 0.03 K/UL — SIGNIFICANT CHANGE UP (ref 0–0.2)
BASOPHILS NFR BLD AUTO: 0.4 % — SIGNIFICANT CHANGE UP (ref 0–2)
BUN SERPL-MCNC: 8 MG/DL — SIGNIFICANT CHANGE UP (ref 7–23)
CALCIUM SERPL-MCNC: 8.1 MG/DL — LOW (ref 8.5–10.1)
CHLORIDE SERPL-SCNC: 113 MMOL/L — HIGH (ref 96–108)
CO2 SERPL-SCNC: 28 MMOL/L — SIGNIFICANT CHANGE UP (ref 22–31)
CREAT SERPL-MCNC: 0.79 MG/DL — SIGNIFICANT CHANGE UP (ref 0.5–1.3)
EOSINOPHIL # BLD AUTO: 0.09 K/UL — SIGNIFICANT CHANGE UP (ref 0–0.5)
EOSINOPHIL NFR BLD AUTO: 1.2 % — SIGNIFICANT CHANGE UP (ref 0–6)
GLUCOSE SERPL-MCNC: 104 MG/DL — HIGH (ref 70–99)
HCT VFR BLD CALC: 33 % — LOW (ref 39–50)
HGB BLD-MCNC: 10.7 G/DL — LOW (ref 13–17)
IMM GRANULOCYTES NFR BLD AUTO: 1 % — SIGNIFICANT CHANGE UP (ref 0–1.5)
LYMPHOCYTES # BLD AUTO: 1.88 K/UL — SIGNIFICANT CHANGE UP (ref 1–3.3)
LYMPHOCYTES # BLD AUTO: 25.8 % — SIGNIFICANT CHANGE UP (ref 13–44)
MCHC RBC-ENTMCNC: 27.5 PG — SIGNIFICANT CHANGE UP (ref 27–34)
MCHC RBC-ENTMCNC: 32.4 GM/DL — SIGNIFICANT CHANGE UP (ref 32–36)
MCV RBC AUTO: 84.8 FL — SIGNIFICANT CHANGE UP (ref 80–100)
MONOCYTES # BLD AUTO: 0.58 K/UL — SIGNIFICANT CHANGE UP (ref 0–0.9)
MONOCYTES NFR BLD AUTO: 7.9 % — SIGNIFICANT CHANGE UP (ref 2–14)
NEUTROPHILS # BLD AUTO: 4.65 K/UL — SIGNIFICANT CHANGE UP (ref 1.8–7.4)
NEUTROPHILS NFR BLD AUTO: 63.7 % — SIGNIFICANT CHANGE UP (ref 43–77)
NRBC # BLD: 0 /100 WBCS — SIGNIFICANT CHANGE UP (ref 0–0)
PLATELET # BLD AUTO: 347 K/UL — SIGNIFICANT CHANGE UP (ref 150–400)
POTASSIUM SERPL-MCNC: 3.8 MMOL/L — SIGNIFICANT CHANGE UP (ref 3.5–5.3)
POTASSIUM SERPL-SCNC: 3.8 MMOL/L — SIGNIFICANT CHANGE UP (ref 3.5–5.3)
RBC # BLD: 3.89 M/UL — LOW (ref 4.2–5.8)
RBC # FLD: 13 % — SIGNIFICANT CHANGE UP (ref 10.3–14.5)
SODIUM SERPL-SCNC: 144 MMOL/L — SIGNIFICANT CHANGE UP (ref 135–145)
WBC # BLD: 7.3 K/UL — SIGNIFICANT CHANGE UP (ref 3.8–10.5)
WBC # FLD AUTO: 7.3 K/UL — SIGNIFICANT CHANGE UP (ref 3.8–10.5)

## 2020-08-09 PROCEDURE — 99233 SBSQ HOSP IP/OBS HIGH 50: CPT | Mod: GC

## 2020-08-09 RX ORDER — PANTOPRAZOLE SODIUM 20 MG/1
40 TABLET, DELAYED RELEASE ORAL
Refills: 0 | Status: DISCONTINUED | OUTPATIENT
Start: 2020-08-09 | End: 2020-08-11

## 2020-08-09 RX ORDER — OXYCODONE HYDROCHLORIDE 5 MG/1
5 TABLET ORAL EVERY 4 HOURS
Refills: 0 | Status: DISCONTINUED | OUTPATIENT
Start: 2020-08-09 | End: 2020-08-11

## 2020-08-09 RX ORDER — OXYCODONE HYDROCHLORIDE 5 MG/1
10 TABLET ORAL EVERY 4 HOURS
Refills: 0 | Status: DISCONTINUED | OUTPATIENT
Start: 2020-08-09 | End: 2020-08-11

## 2020-08-09 RX ORDER — SODIUM CHLORIDE 9 MG/ML
1000 INJECTION INTRAMUSCULAR; INTRAVENOUS; SUBCUTANEOUS
Refills: 0 | Status: DISCONTINUED | OUTPATIENT
Start: 2020-08-09 | End: 2020-08-10

## 2020-08-09 RX ORDER — KETOROLAC TROMETHAMINE 30 MG/ML
30 SYRINGE (ML) INJECTION EVERY 6 HOURS
Refills: 0 | Status: DISCONTINUED | OUTPATIENT
Start: 2020-08-09 | End: 2020-08-11

## 2020-08-09 RX ORDER — ACETAMINOPHEN 500 MG
1000 TABLET ORAL EVERY 6 HOURS
Refills: 0 | Status: DISCONTINUED | OUTPATIENT
Start: 2020-08-09 | End: 2020-08-11

## 2020-08-09 RX ADMIN — PANTOPRAZOLE SODIUM 40 MILLIGRAM(S): 20 TABLET, DELAYED RELEASE ORAL at 05:49

## 2020-08-09 RX ADMIN — Medication 1000 MILLIGRAM(S): at 17:35

## 2020-08-09 RX ADMIN — ENTECAVIR 0.5 MILLIGRAM(S): 0.5 TABLET ORAL at 12:42

## 2020-08-09 RX ADMIN — SODIUM CHLORIDE 35 MILLILITER(S): 9 INJECTION INTRAMUSCULAR; INTRAVENOUS; SUBCUTANEOUS at 20:26

## 2020-08-09 RX ADMIN — HEPARIN SODIUM 5000 UNIT(S): 5000 INJECTION INTRAVENOUS; SUBCUTANEOUS at 21:27

## 2020-08-09 RX ADMIN — HEPARIN SODIUM 5000 UNIT(S): 5000 INJECTION INTRAVENOUS; SUBCUTANEOUS at 14:42

## 2020-08-09 RX ADMIN — Medication 1000 MILLIGRAM(S): at 05:48

## 2020-08-09 RX ADMIN — Medication 1000 MILLIGRAM(S): at 12:42

## 2020-08-09 RX ADMIN — PIPERACILLIN AND TAZOBACTAM 25 GRAM(S): 4; .5 INJECTION, POWDER, LYOPHILIZED, FOR SOLUTION INTRAVENOUS at 21:27

## 2020-08-09 RX ADMIN — Medication 1000 MILLIGRAM(S): at 06:58

## 2020-08-09 RX ADMIN — VALSARTAN 160 MILLIGRAM(S): 80 TABLET ORAL at 05:49

## 2020-08-09 RX ADMIN — SENNA PLUS 2 TABLET(S): 8.6 TABLET ORAL at 21:28

## 2020-08-09 RX ADMIN — PIPERACILLIN AND TAZOBACTAM 25 GRAM(S): 4; .5 INJECTION, POWDER, LYOPHILIZED, FOR SOLUTION INTRAVENOUS at 05:49

## 2020-08-09 RX ADMIN — ATORVASTATIN CALCIUM 40 MILLIGRAM(S): 80 TABLET, FILM COATED ORAL at 21:28

## 2020-08-09 RX ADMIN — Medication 1000 MILLIGRAM(S): at 13:42

## 2020-08-09 RX ADMIN — AMLODIPINE BESYLATE 5 MILLIGRAM(S): 2.5 TABLET ORAL at 05:49

## 2020-08-09 RX ADMIN — PIPERACILLIN AND TAZOBACTAM 25 GRAM(S): 4; .5 INJECTION, POWDER, LYOPHILIZED, FOR SOLUTION INTRAVENOUS at 14:42

## 2020-08-09 RX ADMIN — HEPARIN SODIUM 5000 UNIT(S): 5000 INJECTION INTRAVENOUS; SUBCUTANEOUS at 05:53

## 2020-08-09 NOTE — PROGRESS NOTE ADULT - PROBLEM SELECTOR PLAN 1
- Biopsy revealed tubulovillous adenoma   - S/P laparoscopic Rt hemicolectomy   post op day 2 due to concern for underlying malignancy in view of colonic mass and clostridium septicum bacteremia.  - post op  day 2 - started on clear liquid diet

## 2020-08-09 NOTE — PROGRESS NOTE ADULT - SUBJECTIVE AND OBJECTIVE BOX
INTERVAL HPI/OVERNIGHT EVENTS:  No new overnight event.  No N/V/D.  Tolerating diet.  clears some pain    Allergies    ibuprofen (Other)  ibuprofen (Rash)    Intolerances          General:  No wt loss, fevers, chills, night sweats, fatigue,   Eyes:  Good vision, no reported pain  ENT:  No sore throat, pain, runny nose, dysphagia  CV:  No pain, palpitations, hypo/hypertension  Resp:  No dyspnea, cough, tachypnea, wheezing  GI:  No pain, No nausea, No vomiting, No diarrhea, No constipation, No weight loss, No fever, No pruritis, No rectal bleeding, No tarry stools, No dysphagia,  :  No pain, bleeding, incontinence, nocturia  Muscle:  No pain, weakness  Neuro:  No weakness, tingling, memory problems  Psych:  No fatigue, insomnia, mood problems, depression  Endocrine:  No polyuria, polydipsia, cold/heat intolerance  Heme:  No petechiae, ecchymosis, easy bruisability  Skin:  No rash, tattoos, scars, edema      PHYSICAL EXAM:   Vital Signs:  Vital Signs Last 24 Hrs  T(C): 36.8 (09 Aug 2020 12:36), Max: 37.2 (08 Aug 2020 21:25)  T(F): 98.2 (09 Aug 2020 12:36), Max: 98.9 (08 Aug 2020 21:25)  HR: 80 (09 Aug 2020 12:36) (71 - 80)  BP: 123/85 (09 Aug 2020 12:36) (117/76 - 123/85)  BP(mean): --  RR: 18 (09 Aug 2020 12:36) (18 - 18)  SpO2: 96% (09 Aug 2020 12:36) (96% - 97%)  Daily     Daily I&O's Summary    08 Aug 2020 07:01  -  09 Aug 2020 07:00  --------------------------------------------------------  IN: 1630 mL / OUT: 1950 mL / NET: -320 mL    09 Aug 2020 07:01  -  09 Aug 2020 18:06  --------------------------------------------------------  IN: 600 mL / OUT: 1400 mL / NET: -800 mL        GENERAL:  Appears stated age, well-groomed, well-nourished, no distress  HEENT:  NC/AT,  conjunctivae clear and pink, no thyromegaly, nodules, adenopathy, no JVD, sclera -anicteric  CHEST:  Full & symmetric excursion, no increased effort, breath sounds clear  HEART:  Regular rhythm, S1, S2, no murmur/rub/S3/S4, no abdominal bruit, no edema  ABDOMEN:  Soft, non-tender, non-distended, normoactive bowel sounds,  no masses ,no hepato-splenomegaly, no signs of chronic liver disease  EXTEREMITIES:  no cyanosis,clubbing or edema  SKIN:  No rash/erythema/ecchymoses/petechiae/wounds/abscess/warm/dry  NEURO:  Alert, oriented, no asterixis, no tremor, no encephalopathy      LABS:                        10.7   7.30  )-----------( 347      ( 09 Aug 2020 07:24 )             33.0     08-09    144  |  113<H>  |  8   ----------------------------<  104<H>  3.8   |  28  |  0.79    Ca    8.1<L>      09 Aug 2020 07:24    TPro  7.1  /  Alb  3.0<L>  /  TBili  0.7  /  DBili  x   /  AST  19  /  ALT  26  /  AlkPhos  64  08-08        amylase   lipase  RADIOLOGY & ADDITIONAL TESTS:

## 2020-08-09 NOTE — PROGRESS NOTE ADULT - SUBJECTIVE AND OBJECTIVE BOX
Patient is a 45y old  Male who presents with a chief complaint of Possible Colitis (09 Aug 2020 07:15)      HPI:  Mr Mahmood is a 46 yo M who presented to [place] ED with RLQ pain found to have ascending colon thickening concerning for colitis or neoplasm. PMH Hepatitis B, HTN, HLD.  Pt seen and examined at bedside. He reports 6/10 RLQ pain which began at 2AM. Pain does not radiate elsewhere, he denies any injury to the area, he has decreased PO intake. He also reports Temp of 100.1F at home. Denies any associated chills. Denies headaches, nausea, vomiting, chest pain, SOB, palpitations, constipation, diarrhea, melena, hematochezia, dysuria. Last bowel movement was this morning and reportedly well formed and non-bloody. He has never had a colonoscopy. Denies family hx of colon cancer.   He is on Entecavir for a history of Hepatitis B and continues to take this medication. \par Patient has a different MRN # at [place] and labs and imaging can be found under MRN # [id number].   Noted to have Leukocytosis of 18k. Cr clearance: 89.2  CT scan pasted below:     admitted with rt lower quadrant pain   with colitis , colon mas  post biopsy -  POD2  s/p lap R hemicolectomy, appendectomy    pt seen and examine today-   pt feeling better   clear liquid diet tolerating well  , no abd pain  , no distress .     INTERVAL HPI/OVERNIGHT EVENTS:  T(C): 36.8 (08-09-20 @ 05:01), Max: 37.2 (08-08-20 @ 21:25)  HR: 71 (08-09-20 @ 05:01) (71 - 74)  BP: 117/76 (08-09-20 @ 05:01) (117/76 - 129/84)  RR: 18 (08-09-20 @ 05:01) (18 - 18)  SpO2: 96% (08-09-20 @ 05:01) (96% - 98%)  Wt(kg): --  I&O's Summary    08 Aug 2020 07:01  -  09 Aug 2020 07:00  --------------------------------------------------------  IN: 1630 mL / OUT: 1950 mL / NET: -320 mL    09 Aug 2020 07:01  -  09 Aug 2020 11:42  --------------------------------------------------------  IN: 0 mL / OUT: 600 mL / NET: -600 mL    REVIEW OF SYSTEMS:  CONSTITUTIONAL: No fever, weight loss, or fatigue  EYES: No eye pain, visual disturbances, or discharge  ENMT:No sinus or throat pain  NECK: No pain or stiffness  RESPIRATORY: No cough, wheezing, chills  , No shortness of breath  CARDIOVASCULAR: No chest pain, palpitations, dizziness, or leg swelling  GASTROINTESTINAL: No abdominal or epigastric pain. No nausea, vomiting, No diarrhea or constipation.  GENITOURINARY: No dysuria, frequency, hematuria, or incontinence  NEUROLOGICAL: No headaches, memory loss, loss of strength, numbness, or tremors  SKIN: No itching, burning, rashes, or lesions   MUSCULOSKELETAL: No joint pain or swelling; No muscle, back, or extremity pain      PHYSICAL EXAM:  GENERAL: NAD, well-groomed, well-developed  HEAD:  Atraumatic, Normocephalic  EYES: EOMI, PERRLA, conjunctiva and sclera clear  ENMT:; Moist mucous membranes,   NECK: Supple, No JVD,   NERVOUS SYSTEM:  Alert & Oriented X3, Motor Strength 5/5 B/L upper and lower extremities; DTRs 2+ intact and symmetric  CHEST/LUNG:  percussion bilaterally; No rales, rhonchi, wheezing,   HEART: Regular rate and rhythm; No murmurs,  no tachy   ABDOMEN: Soft, mild  + tender surgical site , Nondistended; Bowel sounds present   slow , surgical site dry clean +  EXTREMITIES:  2+ Peripheral Pulses, No clubbing, cyanosis, or edema  SKIN: No rashes or lesions  gu intact           MEDICATIONS  (STANDING):  acetaminophen   Tablet .. 1000 milliGRAM(s) Oral every 6 hours  amLODIPine   Tablet 5 milliGRAM(s) Oral daily  atorvastatin 40 milliGRAM(s) Oral at bedtime  entecavir 0.5 milliGRAM(s) Oral daily  heparin   Injectable 5000 Unit(s) SubCutaneous every 8 hours  pantoprazole    Tablet 40 milliGRAM(s) Oral before breakfast  piperacillin/tazobactam IVPB.. 3.375 Gram(s) IV Intermittent every 8 hours  senna 2 Tablet(s) Oral at bedtime  sodium chloride 0.9%. 1000 milliLiter(s) (75 mL/Hr) IV Continuous <Continuous>  valsartan 160 milliGRAM(s) Oral daily    MEDICATIONS  (PRN):  guaiFENesin  milliGRAM(s) Oral every 12 hours PRN Cough/phlegm  ketorolac   Injectable 30 milliGRAM(s) IV Push every 6 hours PRN Mild Pain (1 - 3)  oxyCODONE    IR 5 milliGRAM(s) Oral every 4 hours PRN Moderate Pain (4 - 6)  oxyCODONE    IR 10 milliGRAM(s) Oral every 4 hours PRN Severe Pain (7 - 10)      LABS:                        10.7   7.30  )-----------( 347      ( 09 Aug 2020 07:24 )             33.0     08-09    144  |  113<H>  |  8   ----------------------------<  104<H>  3.8   |  28  |  0.79    Ca    8.1<L>      09 Aug 2020 07:24    TPro  7.1  /  Alb  3.0<L>  /  TBili  0.7  /  DBili  x   /  AST  19  /  ALT  26  /  AlkPhos  64  08-08                    RADIOLOGY & ADDITIONAL TESTS:    Imaging Personally Reviewed:     no new test   Advance Directives:  full code

## 2020-08-09 NOTE — PROGRESS NOTE ADULT - PROBLEM SELECTOR PLAN 3
clostridium septicum bacteremia   Repeat blood cultures now negative  to date   , stool cult neg  ; continue Zosyn 3.375 gm q8hr   ID follow up ongoing

## 2020-08-09 NOTE — PROGRESS NOTE ADULT - SUBJECTIVE AND OBJECTIVE BOX
Postoperative Day #: 2    45y Male admitted with Crohn's disease of large intestine without complication  S/P Right hemicolectomy with subsequent removal of appendix and small bowel resection  .    Patient seen and examined bedside resting comfortably.   No complaints offered.  Pt is much improved.  Feels well.  Minimal discomfort.  Tolerating clear liquid diet.  No flatus or BM yet.  Voiding without issues.  Ambulating.  Denies fevers, chills, N/V, SOB, chest pain, back pain.        T(F): 98.2 (08-09-20 @ 05:01), Max: 98.9 (08-08-20 @ 21:25)  HR: 71 (08-09-20 @ 05:01) (71 - 81)  BP: 117/76 (08-09-20 @ 05:01) (117/76 - 129/84)  RR: 18 (08-09-20 @ 05:01) (18 - 18)  SpO2: 96% (08-09-20 @ 05:01) (96% - 98%)  Wt(kg): --  CAPILLARY BLOOD GLUCOSE          PHYSICAL EXAM:  General: NAD  Neuro:  Alert & oriented x 3  CV: +S1+S2 regular rate and rhythm  Lung: clear to ausculation bilaterally  Abdomen: BS+ Soft, Incisions intact- clean and dry. Mild tenderness to palpation.  Mildly distended  Extremities: no pedal edema or calf tenderness noted -Negative homans sign.       LABS:                        11.3   9.06  )-----------( 382      ( 08 Aug 2020 06:15 )             34.1     08-08    142  |  110<H>  |  6<L>  ----------------------------<  125<H>  3.4<L>   |  25  |  0.77    Ca    8.4<L>      08 Aug 2020 06:15    TPro  7.1  /  Alb  3.0<L>  /  TBili  0.7  /  DBili  x   /  AST  19  /  ALT  26  /  AlkPhos  64  08-08      I&O's Detail    08 Aug 2020 07:01  -  09 Aug 2020 07:00  --------------------------------------------------------  IN:    Oral Fluid: 480 mL    sodium chloride 0.9%.: 900 mL    Solution: 150 mL  Total IN: 1530 mL    OUT:    Voided: 1950 mL  Total OUT: 1950 mL    Total NET: -420 mL          Impression: 45y Male admitted with Crohn's disease of large intestine without complication  S/P Right hemicolectomy with subsequent removal of appendix and small bowel resection    PMH HTN (hypertension)  HTN (hypertension)  Hepatitis B      Plan:  -continue VTE prophylaxis - Lovenox  -Increase activity with PT, OOB, Ambulate  -Patient instructed on and encouraged incentive spirometry use  -continue local wound care  Continue antibiotics- Zosyn, entecavir  -Continue clear liquid diet.  Once passes flatus may be able to advance diet.   -f/u AM labs  -will discuss with Dr Das (Covering for Dr Schafer)

## 2020-08-09 NOTE — PROGRESS NOTE ADULT - ASSESSMENT
Pt. seen earlier to day with IKE Mirza.  Pain significantly decreased compared to yesterday.  Luis clears but does not want more to eat.  Ambulating.

## 2020-08-10 LAB
ANION GAP SERPL CALC-SCNC: 6 MMOL/L — SIGNIFICANT CHANGE UP (ref 5–17)
BASOPHILS # BLD AUTO: 0.03 K/UL — SIGNIFICANT CHANGE UP (ref 0–0.2)
BASOPHILS NFR BLD AUTO: 0.5 % — SIGNIFICANT CHANGE UP (ref 0–2)
BUN SERPL-MCNC: 8 MG/DL — SIGNIFICANT CHANGE UP (ref 7–23)
CALCIUM SERPL-MCNC: 8.2 MG/DL — LOW (ref 8.5–10.1)
CHLORIDE SERPL-SCNC: 110 MMOL/L — HIGH (ref 96–108)
CO2 SERPL-SCNC: 28 MMOL/L — SIGNIFICANT CHANGE UP (ref 22–31)
CREAT SERPL-MCNC: 0.87 MG/DL — SIGNIFICANT CHANGE UP (ref 0.5–1.3)
EOSINOPHIL # BLD AUTO: 0.18 K/UL — SIGNIFICANT CHANGE UP (ref 0–0.5)
EOSINOPHIL NFR BLD AUTO: 3 % — SIGNIFICANT CHANGE UP (ref 0–6)
GLUCOSE SERPL-MCNC: 96 MG/DL — SIGNIFICANT CHANGE UP (ref 70–99)
HCT VFR BLD CALC: 32.7 % — LOW (ref 39–50)
HGB BLD-MCNC: 10.8 G/DL — LOW (ref 13–17)
IMM GRANULOCYTES NFR BLD AUTO: 1.2 % — SIGNIFICANT CHANGE UP (ref 0–1.5)
LYMPHOCYTES # BLD AUTO: 1.8 K/UL — SIGNIFICANT CHANGE UP (ref 1–3.3)
LYMPHOCYTES # BLD AUTO: 29.6 % — SIGNIFICANT CHANGE UP (ref 13–44)
MCHC RBC-ENTMCNC: 27.7 PG — SIGNIFICANT CHANGE UP (ref 27–34)
MCHC RBC-ENTMCNC: 33 GM/DL — SIGNIFICANT CHANGE UP (ref 32–36)
MCV RBC AUTO: 83.8 FL — SIGNIFICANT CHANGE UP (ref 80–100)
MONOCYTES # BLD AUTO: 0.46 K/UL — SIGNIFICANT CHANGE UP (ref 0–0.9)
MONOCYTES NFR BLD AUTO: 7.6 % — SIGNIFICANT CHANGE UP (ref 2–14)
NEUTROPHILS # BLD AUTO: 3.54 K/UL — SIGNIFICANT CHANGE UP (ref 1.8–7.4)
NEUTROPHILS NFR BLD AUTO: 58.1 % — SIGNIFICANT CHANGE UP (ref 43–77)
NRBC # BLD: 0 /100 WBCS — SIGNIFICANT CHANGE UP (ref 0–0)
PLATELET # BLD AUTO: 391 K/UL — SIGNIFICANT CHANGE UP (ref 150–400)
POTASSIUM SERPL-MCNC: 3.4 MMOL/L — LOW (ref 3.5–5.3)
POTASSIUM SERPL-SCNC: 3.4 MMOL/L — LOW (ref 3.5–5.3)
RBC # BLD: 3.9 M/UL — LOW (ref 4.2–5.8)
RBC # FLD: 12.9 % — SIGNIFICANT CHANGE UP (ref 10.3–14.5)
SODIUM SERPL-SCNC: 144 MMOL/L — SIGNIFICANT CHANGE UP (ref 135–145)
WBC # BLD: 6.08 K/UL — SIGNIFICANT CHANGE UP (ref 3.8–10.5)
WBC # FLD AUTO: 6.08 K/UL — SIGNIFICANT CHANGE UP (ref 3.8–10.5)

## 2020-08-10 PROCEDURE — 99232 SBSQ HOSP IP/OBS MODERATE 35: CPT

## 2020-08-10 PROCEDURE — 99233 SBSQ HOSP IP/OBS HIGH 50: CPT | Mod: GC

## 2020-08-10 RX ORDER — POTASSIUM CHLORIDE 20 MEQ
40 PACKET (EA) ORAL ONCE
Refills: 0 | Status: DISCONTINUED | OUTPATIENT
Start: 2020-08-10 | End: 2020-08-10

## 2020-08-10 RX ORDER — POTASSIUM CHLORIDE 20 MEQ
40 PACKET (EA) ORAL ONCE
Refills: 0 | Status: COMPLETED | OUTPATIENT
Start: 2020-08-10 | End: 2020-08-10

## 2020-08-10 RX ADMIN — ENTECAVIR 0.5 MILLIGRAM(S): 0.5 TABLET ORAL at 12:06

## 2020-08-10 RX ADMIN — Medication 1000 MILLIGRAM(S): at 00:15

## 2020-08-10 RX ADMIN — ATORVASTATIN CALCIUM 40 MILLIGRAM(S): 80 TABLET, FILM COATED ORAL at 22:09

## 2020-08-10 RX ADMIN — Medication 40 MILLIEQUIVALENT(S): at 08:14

## 2020-08-10 RX ADMIN — Medication 30 MILLIGRAM(S): at 22:28

## 2020-08-10 RX ADMIN — VALSARTAN 160 MILLIGRAM(S): 80 TABLET ORAL at 05:21

## 2020-08-10 RX ADMIN — Medication 30 MILLIGRAM(S): at 22:12

## 2020-08-10 RX ADMIN — HEPARIN SODIUM 5000 UNIT(S): 5000 INJECTION INTRAVENOUS; SUBCUTANEOUS at 22:09

## 2020-08-10 RX ADMIN — Medication 1000 MILLIGRAM(S): at 17:22

## 2020-08-10 RX ADMIN — SENNA PLUS 2 TABLET(S): 8.6 TABLET ORAL at 22:09

## 2020-08-10 RX ADMIN — PIPERACILLIN AND TAZOBACTAM 25 GRAM(S): 4; .5 INJECTION, POWDER, LYOPHILIZED, FOR SOLUTION INTRAVENOUS at 15:10

## 2020-08-10 RX ADMIN — Medication 1000 MILLIGRAM(S): at 05:21

## 2020-08-10 RX ADMIN — HEPARIN SODIUM 5000 UNIT(S): 5000 INJECTION INTRAVENOUS; SUBCUTANEOUS at 05:21

## 2020-08-10 RX ADMIN — PIPERACILLIN AND TAZOBACTAM 25 GRAM(S): 4; .5 INJECTION, POWDER, LYOPHILIZED, FOR SOLUTION INTRAVENOUS at 22:09

## 2020-08-10 RX ADMIN — Medication 1000 MILLIGRAM(S): at 11:47

## 2020-08-10 RX ADMIN — PIPERACILLIN AND TAZOBACTAM 25 GRAM(S): 4; .5 INJECTION, POWDER, LYOPHILIZED, FOR SOLUTION INTRAVENOUS at 05:20

## 2020-08-10 RX ADMIN — Medication 1000 MILLIGRAM(S): at 13:00

## 2020-08-10 RX ADMIN — SODIUM CHLORIDE 35 MILLILITER(S): 9 INJECTION INTRAMUSCULAR; INTRAVENOUS; SUBCUTANEOUS at 08:11

## 2020-08-10 RX ADMIN — PANTOPRAZOLE SODIUM 40 MILLIGRAM(S): 20 TABLET, DELAYED RELEASE ORAL at 05:21

## 2020-08-10 RX ADMIN — HEPARIN SODIUM 5000 UNIT(S): 5000 INJECTION INTRAVENOUS; SUBCUTANEOUS at 15:10

## 2020-08-10 RX ADMIN — AMLODIPINE BESYLATE 5 MILLIGRAM(S): 2.5 TABLET ORAL at 05:21

## 2020-08-10 RX ADMIN — Medication 1000 MILLIGRAM(S): at 05:20

## 2020-08-10 NOTE — PROGRESS NOTE ADULT - PROBLEM SELECTOR PLAN 1
s/p right hemicolectomy  post op doing well  surgical care greatly appreciated  plan to Advance diet as tolerated per surgery  outpatient f/u once path back   d/w patient

## 2020-08-10 NOTE — PROGRESS NOTE ADULT - PROBLEM SELECTOR PROBLEM 5
HLD (hyperlipidemia)
HLD (hyperlipidemia)
HTN (hypertension)
Prophylactic measure
Prophylactic measure

## 2020-08-10 NOTE — PROGRESS NOTE ADULT - PROBLEM SELECTOR PLAN 1
- Biopsy revealed tubulovillous adenoma   - S/P laparoscopic Rt hemicolectomy   post op day 3 due to concern for underlying malignancy in view of colonic mass and clostridium septicum bacteremia.  - post op  day 3 - started on clear liquid diet advance in am

## 2020-08-10 NOTE — PROGRESS NOTE ADULT - PROBLEM SELECTOR PROBLEM 4
HLD (hyperlipidemia)
HTN (hypertension)
HTN (hypertension)
Hepatitis B
HLD (hyperlipidemia)

## 2020-08-10 NOTE — PROGRESS NOTE ADULT - SUBJECTIVE AND OBJECTIVE BOX
SURGERY      SUBJECTIVE:   Patient seen at bedside, no overnight events. Patient states he is tolerating clears at this time. Pain is controlled.   Patient admits to flatus, bowel movement yesterday  Patient denies any headaches, chest pain, shortness of breath, nausea, vomiting, fever, chills, weakness, dysuria  Patient A+Ox3 in NAD at time of visit.    OBJECTIVE:   T(C): 36.8 (08-10-20 @ 04:38), Max: 36.8 (08-09-20 @ 12:36)  HR: 65 (08-10-20 @ 04:38) (65 - 80)  BP: 120/80 (08-10-20 @ 04:38) (117/76 - 136/88)  RR: 16 (08-10-20 @ 04:38) (16 - 18)  SpO2: 96% (08-10-20 @ 04:38) (95% - 97%)  Wt(kg): --  CAPILLARY BLOOD GLUCOSE        I&O's Detail    09 Aug 2020 07:01  -  10 Aug 2020 07:00  --------------------------------------------------------  IN:    Oral Fluid: 600 mL    sodium chloride 0.9%.: 420 mL  Total IN: 1020 mL    OUT:    Voided: 2350 mL  Total OUT: 2350 mL    Total NET: -1330 mL      10 Aug 2020 07:01  -  10 Aug 2020 08:18  --------------------------------------------------------  IN:  Total IN: 0 mL    OUT:    Voided: 800 mL  Total OUT: 800 mL    Total NET: -800 mL          Physical exam:  General: A+O x 3 in NAD  HEENT: PERRLA, EOM intact  Neck: trachea midline  Abdomen: soft mildly distended, non tympanic, BS present.   Incision: intact, no erythema noted  Extremities: no edema noted, warm,  no calf tenderness     MEDICATIONS  (STANDING):  acetaminophen   Tablet .. 1000 milliGRAM(s) Oral every 6 hours  amLODIPine   Tablet 5 milliGRAM(s) Oral daily  atorvastatin 40 milliGRAM(s) Oral at bedtime  entecavir 0.5 milliGRAM(s) Oral daily  heparin   Injectable 5000 Unit(s) SubCutaneous every 8 hours  pantoprazole    Tablet 40 milliGRAM(s) Oral before breakfast  piperacillin/tazobactam IVPB.. 3.375 Gram(s) IV Intermittent every 8 hours  senna 2 Tablet(s) Oral at bedtime  sodium chloride 0.9%. 1000 milliLiter(s) (35 mL/Hr) IV Continuous <Continuous>  valsartan 160 milliGRAM(s) Oral daily    MEDICATIONS  (PRN):  guaiFENesin  milliGRAM(s) Oral every 12 hours PRN Cough/phlegm  ketorolac   Injectable 30 milliGRAM(s) IV Push every 6 hours PRN Mild Pain (1 - 3)  oxyCODONE    IR 5 milliGRAM(s) Oral every 4 hours PRN Moderate Pain (4 - 6)  oxyCODONE    IR 10 milliGRAM(s) Oral every 4 hours PRN Severe Pain (7 - 10)      LABS:                        10.8   6.08  )-----------( 391      ( 10 Aug 2020 06:28 )             32.7     08-10    144  |  110<H>  |  8   ----------------------------<  96  3.4<L>   |  28  |  0.87    Ca    8.2<L>      10 Aug 2020 06:28            RADIOLOGY & ADDITIONAL STUDIES:    Assessment  Patient is a 45y old  Male s/p R hemicolectomy/appendectomy for ascending colon mass POD 3, tolerating clears      Plan  - advance to fulls diet, advance as tolerated  - follow up GI function  - f/u pathology  - f/u ID plan for IV abx   - will discuss case with Dr. Schafer    Surgical Team Contact Information SURGERY      SUBJECTIVE:   Patient seen at bedside, no overnight events. Patient states he is tolerating clears at this time. Pain is controlled.   Patient admits to flatus, bowel movement yesterday  Patient denies any headaches, chest pain, shortness of breath, nausea, vomiting, fever, chills, weakness, dysuria  Patient A+Ox3 in NAD at time of visit.    OBJECTIVE:   T(C): 36.8 (08-10-20 @ 04:38), Max: 36.8 (08-09-20 @ 12:36)  HR: 65 (08-10-20 @ 04:38) (65 - 80)  BP: 120/80 (08-10-20 @ 04:38) (117/76 - 136/88)  RR: 16 (08-10-20 @ 04:38) (16 - 18)  SpO2: 96% (08-10-20 @ 04:38) (95% - 97%)  Wt(kg): --  CAPILLARY BLOOD GLUCOSE        I&O's Detail    09 Aug 2020 07:01  -  10 Aug 2020 07:00  --------------------------------------------------------  IN:    Oral Fluid: 600 mL    sodium chloride 0.9%.: 420 mL  Total IN: 1020 mL    OUT:    Voided: 2350 mL  Total OUT: 2350 mL    Total NET: -1330 mL      10 Aug 2020 07:01  -  10 Aug 2020 08:18  --------------------------------------------------------  IN:  Total IN: 0 mL    OUT:    Voided: 800 mL  Total OUT: 800 mL    Total NET: -800 mL          Physical exam:  General: A+O x 3 in NAD  HEENT: PERRLA, EOM intact  Neck: trachea midline  Abdomen: soft mildly distended, non tympanic, BS present.   Incision: intact, no erythema noted  Extremities: no edema noted, warm,  no calf tenderness     MEDICATIONS  (STANDING):  acetaminophen   Tablet .. 1000 milliGRAM(s) Oral every 6 hours  amLODIPine   Tablet 5 milliGRAM(s) Oral daily  atorvastatin 40 milliGRAM(s) Oral at bedtime  entecavir 0.5 milliGRAM(s) Oral daily  heparin   Injectable 5000 Unit(s) SubCutaneous every 8 hours  pantoprazole    Tablet 40 milliGRAM(s) Oral before breakfast  piperacillin/tazobactam IVPB.. 3.375 Gram(s) IV Intermittent every 8 hours  senna 2 Tablet(s) Oral at bedtime  sodium chloride 0.9%. 1000 milliLiter(s) (35 mL/Hr) IV Continuous <Continuous>  valsartan 160 milliGRAM(s) Oral daily    MEDICATIONS  (PRN):  guaiFENesin  milliGRAM(s) Oral every 12 hours PRN Cough/phlegm  ketorolac   Injectable 30 milliGRAM(s) IV Push every 6 hours PRN Mild Pain (1 - 3)  oxyCODONE    IR 5 milliGRAM(s) Oral every 4 hours PRN Moderate Pain (4 - 6)  oxyCODONE    IR 10 milliGRAM(s) Oral every 4 hours PRN Severe Pain (7 - 10)      LABS:                        10.8   6.08  )-----------( 391      ( 10 Aug 2020 06:28 )             32.7     08-10    144  |  110<H>  |  8   ----------------------------<  96  3.4<L>   |  28  |  0.87    Ca    8.2<L>      10 Aug 2020 06:28            RADIOLOGY & ADDITIONAL STUDIES:    Assessment  Patient is a 45y old  Male s/p R hemicolectomy/appendectomy for ascending colon mass POD 3, tolerating clears      Plan  - advance to fulls diet, advance as tolerated  - follow up GI function  - f/u pathology  - f/u ID plan for IV abx   - d/c IVF   - will discuss case with Dr. Schafer    Surgical Team Contact Information SURGERY      SUBJECTIVE:   Patient seen at bedside, no overnight events. Patient states he is tolerating clears at this time. Pain is controlled.   Patient admits to flatus, bowel movement yesterday  Patient denies any headaches, chest pain, shortness of breath, nausea, vomiting, fever, chills, weakness, dysuria  Patient A+Ox3 in NAD at time of visit.      OBJECTIVE:   T(C): 36.8 (08-10-20 @ 04:38), Max: 36.8 (08-09-20 @ 12:36)  HR: 65 (08-10-20 @ 04:38) (65 - 80)  BP: 120/80 (08-10-20 @ 04:38) (117/76 - 136/88)  RR: 16 (08-10-20 @ 04:38) (16 - 18)  SpO2: 96% (08-10-20 @ 04:38) (95% - 97%)      I&O's Detail    09 Aug 2020 07:01  -  10 Aug 2020 07:00  --------------------------------------------------------  IN:    Oral Fluid: 600 mL    sodium chloride 0.9%.: 420 mL  Total IN: 1020 mL    OUT:    Voided: 2350 mL  Total OUT: 2350 mL    Total NET: -1330 mL      10 Aug 2020 07:01  -  10 Aug 2020 08:18  --------------------------------------------------------  IN:  Total IN: 0 mL    OUT:    Voided: 800 mL  Total OUT: 800 mL    Total NET: -800 mL      Physical exam:  General: A+O x 3 in NAD  HEENT: PERRLA, EOM intact  Neck: trachea midline  Abdomen: soft mildly distended, non tympanic, BS present.   Incision: intact, no erythema noted  Extremities: no edema noted, warm,  no calf tenderness       MEDICATIONS  (STANDING):  acetaminophen   Tablet .. 1000 milliGRAM(s) Oral every 6 hours  amLODIPine   Tablet 5 milliGRAM(s) Oral daily  atorvastatin 40 milliGRAM(s) Oral at bedtime  entecavir 0.5 milliGRAM(s) Oral daily  heparin   Injectable 5000 Unit(s) SubCutaneous every 8 hours  pantoprazole    Tablet 40 milliGRAM(s) Oral before breakfast  piperacillin/tazobactam IVPB.. 3.375 Gram(s) IV Intermittent every 8 hours  senna 2 Tablet(s) Oral at bedtime  sodium chloride 0.9%. 1000 milliLiter(s) (35 mL/Hr) IV Continuous <Continuous>  valsartan 160 milliGRAM(s) Oral daily      MEDICATIONS  (PRN):  guaiFENesin  milliGRAM(s) Oral every 12 hours PRN Cough/phlegm  ketorolac   Injectable 30 milliGRAM(s) IV Push every 6 hours PRN Mild Pain (1 - 3)  oxyCODONE    IR 5 milliGRAM(s) Oral every 4 hours PRN Moderate Pain (4 - 6)  oxyCODONE    IR 10 milliGRAM(s) Oral every 4 hours PRN Severe Pain (7 - 10)      LABS:                        10.8   6.08  )-----------( 391      ( 10 Aug 2020 06:28 )             32.7     08-10    144  |  110<H>  |  8   ----------------------------<  96  3.4<L>   |  28  |  0.87    Ca    8.2<L>      10 Aug 2020 06:28      RADIOLOGY & ADDITIONAL STUDIES: No new radiology reports or studies; PATHOLOGY IS PENDING.      Assessment  Patient is a 45y old  Male s/p R hemicolectomy/appendectomy for ascending colon mass POD 3, tolerating clears      Plan  - advance to fulls diet, advance as tolerated  - follow up GI function  - f/u pathology  - f/u ID plan for IV abx   - d/c IVF   - will discuss case with Dr. Schafer      Surgical Team Contact Information

## 2020-08-10 NOTE — PROGRESS NOTE ADULT - ASSESSMENT
All as above in PA notation.  Personally seen and examined.  Vitals non-suggestive.  Abdomen benign.  Wound clean.  Labs reassuring.  As such, continue full liquids today.  Needs another day of IV abx per ID.  As such, low residue/ fiber tomorrow with conversion to oral abx.  Awaiting Pathology.  However, if clinically appropriate, no contraindication to discharge tomorrow pm and outpatient follow-up for General Surgery care and receipt/ review of Pathology.  Patient pleased, agrees and remains in good spirits.

## 2020-08-10 NOTE — PROGRESS NOTE ADULT - SUBJECTIVE AND OBJECTIVE BOX
Patient is a 45y old  Male who presents with a chief complaint of Possible Colitis (10 Aug 2020 11:09)      HPI:  Mr Mahmood is a 46 yo M who presented to [facility] ED with RLQ pain found to have ascending colon thickening concerning for colitis or neoplasm. PMH Hepatitis B, HTN, HLD.  Pt seen and examined at bedside. He reports 6/10 RLQ pain which began at 2AM. Pain does not radiate elsewhere, he denies any injury to the area, he has decreased PO intake. He also reports Temp of 100.1F at home. Denies any associated chills. Denies headaches, nausea, vomiting, chest pain, SOB, palpitations, constipation, diarrhea, melena, hematochezia, dysuria. Last bowel movement was this morning and reportedly well formed and non-bloody. He has never had a colonoscopy. Denies family hx of colon cancer.   He is on Entecavir for a history of Hepatitis B and continues to take this medication. \par Patient has a different MRN # at [facility] and labs and imaging can be found under MRN # [id number].   Noted to have Leukocytosis of 18k. Cr clearance: 89.2  CT scan pasted below:   admitted with rt lower quadrant pain   with colitis , colon mas  post biopsy -  POD3   s/p lap R hemicolectomy, appendectomy  pt seen and examine today-   pt feeling better  clear liquid diet tolerating well  need to  advance  diet   , no abd pain  , no distress .       INTERVAL HPI/OVERNIGHT EVENTS:  T(C): 36.7 (08-10-20 @ 11:52), Max: 36.8 (08-09-20 @ 21:45)  HR: 65 (08-10-20 @ 11:52) (65 - 75)  BP: 114/78 (08-10-20 @ 11:52) (114/78 - 136/88)  RR: 18 (08-10-20 @ 11:52) (16 - 18)  SpO2: 99% (08-10-20 @ 11:52) (95% - 99%)  Wt(kg): --  I&O's Summary    09 Aug 2020 07:01  -  10 Aug 2020 07:00  --------------------------------------------------------  IN: 1020 mL / OUT: 2350 mL / NET: -1330 mL    10 Aug 2020 07:01  -  10 Aug 2020 13:25  --------------------------------------------------------  IN: 0 mL / OUT: 800 mL / NET: -800 mL      REVIEW OF SYSTEMS:  CONSTITUTIONAL: No fever, weight loss, or fatigue  EYES: No eye pain, visual disturbances, or discharge  ENMT:No sinus or throat pain  NECK: No pain or stiffness  RESPIRATORY: No cough, wheezing, chills  , No shortness of breath  CARDIOVASCULAR: No chest pain, palpitations, dizziness, or leg swelling  GASTROINTESTINAL: No abdominal or epigastric pain. No nausea, vomiting, No diarrhea or constipation.  GENITOURINARY: No dysuria, frequency, hematuria, or incontinence  NEUROLOGICAL: No headaches, memory loss, loss of strength, numbness, or tremors  SKIN: No itching, burning, rashes, or lesions   MUSCULOSKELETAL: No joint pain or swelling; No muscle, back, or extremity pain      PHYSICAL EXAM:  GENERAL: NAD, well-groomed, well-developed  HEAD:  Atraumatic, Normocephalic  EYES: EOMI, PERRLA, conjunctiva and sclera clear  ENMT:; Moist mucous membranes,   NECK: Supple, No JVD,   NERVOUS SYSTEM:  Alert & Oriented X3, Motor Strength 5/5 B/L upper and lower extremities; DTRs 2+ intact and symmetric  CHEST/LUNG:  percussion bilaterally; No rales, rhonchi, wheezing,   HEART: Regular rate and rhythm; No murmurs,  no tachy   ABDOMEN: Soft, , Nondistended; Bowel sounds present   slow , surgical site dry clean  stiches  +  EXTREMITIES:  2+ Peripheral Pulses, No clubbing, cyanosis, or edema  SKIN: No rashes or lesions  gu intact           MEDICATIONS  (STANDING):  acetaminophen   Tablet .. 1000 milliGRAM(s) Oral every 6 hours  amLODIPine   Tablet 5 milliGRAM(s) Oral daily  atorvastatin 40 milliGRAM(s) Oral at bedtime  entecavir 0.5 milliGRAM(s) Oral daily  heparin   Injectable 5000 Unit(s) SubCutaneous every 8 hours  pantoprazole    Tablet 40 milliGRAM(s) Oral before breakfast  piperacillin/tazobactam IVPB.. 3.375 Gram(s) IV Intermittent every 8 hours  senna 2 Tablet(s) Oral at bedtime  valsartan 160 milliGRAM(s) Oral daily    MEDICATIONS  (PRN):  guaiFENesin  milliGRAM(s) Oral every 12 hours PRN Cough/phlegm  ketorolac   Injectable 30 milliGRAM(s) IV Push every 6 hours PRN Mild Pain (1 - 3)  oxyCODONE    IR 5 milliGRAM(s) Oral every 4 hours PRN Moderate Pain (4 - 6)  oxyCODONE    IR 10 milliGRAM(s) Oral every 4 hours PRN Severe Pain (7 - 10)      LABS:                        10.8   6.08  )-----------( 391      ( 10 Aug 2020 06:28 )             32.7     08-10    144  |  110<H>  |  8   ----------------------------<  96  3.4<L>   |  28  |  0.87    Ca    8.2<L>      10 Aug 2020 06:28                            RADIOLOGY & ADDITIONAL TESTS:    Imaging Personally Reviewed:   no new test     Advance Directives:    full code   Palliative Care: appropriate

## 2020-08-10 NOTE — PROGRESS NOTE ADULT - PROBLEM SELECTOR PLAN 7
Continue Heparin subcutaneous injection for DVT prophylaxis
patient denies any active CP, SOB. 12 lead EKG reviewed- no signs of concerning ischemic changes. Pt doesa not have a history of CAD, unstable arrythmia, CHF, severe valvular disease, PVD, DM, CVA or CKD. Pt functional tolerance is greater than 4 METS and his RCRI score is 0. Pt is low risk for a moderate risk surgery.  No absolute contraindications for planned right hemicolectomy and reasonable to proceed.

## 2020-08-10 NOTE — PROGRESS NOTE ADULT - PROBLEM SELECTOR PROBLEM 7
Prophylactic measure
Preoperative clearance

## 2020-08-10 NOTE — PROGRESS NOTE ADULT - SUBJECTIVE AND OBJECTIVE BOX
INTERVAL HPI/OVERNIGHT EVENTS:  No new overnight event.  No N/V/D.  Tolerating diet.      Allergies    ibuprofen (Other)  ibuprofen (Rash)    Intolerances          General:  No wt loss, fevers, chills, night sweats, fatigue,   Eyes:  Good vision, no reported pain  ENT:  No sore throat, pain, runny nose, dysphagia  CV:  No pain, palpitations, hypo/hypertension  Resp:  No dyspnea, cough, tachypnea, wheezing  GI:  No pain, No nausea, No vomiting, No diarrhea, No constipation, No weight loss, No fever, No pruritis, No rectal bleeding, No tarry stools, No dysphagia,  :  No pain, bleeding, incontinence, nocturia  Muscle:  No pain, weakness  Neuro:  No weakness, tingling, memory problems  Psych:  No fatigue, insomnia, mood problems, depression  Endocrine:  No polyuria, polydipsia, cold/heat intolerance  Heme:  No petechiae, ecchymosis, easy bruisability  Skin:  No rash, tattoos, scars, edema      PHYSICAL EXAM:   Vital Signs:  Vital Signs Last 24 Hrs  T(C): 36.8 (09 Aug 2020 12:36), Max: 37.2 (08 Aug 2020 21:25)  T(F): 98.2 (09 Aug 2020 12:36), Max: 98.9 (08 Aug 2020 21:25)  HR: 80 (09 Aug 2020 12:36) (71 - 80)  BP: 123/85 (09 Aug 2020 12:36) (117/76 - 123/85)  BP(mean): --  RR: 18 (09 Aug 2020 12:36) (18 - 18)  SpO2: 96% (09 Aug 2020 12:36) (96% - 97%)  Daily     Daily I&O's Summary    08 Aug 2020 07:01  -  09 Aug 2020 07:00  --------------------------------------------------------  IN: 1630 mL / OUT: 1950 mL / NET: -320 mL    09 Aug 2020 07:01  -  09 Aug 2020 18:06  --------------------------------------------------------  IN: 600 mL / OUT: 1400 mL / NET: -800 mL        GENERAL:  Appears stated age, well-groomed, well-nourished, no distress  HEENT:  NC/AT,  conjunctivae clear and pink, no thyromegaly, nodules, adenopathy, no JVD, sclera -anicteric  CHEST:  Full & symmetric excursion, no increased effort, breath sounds clear  HEART:  Regular rhythm, S1, S2, no murmur/rub/S3/S4, no abdominal bruit, no edema  ABDOMEN:  Soft, non-tender, non-distended, normoactive bowel sounds,  no masses ,no hepato-splenomegaly, no signs of chronic liver disease  EXTEREMITIES:  no cyanosis,clubbing or edema  SKIN:  No rash/erythema/ecchymoses/petechiae/wounds/abscess/warm/dry  NEURO:  Alert, oriented, no asterixis, no tremor, no encephalopathy      LABS:                        10.7   7.30  )-----------( 347      ( 09 Aug 2020 07:24 )             33.0     08-09    144  |  113<H>  |  8   ----------------------------<  104<H>  3.8   |  28  |  0.79    Ca    8.1<L>      09 Aug 2020 07:24    TPro  7.1  /  Alb  3.0<L>  /  TBili  0.7  /  DBili  x   /  AST  19  /  ALT  26  /  AlkPhos  64  08-08        amylase   lipase  RADIOLOGY & ADDITIONAL TESTS:

## 2020-08-10 NOTE — PROGRESS NOTE ADULT - ASSESSMENT
44 yo M with a PMHx of Hepatitis B, HTN, HLD presents with RLQ pain secondary to colonic mass and associated colitis- also with CLostridium septicum bacteremia            1. Colonic mass- tubulovillous adenoma  2. Clostridium septicum bacteremia- repeat blood cult neg   3. Personal history of Hepatitic B+ on Entecavir  4. Transmural colitis  5. Essential HTN  6. Leukocytosis- resolved

## 2020-08-10 NOTE — PROGRESS NOTE ADULT - SUBJECTIVE AND OBJECTIVE BOX
[facility]  INFECTIOUS DISEASES   =======================================================    [facility]-[id number]  MARY KIM     Follow up: Bacteremia and colitis     S/P right sided colectomy on 8/7.Doing well, on full liquid diet now, tolerating well. No fever.    PAST MEDICAL & SURGICAL HISTORY:  HTN (hypertension)  No significant past surgical history    Social Hx: No smoking, ETOH or drugs     FAMILY HISTORY: no HBV as per him.     Allergies  ibuprofen (Other)    Antibiotics:  zosyn  entecavir 0.5 milliGRAM(s) Oral daily    REVIEW OF SYSTEMS:  CONSTITUTIONAL:  No Fever or chills  HEENT:  No diplopia or blurred vision.  No sore throat or runny nose.  CARDIOVASCULAR:  No chest pain or SOB.  RESPIRATORY:  No cough, shortness of breath, PND or orthopnea.  GASTROINTESTINAL:  No nausea, vomiting or diarrhea.  GENITOURINARY:  No dysuria, frequency or urgency. No Blood in urine  MUSCULOSKELETAL:  no joint aches, no muscle pain  SKIN:  No change in skin, hair or nails.  NEUROLOGIC:  No paresthesias, fasciculations, seizures or weakness.  PSYCHIATRIC:  No disorder of thought or mood.  ENDOCRINE:  No heat or cold intolerance, polyuria or polydipsia.  HEMATOLOGICAL:  No easy bruising or bleeding.     Physical Exam:  Vital Signs Last 24 Hrs  T(C): 36.8 (10 Aug 2020 04:38), Max: 36.8 (09 Aug 2020 12:36)  T(F): 98.2 (10 Aug 2020 04:38), Max: 98.3 (09 Aug 2020 21:45)  HR: 65 (10 Aug 2020 04:38) (65 - 80)  BP: 120/80 (10 Aug 2020 04:38) (117/76 - 136/88)  BP(mean): --  RR: 16 (10 Aug 2020 04:38) (16 - 18)  SpO2: 96% (10 Aug 2020 04:38) (95% - 97%)  GEN: NAD, obese  HEENT: normocephalic and atraumatic. EOMI. PERRL.    NECK: Supple.  No lymphadenopathy   LUNGS: Clear to auscultation.  HEART: Regular rate and rhythm without murmur.  ABDOMEN: Soft, mild tenderness, surgical wounds are clean, healing. No sign of infection   : No CVA tenderness  EXTREMITIES: Without any cyanosis, clubbing, rash, lesions or edema.  NEUROLOGIC: grossly intact.  PSYCHIATRIC: Appropriate affect .  SKIN: No ulceration or induration present.    Labs:                        10.8   6.08  )-----------( 391      ( 10 Aug 2020 06:28 )             32.7      08-10    144  |  110<H>  |  8   ----------------------------<  96  3.4<L>   |  28  |  0.87    Ca    8.2<L>      10 Aug 2020 06:28    GI PCR Panel, Stool (collected 08-04-20 @ 02:37)  Source: .Stool Feces  Final Report (08-04-20 @ 06:21):    GI PCR Results: NOT detected    *******Please Note:*******    GI panel PCR evaluates for:    Campylobacter, Plesiomonas shigelloides, Salmonella,    Vibrio, Yersinia enterocolitica, Enteroaggregative    Escherichia coli (EAEC), Enteropathogenic E.coli (EPEC),    Enterotoxigenic E. coli (ETEC) lt/st, Shiga-like    toxin-producing E. coli (STEC) stx1/stx2,    Shigella/ Enteroinvasive E. coli (EIEC), Cryptosporidium,    Cyclospora cayetanensis, Entamoeba histolytica,    Giardia lamblia, Adenovirus F 40/41, Astrovirus,    Norovirus GI/GII, Rotavirus A, Sapovirus    Culture - Blood (collected 08-03-20 @ 18:56)  Source: .Blood Blood-Venous  Final Report (08-08-20 @ 19:00):    No Growth Final    Culture - Blood (collected 08-03-20 @ 18:56)  Source: .Blood Blood  Final Report (08-08-20 @ 19:00):    No Growth Final    Culture - Blood (collected 08-02-20 @ 21:12)  Source: .Blood Blood  Final Report (08-07-20 @ 22:01):    No Growth Final    Culture - Blood (collected 08-02-20 @ 21:12)  Source: .Blood Blood  Gram Stain (08-03-20 @ 11:05):    Growth in anaerobic bottle: Gram Variable Rods  Final Report (08-04-20 @ 11:21):    Growth in anaerobic bottle: Clostridium septicum "Susceptibilities not    performed"    "Due to technical problems, Proteus sp. will Not be reported as part of    the BCID panel until further notice"    ***Blood Panel PCR results on this specimen are available    approximately 3 hours after the Gram stain result.***    Gram stain, PCR, and/or culture results may not always    correspond due to difference in methodologies.    ************************************************************    This PCR assay was performed using [employer].    The following targets are tested for: Enterococcus,    vancomycin resistant enterococci, Listeria monocytogenes,    coagulase negative staphylococci, S. aureus,    methicillin resistant S. aureus, Streptococcus agalactiae    (Group B), S. pneumoniae, S. pyogenes (Group A),    Acinetobacter baumannii, Enterobacter cloacae, E. coli,    Klebsiella oxytoca, K. pneumoniae, Proteus sp.,    Serratia marcescens, Haemophilus influenzae,    Neisseria meningitidis, Pseudomonas aeruginosa, Candida    albicans, C. glabrata, C krusei, C parapsilosis,    C. tropicalis and the KPC resistance gene.  Organism: Blood Culture PCR (08-04-20 @ 11:21)  Organism: Blood Culture PCR (08-04-20 @ 11:21)    Sensitivities:      -  Acinetobacter baumanii: Nondet      -  Candida albicans: Nondet      -  Candida glabrata: Nondet      -  Candida krusei: Nondet      -  Candida parapsilosis: Nondet      -  Candida tropicalis: Nondet      -  Coagulase negative Staphylococcus: Nondet      -  Enterobacter cloacae complex: Nondet      -  Enterococcus species: Nondet      -  Escherichia coli: Nondet      -  Haemophilus influenzae: Nondet      -  Klebsiella oxytoca: Nondet      -  Klebsiella pneumoniae: Nondet      -  Listeria monocytogenes: Nondet      -  Methicillin resistant Staphylococcus aureus (MRSA): Nondet      -  Multidrug (KPC pos) resistant organism: Nondet      -  Neisseria meningitidis: Nondet      -  Pseudomonas aeruginosa: Nondet      -  Serratia marcescens: Nondet      -  Staphylococcus aureus: Nondet      -  Streptococcus agalactiae (Group B): Nondet      -  Streptococcus pneumoniae: Nondet      -  Streptococcus pyogenes (Group A): Nondet      -  Streptococcus sp. (Not Grp A, B or S pneumoniae): Nondet      -  Vancomycin resistant Enterococcus sp.: Nondet      Method Type: PCR    Culture - Urine (collected 08-02-20 @ 20:45)  Source: .Urine Clean Catch (Midstream)  Final Report (08-03-20 @ 15:48):    <10,000 CFU/mL Normal Urogenital Mary    WBC Count: 6.08 K/uL (08-10-20 @ 06:28)  WBC Count: 7.30 K/uL (08-09-20 @ 07:24)  WBC Count: 9.06 K/uL (08-08-20 @ 06:15)  WBC Count: 6.46 K/uL (08-07-20 @ 07:10)  WBC Count: 6.19 K/uL (08-06-20 @ 09:30)    Creatinine, Serum: 0.87 mg/dL (08-10-20 @ 06:28)  Creatinine, Serum: 0.79 mg/dL (08-09-20 @ 07:24)  Creatinine, Serum: 0.77 mg/dL (08-08-20 @ 06:15)  Creatinine, Serum: 0.84 mg/dL (08-07-20 @ 07:10)  Creatinine, Serum: 0.86 mg/dL (08-06-20 @ 09:30)     COVID-19 PCR: NotDetec (08-02-20 @ 16:48)    All imaging and other data have been reviewed.  Abdominal CT 8/2:   LOWER CHEST: Within normal limits.  LIVER: Steatosis.  BILE DUCTS: Normal caliber.  GALLBLADDER: Within normal limits.  SPLEEN: Within normal limits.  PANCREAS: Within normal limits.  ADRENALS: Within normal limits.  KIDNEYS/URETERS: Within normal limits.  BLADDER: Within normal limits.  REPRODUCTIVE ORGANS: Prostate within normal limits.  BOWEL/LYMPH NODES: Circumferential mural thickening of the ascending colon with extension into the terminal ileum. Surrounding inflammatory changes. Enlarged mesenteric lymph nodes in the right lower quadrant, measuring up to 2.0 x 1.5 cm. Reflux of contrast into the distal esophagus. No bowel obstruction. Appendix is not visualized.  PERITONEUM: No ascites.  VESSELS: Within normal limits.  ABDOMINAL WALL: Within normal limits.  BONES: Degenerative changes.  IMPRESSION:  Circumferential mural thickening of the ascending colon with adjacent enlarged mesenteric lymph nodes. Primary consideration is neoplasm. Colitis is considered less likely. (02 Aug 2020 17:22)    Assessment and Plan:   46 y/o man with PMH of HTN and hepatitis B was admitted on 8/2 with abdominal pain mostly in R mid and lower part of abdomen started the same day. He doesn't have any nausea, vomiting, diarrhea or constipation. Tolerating clear liquid very well. Abdominal CT showed "Circumferential mural thickening of the ascending colon with extension into the terminal ileum" so he was started on cipro+flagyl for colitis. Labs done Alkol (MRN # [id number]).  For HBV takes entecavir for 9 years and goes for surveillance [facility] annually except for this year due to COVID.  Never had colonoscopy.   Clostridium septicum bacteremia is associated with colon cancer.   UA normal.  WBC 18k with left shift    Clostridium septicum bacteremia, and colon mass:  - Blood culture with clostridium septicum on 8/2  - Repeat blood culture 8/3 NGTD  - Colonoscopy showed an obstructive ulcerated mass in ascending colon, now with Tubulovillous adenoma in pathology.   - S/P partial colectomy on 8/7, will follow pathology and follow up with GI outpt.   - Continue Zosyn 3.375gm q8h   - Trend WBC, 18k-->6k  - Since source has been removed and clinically better, can stop antibiotics 5days post-op, if plan for discharge can switch to cipro+flagyl.     HBV:  - Continue Entecavir   - LFTs are normal, TB mild elevation  - HBV VL PCR pending    Will follow.    Irma Grant MD  Division of Infectious Diseases   [phone]

## 2020-08-11 ENCOUNTER — TRANSCRIPTION ENCOUNTER (OUTPATIENT)
Age: 45
End: 2020-08-11

## 2020-08-11 VITALS
HEART RATE: 63 BPM | OXYGEN SATURATION: 97 % | RESPIRATION RATE: 16 BRPM | DIASTOLIC BLOOD PRESSURE: 76 MMHG | TEMPERATURE: 98 F | SYSTOLIC BLOOD PRESSURE: 119 MMHG

## 2020-08-11 LAB
ANION GAP SERPL CALC-SCNC: 6 MMOL/L — SIGNIFICANT CHANGE UP (ref 5–17)
BUN SERPL-MCNC: 9 MG/DL — SIGNIFICANT CHANGE UP (ref 7–23)
CALCIUM SERPL-MCNC: 8.7 MG/DL — SIGNIFICANT CHANGE UP (ref 8.5–10.1)
CHLORIDE SERPL-SCNC: 108 MMOL/L — SIGNIFICANT CHANGE UP (ref 96–108)
CO2 SERPL-SCNC: 29 MMOL/L — SIGNIFICANT CHANGE UP (ref 22–31)
CREAT SERPL-MCNC: 0.77 MG/DL — SIGNIFICANT CHANGE UP (ref 0.5–1.3)
GLUCOSE SERPL-MCNC: 89 MG/DL — SIGNIFICANT CHANGE UP (ref 70–99)
HBV DNA # SERPL NAA+PROBE: SIGNIFICANT CHANGE UP
HBV DNA SERPL NAA+PROBE-LOG#: SIGNIFICANT CHANGE UP LOG10IU/ML
HCT VFR BLD CALC: 33.2 % — LOW (ref 39–50)
HGB BLD-MCNC: 10.8 G/DL — LOW (ref 13–17)
MAGNESIUM SERPL-MCNC: 2.7 MG/DL — HIGH (ref 1.6–2.6)
MCHC RBC-ENTMCNC: 27.4 PG — SIGNIFICANT CHANGE UP (ref 27–34)
MCHC RBC-ENTMCNC: 32.5 GM/DL — SIGNIFICANT CHANGE UP (ref 32–36)
MCV RBC AUTO: 84.3 FL — SIGNIFICANT CHANGE UP (ref 80–100)
NRBC # BLD: 0 /100 WBCS — SIGNIFICANT CHANGE UP (ref 0–0)
PHOSPHATE SERPL-MCNC: 3.9 MG/DL — SIGNIFICANT CHANGE UP (ref 2.5–4.5)
PLATELET # BLD AUTO: 443 K/UL — HIGH (ref 150–400)
POTASSIUM SERPL-MCNC: 3.9 MMOL/L — SIGNIFICANT CHANGE UP (ref 3.5–5.3)
POTASSIUM SERPL-SCNC: 3.9 MMOL/L — SIGNIFICANT CHANGE UP (ref 3.5–5.3)
RBC # BLD: 3.94 M/UL — LOW (ref 4.2–5.8)
RBC # FLD: 12.6 % — SIGNIFICANT CHANGE UP (ref 10.3–14.5)
SODIUM SERPL-SCNC: 143 MMOL/L — SIGNIFICANT CHANGE UP (ref 135–145)
WBC # BLD: 6.6 K/UL — SIGNIFICANT CHANGE UP (ref 3.8–10.5)
WBC # FLD AUTO: 6.6 K/UL — SIGNIFICANT CHANGE UP (ref 3.8–10.5)

## 2020-08-11 PROCEDURE — 86900 BLOOD TYPING SEROLOGIC ABO: CPT

## 2020-08-11 PROCEDURE — 87517 HEPATITIS B DNA QUANT: CPT

## 2020-08-11 PROCEDURE — 80053 COMPREHEN METABOLIC PANEL: CPT

## 2020-08-11 PROCEDURE — 87507 IADNA-DNA/RNA PROBE TQ 12-25: CPT

## 2020-08-11 PROCEDURE — 85610 PROTHROMBIN TIME: CPT

## 2020-08-11 PROCEDURE — 99285 EMERGENCY DEPT VISIT HI MDM: CPT

## 2020-08-11 PROCEDURE — 88307 TISSUE EXAM BY PATHOLOGIST: CPT

## 2020-08-11 PROCEDURE — 80048 BASIC METABOLIC PNL TOTAL CA: CPT

## 2020-08-11 PROCEDURE — 83735 ASSAY OF MAGNESIUM: CPT

## 2020-08-11 PROCEDURE — 84100 ASSAY OF PHOSPHORUS: CPT

## 2020-08-11 PROCEDURE — 82378 CARCINOEMBRYONIC ANTIGEN: CPT

## 2020-08-11 PROCEDURE — 87635 SARS-COV-2 COVID-19 AMP PRB: CPT

## 2020-08-11 PROCEDURE — 85027 COMPLETE CBC AUTOMATED: CPT

## 2020-08-11 PROCEDURE — 99239 HOSP IP/OBS DSCHRG MGMT >30: CPT | Mod: GC

## 2020-08-11 PROCEDURE — 86850 RBC ANTIBODY SCREEN: CPT

## 2020-08-11 PROCEDURE — 93005 ELECTROCARDIOGRAM TRACING: CPT

## 2020-08-11 PROCEDURE — C1889: CPT

## 2020-08-11 PROCEDURE — 99232 SBSQ HOSP IP/OBS MODERATE 35: CPT

## 2020-08-11 PROCEDURE — 86901 BLOOD TYPING SEROLOGIC RH(D): CPT

## 2020-08-11 PROCEDURE — 36415 COLL VENOUS BLD VENIPUNCTURE: CPT

## 2020-08-11 PROCEDURE — 87040 BLOOD CULTURE FOR BACTERIA: CPT

## 2020-08-11 PROCEDURE — 85730 THROMBOPLASTIN TIME PARTIAL: CPT

## 2020-08-11 PROCEDURE — 86769 SARS-COV-2 COVID-19 ANTIBODY: CPT

## 2020-08-11 PROCEDURE — 88305 TISSUE EXAM BY PATHOLOGIST: CPT

## 2020-08-11 RX ORDER — PANTOPRAZOLE SODIUM 20 MG/1
1 TABLET, DELAYED RELEASE ORAL
Qty: 15 | Refills: 0
Start: 2020-08-11 | End: 2020-08-25

## 2020-08-11 RX ORDER — AMLODIPINE BESYLATE 2.5 MG/1
1 TABLET ORAL
Qty: 0 | Refills: 0 | DISCHARGE

## 2020-08-11 RX ORDER — SENNA PLUS 8.6 MG/1
2 TABLET ORAL
Qty: 10 | Refills: 0
Start: 2020-08-11 | End: 2020-08-15

## 2020-08-11 RX ADMIN — AMLODIPINE BESYLATE 5 MILLIGRAM(S): 2.5 TABLET ORAL at 05:34

## 2020-08-11 RX ADMIN — Medication 1000 MILLIGRAM(S): at 05:35

## 2020-08-11 RX ADMIN — HEPARIN SODIUM 5000 UNIT(S): 5000 INJECTION INTRAVENOUS; SUBCUTANEOUS at 05:35

## 2020-08-11 RX ADMIN — VALSARTAN 160 MILLIGRAM(S): 80 TABLET ORAL at 05:34

## 2020-08-11 RX ADMIN — PIPERACILLIN AND TAZOBACTAM 25 GRAM(S): 4; .5 INJECTION, POWDER, LYOPHILIZED, FOR SOLUTION INTRAVENOUS at 05:35

## 2020-08-11 RX ADMIN — ENTECAVIR 0.5 MILLIGRAM(S): 0.5 TABLET ORAL at 12:07

## 2020-08-11 RX ADMIN — PANTOPRAZOLE SODIUM 40 MILLIGRAM(S): 20 TABLET, DELAYED RELEASE ORAL at 05:34

## 2020-08-11 NOTE — DISCHARGE NOTE PROVIDER - CARE PROVIDER_API CALL
Harpreet Schafer J  SURGERY  [address] [place] [address]  Phone: [phone]  Fax: [phone]  Follow Up Time:

## 2020-08-11 NOTE — PROGRESS NOTE ADULT - SUBJECTIVE AND OBJECTIVE BOX
[facility]  INFECTIOUS DISEASES   =======================================================    [facility]-[id number]  MARY KIM     Follow up: Bacteremia and colitis     S/P right sided colectomy on 8/7. Doing well, on low residue diet, tolerating well. No fever.  Repeat culture neg.     PAST MEDICAL & SURGICAL HISTORY:  HTN (hypertension)  No significant past surgical history    Social Hx: No smoking, ETOH or drugs     FAMILY HISTORY: no HBV as per him.     Allergies  ibuprofen (Other)    Antibiotics:  zosyn  entecavir 0.5 milliGRAM(s) Oral daily    REVIEW OF SYSTEMS:  CONSTITUTIONAL:  No Fever or chills  HEENT:  No diplopia or blurred vision.  No sore throat or runny nose.  CARDIOVASCULAR:  No chest pain or SOB.  RESPIRATORY:  No cough, shortness of breath, PND or orthopnea.  GASTROINTESTINAL:  No nausea, vomiting or diarrhea.  GENITOURINARY:  No dysuria, frequency or urgency. No Blood in urine  MUSCULOSKELETAL:  no joint aches, no muscle pain  SKIN:  No change in skin, hair or nails.  NEUROLOGIC:  No paresthesias, fasciculations, seizures or weakness.  PSYCHIATRIC:  No disorder of thought or mood.  ENDOCRINE:  No heat or cold intolerance, polyuria or polydipsia.  HEMATOLOGICAL:  No easy bruising or bleeding.     Physical Exam:  Vital Signs Last 24 Hrs  T(C): 36.8 (11 Aug 2020 04:45), Max: 36.8 (11 Aug 2020 04:45)  T(F): 98.2 (11 Aug 2020 04:45), Max: 98.2 (11 Aug 2020 04:45)  HR: 63 (11 Aug 2020 04:45) (63 - 65)  BP: 119/76 (11 Aug 2020 04:45) (114/78 - 129/84)  BP(mean): --  RR: 16 (11 Aug 2020 04:45) (16 - 18)  SpO2: 97% (11 Aug 2020 04:45) (97% - 99%)  GEN: NAD, obese  HEENT: normocephalic and atraumatic. EOMI. PERRL.    NECK: Supple.  No lymphadenopathy   LUNGS: Clear to auscultation.  HEART: Regular rate and rhythm without murmur.  ABDOMEN: Soft, mild tenderness, surgical wounds are clean, healing. No sign of infection   : No CVA tenderness  EXTREMITIES: Without any cyanosis, clubbing, rash, lesions or edema.  NEUROLOGIC: grossly intact.  PSYCHIATRIC: Appropriate affect .  SKIN: No ulceration or induration present.    Labs:  08-11    143  |  108  |  9   ----------------------------<  89  3.9   |  29  |  0.77    Ca    8.7      11 Aug 2020 07:31  Phos  3.9     08-11  Mg     2.7     08-11                        10.8   6.60  )-----------( 443      ( 11 Aug 2020 07:31 )             33.2     RECENT CULTURES:  08-04 @ 02:37 .Stool Feces     GI PCR Results: NOT detected    08-03 @ 18:56 .Blood Blood     No Growth Final    08-02 @ 21:12 .Blood Blood Blood Culture PCR    Growth in anaerobic bottle: Clostridium septicum "Susceptibilities not  Growth in anaerobic bottle: Gram Variable Rods    08-02 @ 20:45 .Urine Clean Catch (Midstream)     <10,000 CFU/mL Normal Urogenital Mary      All imaging and other data have been reviewed.  Abdominal CT 8/2:   LOWER CHEST: Within normal limits.  LIVER: Steatosis.  BILE DUCTS: Normal caliber.  GALLBLADDER: Within normal limits.  SPLEEN: Within normal limits.  PANCREAS: Within normal limits.  ADRENALS: Within normal limits.  KIDNEYS/URETERS: Within normal limits.  BLADDER: Within normal limits.  REPRODUCTIVE ORGANS: Prostate within normal limits.  BOWEL/LYMPH NODES: Circumferential mural thickening of the ascending colon with extension into the terminal ileum. Surrounding inflammatory changes. Enlarged mesenteric lymph nodes in the right lower quadrant, measuring up to 2.0 x 1.5 cm. Reflux of contrast into the distal esophagus. No bowel obstruction. Appendix is not visualized.  PERITONEUM: No ascites.  VESSELS: Within normal limits.  ABDOMINAL WALL: Within normal limits.  BONES: Degenerative changes.  IMPRESSION:  Circumferential mural thickening of the ascending colon with adjacent enlarged mesenteric lymph nodes. Primary consideration is neoplasm. Colitis is considered less likely. (02 Aug 2020 17:22)    Assessment and Plan:   44 y/o man with PMH of HTN and hepatitis B was admitted on 8/2 with abdominal pain mostly in R mid and lower part of abdomen started the same day. He doesn't have any nausea, vomiting, diarrhea or constipation. Tolerating clear liquid very well. Abdominal CT showed "Circumferential mural thickening of the ascending colon with extension into the terminal ileum" so he was started on cipro+flagyl for colitis. Labs done [facility] (MRN # [id number]).  For HBV takes entecavir for 9 years and goes for surveillance [facility] annually except for this year due to COVID.  Never had colonoscopy.   Clostridium septicum bacteremia is associated with colon cancer.   UA normal.  WBC 18k with left shift    Clostridium septicum bacteremia, and colon mass:  - Blood culture with clostridium septicum on 8/2  - Repeat blood culture 8/3 NGTD  - Colonoscopy showed an obstructive ulcerated mass in ascending colon, now with Tubulovillous adenoma in pathology.   - S/P partial colectomy on 8/7, will follow pathology and follow up with surgery/GI outpatient.   - Continue Zosyn 3.375gm q8h until his discharge today. No further antibiotic after that.   - Trend WBC, 18k-->6k  - Source of infection has been removed, didn't have peritonitis and bacteremia has been resolved with total 10days of antibiotic treatment.     HBV:  - Continue Entecavir   - LFTs are normal, TB mild elevation  - HBV VL PCR pending    Will sign off please call with any question.     Irma Grant MD  Division of Infectious Diseases   [phone]

## 2020-08-11 NOTE — DISCHARGE NOTE NURSING/CASE MANAGEMENT/SOCIAL WORK - PATIENT PORTAL LINK FT
You can access the FollowMyHealth Patient Portal offered by [facility] by registering at the following website: [email or web address]. By joining [employer]’s FollowMyHealth portal, you will also be able to view your health information using other applications (apps) compatible with our system.

## 2020-08-11 NOTE — PROGRESS NOTE ADULT - SUBJECTIVE AND OBJECTIVE BOX
STATUS POST:  Right hemicolectomy with appendectomy and small bowel resection    POST OPERATIVE DAY #:  4    SUBJECTIVE:  Patient seen and examined at bedside.  No overnight events.  Patient offers no complaints at this time.  Advanced to regular diet, but has not gotten breakfast yet.    Vital Signs Last 24 Hrs  T(C): 36.8 (11 Aug 2020 04:45), Max: 36.8 (11 Aug 2020 04:45)  T(F): 98.2 (11 Aug 2020 04:45), Max: 98.2 (11 Aug 2020 04:45)  HR: 63 (11 Aug 2020 04:45) (63 - 65)  BP: 119/76 (11 Aug 2020 04:45) (114/78 - 129/84)  RR: 16 (11 Aug 2020 04:45) (16 - 18)  SpO2: 97% (11 Aug 2020 04:45) (97% - 99%)    PHYSICAL EXAM  GENERAL:  Well-nourished, well-developed male lying comfortably in bed in NAD  HEAD:  Normocephalic, atraumatic  CARDIO:  Regular rate and rhythm.  No murmur, gallop or rub appreciated.  RESPIRATORY:  Clear to auscultation bilaterally.  No wheezing, rales or rhonchi appreciated.  ABDOMEN:  Soft, nondistended, nontender; Midline incision remains well-approximated with sutures.  No erythema or drainage from incision sites.  BS appreciated on auscultation.  No rebound tenderness or guarding.  NEURO:  A&O x 3    MEDICATIONS  (STANDING):  acetaminophen   Tablet .. 1000 milliGRAM(s) Oral every 6 hours  amLODIPine   Tablet 5 milliGRAM(s) Oral daily  atorvastatin 40 milliGRAM(s) Oral at bedtime  entecavir 0.5 milliGRAM(s) Oral daily  heparin   Injectable 5000 Unit(s) SubCutaneous every 8 hours  pantoprazole    Tablet 40 milliGRAM(s) Oral before breakfast  piperacillin/tazobactam IVPB.. 3.375 Gram(s) IV Intermittent every 8 hours  senna 2 Tablet(s) Oral at bedtime  valsartan 160 milliGRAM(s) Oral daily    MEDICATIONS  (PRN):  guaiFENesin  milliGRAM(s) Oral every 12 hours PRN Cough/phlegm  ketorolac   Injectable 30 milliGRAM(s) IV Push every 6 hours PRN Mild Pain (1 - 3)  oxyCODONE    IR 5 milliGRAM(s) Oral every 4 hours PRN Moderate Pain (4 - 6)  oxyCODONE    IR 10 milliGRAM(s) Oral every 4 hours PRN Severe Pain (7 - 10)    LABS:                        10.8   6.08  )-----------( 391      ( 10 Aug 2020 06:28 )             32.7     08-10    144  |  110<H>  |  8   ----------------------------<  96  3.4<L>   |  28  |  0.87    Ca    8.2<L>      10 Aug 2020 06:28    ASSESSMENT & PLAN  45 year old male POD#4 s/p Right hemicolectomy with appendectomy and small bowel resection for ascending colon mass.    - Low fiber diet  - Follow up pathology  - Continue zosyn per ID  - DVT prophylaxis with SQH  - Encourage ambulation  - Incentive spirometry  - Pain control PRN  - Case to be discussed with Dr. Schafer STATUS POST:  Right hemicolectomy with appendectomy and small bowel resection      POST OPERATIVE DAY #:  4      SUBJECTIVE:  Patient seen and examined at bedside.  No overnight events.  Patient offers no complaints at this time.  Advanced to regular diet, but has not gotten breakfast yet.      Vital Signs Last 24 Hrs  T(F): 98.2 (11 Aug 2020 04:45), Max: 98.2 (11 Aug 2020 04:45)  HR: 63 (11 Aug 2020 04:45) (63 - 65)  BP: 119/76 (11 Aug 2020 04:45) (114/78 - 129/84)  RR: 16 (11 Aug 2020 04:45) (16 - 18)  SpO2: 97% (11 Aug 2020 04:45) (97% - 99%)      PHYSICAL EXAM  GENERAL:  Well-nourished, well-developed male lying comfortably in bed in NAD  HEAD:  Normocephalic, atraumatic  CARDIO:  Regular rate and rhythm.  No murmur, gallop or rub appreciated.  RESPIRATORY:  Clear to auscultation bilaterally.  No wheezing, rales or rhonchi appreciated.  ABDOMEN:  Soft, nondistended, nontender; Midline incision remains well-approximated with sutures.  No erythema or drainage from incision sites.  BS appreciated on auscultation.  No rebound tenderness or guarding.  NEURO:  A&O x 3      MEDICATIONS  (STANDING):  acetaminophen   Tablet .. 1000 milliGRAM(s) Oral every 6 hours  amLODIPine   Tablet 5 milliGRAM(s) Oral daily  atorvastatin 40 milliGRAM(s) Oral at bedtime  entecavir 0.5 milliGRAM(s) Oral daily  heparin   Injectable 5000 Unit(s) SubCutaneous every 8 hours  pantoprazole    Tablet 40 milliGRAM(s) Oral before breakfast  piperacillin/tazobactam IVPB.. 3.375 Gram(s) IV Intermittent every 8 hours  senna 2 Tablet(s) Oral at bedtime  valsartan 160 milliGRAM(s) Oral daily      MEDICATIONS  (PRN):  guaiFENesin  milliGRAM(s) Oral every 12 hours PRN Cough/phlegm  ketorolac   Injectable 30 milliGRAM(s) IV Push every 6 hours PRN Mild Pain (1 - 3)  oxyCODONE    IR 5 milliGRAM(s) Oral every 4 hours PRN Moderate Pain (4 - 6)  oxyCODONE    IR 10 milliGRAM(s) Oral every 4 hours PRN Severe Pain (7 - 10)      LABS:                        10.8   6.08  )-----------( 391      ( 10 Aug 2020 06:28 )             32.7     08-10    144  |  110<H>  |  8   ----------------------------<  96  3.4<L>   |  28  |  0.87    Ca    8.2<L>      10 Aug 2020 06:28    ASSESSMENT & PLAN  45 year old male POD#4 s/p Right hemicolectomy with appendectomy and small bowel resection for ascending colon mass.      - Low fiber diet  - Follow up pathology  - Continue zosyn per ID  - DVT prophylaxis with SQH  - Encourage ambulation  - Incentive spirometry  - Pain control PRN    - Case to be discussed with Dr. Schafer

## 2020-08-11 NOTE — PROGRESS NOTE ADULT - NSHPATTENDINGPLANDISCUSS_GEN_ALL_CORE
Dr. Clayton.
Dr. Clayton.
patient in detail in person, wife in detail by phone, Gastroenterology attending, Surgery PA and today's RN.
patient in detail, Hospitalist attending, Surgery PA and today's RN.
Cristi Mike and .
patient in detail in person, wife in detail by phone, Hospitalist attending, Gastroenterology team, Surgery PA and tonight's RN.
patient in detail, Gastroenterology attending, Surgery PA and today's RN.
patient in detail, Hospitalist attending, Surgery PA and today's RN.
patient in detail, Hospitalist team, Gastroenterology attending, Surgery PA and today's RN.
pt / nurse
pt / nurse
pt / nurse/ surg pa

## 2020-08-11 NOTE — PROGRESS NOTE ADULT - PROVIDER SPECIALTY LIST ADULT
Gastroenterology
Hospitalist
Infectious Disease
Internal Medicine
Surgery
Gastroenterology

## 2020-08-11 NOTE — PROGRESS NOTE ADULT - ASSESSMENT
All as above in PA notation.  Patient personally seen and examined.  In excellent spirits and eager for discharge.  Vitals non-suggestive.  Abdomen soft, Steri Strips clean, wound clean.  Labs non-suggestive.  Pathology pending.  Now that tolerating advancement of diet and off ABX, no objection to discharge to home.  Outpatient follow-up with me in 1 to 2 weeks.  Instructions reviewed personally in detail.  Encouraged to call with questions, concerns, changes...

## 2020-08-11 NOTE — PROGRESS NOTE ADULT - REASON FOR ADMISSION
Possible Colitis
abdominal pain
Possible Colitis

## 2020-08-11 NOTE — PROGRESS NOTE ADULT - PROBLEM SELECTOR PLAN 1
s/p right hemicolectomy  post op doing well  surgical care greatly appreciated  diet as tolerated  prn pain control  outpatient f/u once path back   dc planning in progress

## 2020-08-11 NOTE — DISCHARGE NOTE PROVIDER - NSDCCPCAREPLAN_GEN_ALL_CORE_FT
PRINCIPAL DISCHARGE DIAGNOSIS  Diagnosis: Transmural colitis  Assessment and Plan of Treatment: s/p rt  hemicolectomy - on low residue diet , fu up surgery  dr pino office in 1 to 2wk .      SECONDARY DISCHARGE DIAGNOSES  Diagnosis: HTN (hypertension)  Assessment and Plan of Treatment: HTN (hypertension) - continue home meds  , low salt diet    Diagnosis: Bacteremia  Assessment and Plan of Treatment: Bacteremia clostridium  s/p iv abx 5 days - repeat blood cult remain neg .

## 2020-08-11 NOTE — DISCHARGE NOTE PROVIDER - NSDCMRMEDTOKEN_GEN_ALL_CORE_FT
amlodipine-valsartan 5 mg-160 mg oral tablet: 1 tab(s) orally once a day  Baraclude 0.5 mg oral tablet: 1 tab(s) orally once a day  entecavir 0.5 mg oral tablet: 1 tab(s) orally once a day  pantoprazole 40 mg oral delayed release tablet: 1 tab(s) orally once a day (before a meal)   rosuvastatin 10 mg oral tablet: 1 tab(s) orally once a day  senna oral tablet: 2 tab(s) orally once a day (at bedtime), As Needed

## 2020-08-11 NOTE — DISCHARGE NOTE NURSING/CASE MANAGEMENT/SOCIAL WORK - NSDCPNINST_GEN_ALL_CORE
any persistent or worsening abdominal pain, nausea, vomiting, fever, shortness of breath or chest pain gp to the nearest emergency room . NO heavy lifting.

## 2020-08-11 NOTE — DISCHARGE NOTE PROVIDER - NSDCFUADDAPPT_GEN_ALL_CORE_FT
fu up surg office dr pino in 1 to 2 wk.  fu repeat electrolyte / cbc in 1wk .your  surg path is pending fu up out pt  for result  surgery office .

## 2020-08-11 NOTE — DISCHARGE NOTE PROVIDER - DISCHARGE DATE
AVS discussed w/ mom. PIV removed. No hugs band in place. Tele/pox/apnea monitor removed. Mom to f/u w/ dr sewell in next 3 weeks. Will p/u pepcid from [facility] near house in morning.  
Nursing Transfer Note    Receiving Transfer Note    2020 8:45 AM  Received in transfer from PACU to PEDS 408  Report received as documented in PER Handoff on Doc Flowsheet.  See Doc Flowsheet for VS's and complete assessment.  Continuous EKG monitoring in place Yes  Chart received with patient: Yes  What Caregiver / Guardian was Notified of Arrival: Mother  Patient and / or caregiver / guardian oriented to room and nurse call system.  ZIYAD carlton RN  2020 8:45 AM        
11-Aug-2020

## 2020-08-11 NOTE — PROGRESS NOTE ADULT - PROBLEM SELECTOR PROBLEM 2
Bacteremia
Hepatitis B
Transmural colitis
Hepatitis B

## 2020-08-11 NOTE — PROGRESS NOTE ADULT - SUBJECTIVE AND OBJECTIVE BOX
INTERVAL HPI/OVERNIGHT EVENTS:  pt seen and examined  feels well  cristopher diet   +bm      Allergies    ibuprofen (Other)  ibuprofen (Rash)    Intolerances          General:  No wt loss, fevers, chills, night sweats, fatigue,   Eyes:  Good vision, no reported pain  ENT:  No sore throat, pain, runny nose, dysphagia  CV:  No pain, palpitations, hypo/hypertension  Resp:  No dyspnea, cough, tachypnea, wheezing  GI:  No pain, No nausea, No vomiting, No diarrhea, No constipation, No weight loss, No fever, No pruritis, No rectal bleeding, No tarry stools, No dysphagia,  :  No pain, bleeding, incontinence, nocturia  Muscle:  No pain, weakness  Neuro:  No weakness, tingling, memory problems  Psych:  No fatigue, insomnia, mood problems, depression  Endocrine:  No polyuria, polydipsia, cold/heat intolerance  Heme:  No petechiae, ecchymosis, easy bruisability  Skin:  No rash, tattoos, scars, edema      PHYSICAL EXAM:   Vital Signs:  Vital Signs Last 24 Hrs  T(C): 36.8 (09 Aug 2020 12:36), Max: 37.2 (08 Aug 2020 21:25)  T(F): 98.2 (09 Aug 2020 12:36), Max: 98.9 (08 Aug 2020 21:25)  HR: 80 (09 Aug 2020 12:36) (71 - 80)  BP: 123/85 (09 Aug 2020 12:36) (117/76 - 123/85)  BP(mean): --  RR: 18 (09 Aug 2020 12:36) (18 - 18)  SpO2: 96% (09 Aug 2020 12:36) (96% - 97%)  Daily     Daily I&O's Summary    08 Aug 2020 07:01  -  09 Aug 2020 07:00  --------------------------------------------------------  IN: 1630 mL / OUT: 1950 mL / NET: -320 mL    09 Aug 2020 07:01  -  09 Aug 2020 18:06  --------------------------------------------------------  IN: 600 mL / OUT: 1400 mL / NET: -800 mL        GENERAL:  nad  HEENT:  NC/AT  ABDOMEN: soft mild incisional ttp  mild dt staple line c/d/i  EXTREMITIES:  no edema  NEURO:  A/O x3      LABS:                        10.7   7.30  )-----------( 347      ( 09 Aug 2020 07:24 )             33.0     08-09    144  |  113<H>  |  8   ----------------------------<  104<H>  3.8   |  28  |  0.79    Ca    8.1<L>      09 Aug 2020 07:24    TPro  7.1  /  Alb  3.0<L>  /  TBili  0.7  /  DBili  x   /  AST  19  /  ALT  26  /  AlkPhos  64  08-08        amylase   lipase  RADIOLOGY & ADDITIONAL TESTS:

## 2020-08-11 NOTE — PROGRESS NOTE ADULT - ATTENDING COMMENTS
Advanced care planning was discussed with patient and family.  Advanced care planning forms were reviewed and discussed.  Risks, benefits and alternatives of gastroenterologic procedures were discussed in detail and all questions were answered.    30 minutes spent.
pt seen and examine see above plan -  admitted     abd pain    found to have  associated colitis  also with Clostridium septicum bacteremia / found to have  Colonic mass-   biopsy  report  tubulovillous adenoma -  now  POD1 s/p lap R hemicolectomy, appendectomy by surgery - started n clear liquid diet advance as per surgical team , repeat blood cult neg , on iv abx Zosyn 3.375 gm q8hr , leucocytosis resolved  . hypokalemia replaced - fu bmp in am .
pt seen and examine see above plan -  admitted     abd pain    found to have  associated colitis  also with Clostridium septicum bacteremia / found to have  Colonic mass-   biopsy  report  tubulovillous adenoma -  now  POD2 s/p lap R hemicolectomy, appendectomy by surgery - started on  clear liquid diet advance as per surgical team , repeat blood cult neg  ,  stool cult  neg , on iv abx Zosyn 3.375 gm q8hr , leucocytosis resolved  . hypokalemia replaced - repeat k wnl .
pt seen and examine see above plan -  admitted     abd pain    found to have  associated colitis  also with Clostridium septicum bacteremia / found to have  Colonic mass-   biopsy  report  tubulovillous adenoma -  now  POD3  s/p lap R hemicolectomy, appendectomy by surgery - started on  clear liquid diet advance  as tolerated in am by surgery  , repeat blood cult neg  ,  stool cult  neg , on iv abx Zosyn 3.375 gm q8hr , leucocytosis resolved  . hypokalemia replaced - repeat k in am.
Resume diet once cleared by surgical team.

## 2020-08-11 NOTE — PROGRESS NOTE ADULT - PROBLEM SELECTOR PROBLEM 3
Hepatitis B
Bacteremia
HTN (hypertension)
Hepatitis B
Hepatitis B
HTN (hypertension)

## 2020-08-12 PROBLEM — Z00.00 ENCOUNTER FOR PREVENTIVE HEALTH EXAMINATION: Status: ACTIVE | Noted: 2020-08-12

## 2020-08-12 LAB — SURGICAL PATHOLOGY STUDY: SIGNIFICANT CHANGE UP

## 2020-08-13 PROBLEM — I10 ESSENTIAL (PRIMARY) HYPERTENSION: Chronic | Status: ACTIVE | Noted: 2020-08-02

## 2020-08-14 ENCOUNTER — RESULT REVIEW (OUTPATIENT)
Age: 45
End: 2020-08-14

## 2020-08-16 ENCOUNTER — OUTPATIENT (OUTPATIENT)
Dept: OUTPATIENT SERVICES | Facility: HOSPITAL | Age: 45
LOS: 1 days | Discharge: HOME | End: 2020-08-16

## 2020-08-16 DIAGNOSIS — C18.9 MALIGNANT NEOPLASM OF COLON, UNSPECIFIED: ICD-10-CM

## 2020-08-18 ENCOUNTER — APPOINTMENT (OUTPATIENT)
Dept: SURGERY | Facility: CLINIC | Age: 45
End: 2020-08-18
Payer: COMMERCIAL

## 2020-08-18 VITALS
HEART RATE: 86 BPM | HEIGHT: 66 IN | WEIGHT: 166.45 LBS | SYSTOLIC BLOOD PRESSURE: 110 MMHG | DIASTOLIC BLOOD PRESSURE: 80 MMHG | OXYGEN SATURATION: 99 % | BODY MASS INDEX: 26.75 KG/M2

## 2020-08-18 PROBLEM — Z00.00 ENCOUNTER FOR PREVENTIVE HEALTH EXAMINATION: Noted: 2020-08-18

## 2020-08-18 PROCEDURE — 99024 POSTOP FOLLOW-UP VISIT: CPT

## 2020-08-18 RX ORDER — ROSUVASTATIN CALCIUM 10 MG/1
10 TABLET, FILM COATED ORAL
Refills: 0 | Status: ACTIVE | COMMUNITY

## 2020-08-18 NOTE — HISTORY OF PRESENT ILLNESS
[de-identified] : Very pleasant gentleman presenting to office today for surgical follow-up.\par Now s/p recent hospitalization with uneventful anesthesia and uncomplicated right hemicolectomy with small bowel resection for colon Ca.\par He is understandably anxious, but in reasonable spirits overall.\par He admits to some fatigue, but denies malaise.  He notes minimal incisional discomfort, but denies temo pain or more diffuse abdominal discomfort.\par He reports tolerating his regular diet.  He denies change in bowel habit since discharge.  He has no urinary complaints...

## 2020-08-18 NOTE — REVIEW OF SYSTEMS
[Feeling Tired] : feeling tired [Anxiety] : anxiety [Negative] : Heme/Lymph [Feeling Poorly] : not feeling poorly [Depression] : no depression [de-identified] : regarding this visit and issue...

## 2020-08-18 NOTE — PHYSICAL EXAM
[Respiratory Effort] : normal respiratory effort [Normal Rate and Rhythm] : normal rate and rhythm [Tender] : was nontender [No HSM] : no hepatosplenomegaly [No Rash or Lesion] : No rash or lesion [Enlarged] : not enlarged [Oriented to Person] : oriented to person [Alert] : alert [Oriented to Place] : oriented to place [Oriented to Time] : oriented to time [de-identified] : Appears well, no acute distress, ambulates easily into office and assumes examination table without need of assistance. [Anxious] : anxious [de-identified] : Supple with full range of motion. [de-identified] : Normocephalic and atraumatic. [de-identified] : No visible lesions or palpable masses. [FreeTextEntry1] : No cervical, supraclavicular, axillary or inguinal adenopathy. [de-identified] : Normal external genitalia. [de-identified] : Soft and non tense and nontender.\par Wounds all clean and dry and intact.\par No expressible drainage.\par No appreciable odor.\par No overlying or surrounding acute inflammatory changes.\par Moderate post-operative edema and ecchymoses.\par No SQ collections to suggest hematoma or seroma or abscess.\par Entire abdomen free of guarding or rebound or referred pain. [de-identified] : Grossly symmetric and within normal limits without any obvious motor or sensory deficits. [de-identified] : Deferred. [de-identified] : though not inappropriately so...

## 2020-08-18 NOTE — DATA REVIEWED
[FreeTextEntry1] : MARY KIM                        1\par Surgical Consult Report\par Final Diagnosis\par Submitting Institution:  [facility]\par Date of Procedure:  8/7/2020\par 1. Right colon and terminal ileum, laparoscopic radial resection\par of right colon and small bowel resection and ileocolostomy (4 H&E\par slides [id number]-; 1F1, 1I1, 1N1, 1W1):\par - Invasive adenocarcinoma, moderately differentiated, see\par note.\par - Perineural invasion is identified.\par - Intermediate grade tumor budding identified.\par - Pathological stage (AJCC 8th edition): pT3N2a.\par Note: The submitted slides show one lymph node involved by\par adenocarcinoma.  Per attached pathology report the following\par reported findings are noted: Tumor site - ascending colon, tumor\par size - 5.5 x 5.2 x 1.5 cm, resection margins uninvolved, five (5)\par out of nineteen (19) lymph nodes involved by\par adenocarcinoma.\par Verified by: Trent Cheung M.D. (Electronic Signature)\par Reported on: 08/17/20 10:10 EDT, [address] [place] [address]\par Phone: [phone]   Fax: [phone]\par _________________________________________________________________

## 2020-08-18 NOTE — PLAN
[FreeTextEntry1] : S/P uneventful general anesthesia with laparoscopy for right hemicolectomy with small bowel resection.\par Initial endoscopic biopsy benign- though ultimate Pathology report of operative specimen positive for adenoCa.\par Clinically well by this history and surgically stable by this examination today.\par No evidence by history and examination to suggest complication.\par Cleared to resume casual activities with own comfort as guide.\par To refrain from maximal exertions for another month.\par Timing and technique of scar massage reviewed in detail.\par Pathology report and it's malignant nature reviewed in detail- patient and wife provided copy.\par Encouraged to call with questions or concerns or difficulties in obtaining appropriate follow-up.\par Otherwise, RTO 1 month.\par Appointment with Oncology scheduled for tomorrow and patient aware of need for adjuvant therapy.\par Patient agrees and leaves in fair spirits being able to verbalize plan with instructions as outlined above.\par With his fatigue, incisional discomfort and new diagnosis of node positive colon cancer, he is not ready to return to work at this time.  I have suggested a reassessment of this issue when he is next seen...

## 2020-08-19 ENCOUNTER — RESULT REVIEW (OUTPATIENT)
Age: 45
End: 2020-08-19

## 2020-08-19 ENCOUNTER — APPOINTMENT (OUTPATIENT)
Age: 45
End: 2020-08-19
Payer: COMMERCIAL

## 2020-08-19 VITALS
HEART RATE: 73 BPM | BODY MASS INDEX: 28.07 KG/M2 | RESPIRATION RATE: 14 BRPM | HEIGHT: 64.57 IN | DIASTOLIC BLOOD PRESSURE: 90 MMHG | TEMPERATURE: 98.3 F | WEIGHT: 166.45 LBS | OXYGEN SATURATION: 99 % | SYSTOLIC BLOOD PRESSURE: 134 MMHG

## 2020-08-19 DIAGNOSIS — Z86.39 PERSONAL HISTORY OF OTHER ENDOCRINE, NUTRITIONAL AND METABOLIC DISEASE: ICD-10-CM

## 2020-08-19 LAB
ALBUMIN SERPL ELPH-MCNC: 5.2 G/DL
ALP BLD-CCNC: 106 U/L
ALT SERPL-CCNC: 52 U/L
ANION GAP SERPL CALC-SCNC: 15 MMOL/L
APTT BLD: 34.4 SEC
AST SERPL-CCNC: 25 U/L
BASOPHILS # BLD AUTO: 0.04 K/UL — SIGNIFICANT CHANGE UP (ref 0–0.2)
BASOPHILS NFR BLD AUTO: 0.5 % — SIGNIFICANT CHANGE UP (ref 0–2)
BILIRUB SERPL-MCNC: 0.7 MG/DL
BUN SERPL-MCNC: 14 MG/DL
CALCIUM SERPL-MCNC: 10.2 MG/DL
CEA SERPL-MCNC: 2.7 NG/ML
CHLORIDE SERPL-SCNC: 101 MMOL/L
CO2 SERPL-SCNC: 24 MMOL/L
CREAT SERPL-MCNC: 0.9 MG/DL
EOSINOPHIL # BLD AUTO: 0.14 K/UL — SIGNIFICANT CHANGE UP (ref 0–0.5)
EOSINOPHIL NFR BLD AUTO: 1.9 % — SIGNIFICANT CHANGE UP (ref 0–6)
GLUCOSE SERPL-MCNC: 112 MG/DL
HBV CORE IGG+IGM SER QL: REACTIVE
HBV CORE IGM SER QL: NONREACTIVE
HBV SURFACE AB SER QL: NONREACTIVE
HBV SURFACE AG SER QL: REACTIVE
HCT VFR BLD CALC: 41.3 % — SIGNIFICANT CHANGE UP (ref 39–50)
HCV AB SER QL: NONREACTIVE
HCV S/CO RATIO: 0.12 S/CO
HGB BLD-MCNC: 13.2 G/DL — SIGNIFICANT CHANGE UP (ref 13–17)
IMM GRANULOCYTES NFR BLD AUTO: 0.7 % — SIGNIFICANT CHANGE UP (ref 0–1.5)
INR PPP: 1.03 RATIO
LYMPHOCYTES # BLD AUTO: 1.64 K/UL — SIGNIFICANT CHANGE UP (ref 1–3.3)
LYMPHOCYTES # BLD AUTO: 22 % — SIGNIFICANT CHANGE UP (ref 13–44)
MCHC RBC-ENTMCNC: 27.8 PG — SIGNIFICANT CHANGE UP (ref 27–34)
MCHC RBC-ENTMCNC: 32 GM/DL — SIGNIFICANT CHANGE UP (ref 32–36)
MCV RBC AUTO: 86.9 FL — SIGNIFICANT CHANGE UP (ref 80–100)
MONOCYTES # BLD AUTO: 0.41 K/UL — SIGNIFICANT CHANGE UP (ref 0–0.9)
MONOCYTES NFR BLD AUTO: 5.5 % — SIGNIFICANT CHANGE UP (ref 2–14)
NEUTROPHILS # BLD AUTO: 5.16 K/UL — SIGNIFICANT CHANGE UP (ref 1.8–7.4)
NEUTROPHILS NFR BLD AUTO: 69.4 % — SIGNIFICANT CHANGE UP (ref 43–77)
NRBC # BLD: 0 /100 WBCS — SIGNIFICANT CHANGE UP (ref 0–0)
PLATELET # BLD AUTO: 579 K/UL — HIGH (ref 150–400)
POTASSIUM SERPL-SCNC: 5.4 MMOL/L
PROT SERPL-MCNC: 8.1 G/DL
PT BLD: 12.2 SEC
RBC # BLD: 4.75 M/UL — SIGNIFICANT CHANGE UP (ref 4.2–5.8)
RBC # FLD: 13.5 % — SIGNIFICANT CHANGE UP (ref 10.3–14.5)
SODIUM SERPL-SCNC: 140 MMOL/L
WBC # BLD: 7.44 K/UL — SIGNIFICANT CHANGE UP (ref 3.8–10.5)
WBC # FLD AUTO: 7.44 K/UL — SIGNIFICANT CHANGE UP (ref 3.8–10.5)

## 2020-08-19 PROCEDURE — 99205 OFFICE O/P NEW HI 60 MIN: CPT

## 2020-08-20 LAB
HBV DNA # SERPL NAA+PROBE: NOT DETECTED IU/ML
HEPB DNA PCR LOG: NOT DETECTED LOG10IU/ML

## 2020-08-24 ENCOUNTER — RESULT REVIEW (OUTPATIENT)
Age: 45
End: 2020-08-24

## 2020-08-24 ENCOUNTER — OUTPATIENT (OUTPATIENT)
Dept: OUTPATIENT SERVICES | Facility: HOSPITAL | Age: 45
LOS: 1 days | End: 2020-08-24
Payer: COMMERCIAL

## 2020-08-24 ENCOUNTER — APPOINTMENT (OUTPATIENT)
Dept: CT IMAGING | Facility: CLINIC | Age: 45
End: 2020-08-24
Payer: COMMERCIAL

## 2020-08-24 DIAGNOSIS — C18.9 MALIGNANT NEOPLASM OF COLON, UNSPECIFIED: ICD-10-CM

## 2020-08-24 DIAGNOSIS — Z11.59 ENCOUNTER FOR SCREENING FOR OTHER VIRAL DISEASES: ICD-10-CM

## 2020-08-24 LAB — SARS-COV-2 RNA SPEC QL NAA+PROBE: SIGNIFICANT CHANGE UP

## 2020-08-24 PROCEDURE — U0003: CPT

## 2020-08-24 PROCEDURE — 71260 CT THORAX DX C+: CPT | Mod: 26

## 2020-08-24 PROCEDURE — 71260 CT THORAX DX C+: CPT

## 2020-08-27 ENCOUNTER — OUTPATIENT (OUTPATIENT)
Dept: OUTPATIENT SERVICES | Facility: HOSPITAL | Age: 45
LOS: 1 days | End: 2020-08-27
Payer: COMMERCIAL

## 2020-08-27 ENCOUNTER — RESULT REVIEW (OUTPATIENT)
Age: 45
End: 2020-08-27

## 2020-08-27 VITALS
SYSTOLIC BLOOD PRESSURE: 119 MMHG | HEART RATE: 71 BPM | DIASTOLIC BLOOD PRESSURE: 84 MMHG | OXYGEN SATURATION: 99 % | RESPIRATION RATE: 14 BRPM

## 2020-08-27 VITALS
SYSTOLIC BLOOD PRESSURE: 127 MMHG | DIASTOLIC BLOOD PRESSURE: 98 MMHG | HEART RATE: 87 BPM | HEIGHT: 66 IN | OXYGEN SATURATION: 100 % | RESPIRATION RATE: 18 BRPM | WEIGHT: 164.91 LBS | TEMPERATURE: 98 F

## 2020-08-27 DIAGNOSIS — Z90.49 ACQUIRED ABSENCE OF OTHER SPECIFIED PARTS OF DIGESTIVE TRACT: Chronic | ICD-10-CM

## 2020-08-27 DIAGNOSIS — C18.9 MALIGNANT NEOPLASM OF COLON, UNSPECIFIED: ICD-10-CM

## 2020-08-27 PROCEDURE — 77001 FLUOROGUIDE FOR VEIN DEVICE: CPT | Mod: 26

## 2020-08-27 PROCEDURE — C1788: CPT

## 2020-08-27 PROCEDURE — 77001 FLUOROGUIDE FOR VEIN DEVICE: CPT

## 2020-08-27 PROCEDURE — C1769: CPT

## 2020-08-27 PROCEDURE — C1887: CPT

## 2020-08-27 PROCEDURE — 36561 INSERT TUNNELED CV CATH: CPT

## 2020-08-27 PROCEDURE — C1894: CPT

## 2020-08-27 PROCEDURE — 76937 US GUIDE VASCULAR ACCESS: CPT | Mod: 26

## 2020-08-27 PROCEDURE — 76937 US GUIDE VASCULAR ACCESS: CPT

## 2020-08-27 NOTE — ASU DISCHARGE PLAN (ADULT/PEDIATRIC) - PROCEDURE
Please feel free to contact us at [phone] if any  problem arises. After 6PM, Monday through Friday on weekends and on holidays, please call [phone] and ask for the radiology resident on call to be paged. Please feel free to contact us at [phone] if any  problem arises. After 6PM, Monday through Friday on weekends and on holidays, please call [phone] and ask for the radiology resident on call to be paged.

## 2020-08-27 NOTE — ASU DISCHARGE PLAN (ADULT/PEDIATRIC) - ASU DC SPECIAL INSTRUCTIONSFT
Chest Port Placement    Discharge Instructions  - You have had a chest port implated in your chest.   - The port is ready for use.  - You may shower in 48 hours. No soaking or swimming for 2 weeks or until the site is completely healed.  - Keep the area covered and dry for the next 7 days. It may be removed by a chemotherapy nurse as needed for treatment.  - Do not perform any heavy lifting or put tension on the area for the next week or until the site is healed.  - Do not remove steri-strips. They will fall of in 2-3 weeks  - You may resume your normal diet.  - You may resume your normal medications however you should wait 48 hours before restarting aspirin, plavix, or blood thinners.  - It is normal to experience some pain over the site for the next few days. You may take apply ice to the area (20 minutes on, 20 minutes off) and take Tylenol for that pain. Do not take more frequently than every 6 hours and do not exceed more than 3000mg of Tylenol in a 24 hour period.    - You were given conscious sedation which may make you drowsy, therefore you need someone to stay with you until the morning following the procedure.  - Do not drive, engage in heavy lifting or strenuous activity, or drink any alcoholic beverages for the next 24 hours.   - You may resume normal activity in 24 hours.    Notify your primary physician and/or Interventional Radiology IMMEDIATELY if you experience any of the following       - Fever of 100.4F or 38C       - Chills or Rigors/ Shakes       - Swelling and/or Redness in the area around the port       - Worsening Pain       - Blood soaked bandages or worsening bleeding       - Lightheadedness and/or dizziness upon standing       - Chest Pain/ Tightness       - Shortness of Breath       - Difficulty walking    If you have a problem that you believe requires IMMEDIATE attention, please go to your NEAREST Emergency Room. If you believe your problem can safely wait until you speak to a physician, please call Interventional Radiology for any concerns.    During Normal Weekday Business Hours- You can contact the Interventional Radiology department during normal business hours via telephone.  During Evenings and Weekends- If you need to contact Interventional Radiology during off hours, do so by calling the hospital and requesting to be connected to the Interventional Radiologist on call.

## 2020-08-27 NOTE — PRE PROCEDURE NOTE - PRE PROCEDURE EVALUATION
Interventional Radiology Pre-Procedure Note    This is a 45y Male with PMH of colon CA now requiring access for chemotherapy. Patient reports he is scheduled for chemotherapy next week. Denies fever, chills, nausea, vomiting, chest pain, SOB.     NPO: 6 pm last night  Antibiotics: none  Anticoagulation: none  Adverse events to anesethsia: Denies   Objection to blood products: no objection    PAST MEDICAL & SURGICAL HISTORY:  HTN (hypertension)  HTN (hypertension)  Hepatitis B  History of colon resection     Vital Signs Last 24 Hrs  T(C): 36.7 (27 Aug 2020 09:02), Max: 36.7 (27 Aug 2020 08:38)  T(F): 98 (27 Aug 2020 08:38), Max: 98 (27 Aug 2020 08:38)  HR: 87 (27 Aug 2020 09:02) (87 - 87)  BP: 127/98 (27 Aug 2020 09:02) (127/98 - 127/98)  RR: 18 (27 Aug 2020 09:02) (18 - 18)  SpO2: 100% (27 Aug 2020 09:02) (100% - 100%)    Allergies: ibuprofen: angioedema    Physical Exam: Gen: NAD, A&Ox3    Plan is for chest wall port placement. Informed consent obtained. All questions and concerns have been addressed at this time.

## 2020-08-27 NOTE — ASU DISCHARGE PLAN (ADULT/PEDIATRIC) - CALL YOUR DOCTOR IF YOU HAVE ANY OF THE FOLLOWING:
Wound/Surgical Site with redness, or foul smelling discharge or pus/Bleeding that does not stop/Swelling that gets worse/Fever greater than (need to indicate Fahrenheit or Celsius)/Pain not relieved by Medications

## 2020-08-27 NOTE — ASU DISCHARGE PLAN (ADULT/PEDIATRIC) - MEDICATION INSTRUCTIONS
Extra strength tylenol. If you have pain that is not relieved with tylenol please kacey our department at [phone] or [phone]

## 2020-09-01 ENCOUNTER — TRANSCRIPTION ENCOUNTER (OUTPATIENT)
Age: 45
End: 2020-09-01

## 2020-09-02 ENCOUNTER — RESULT REVIEW (OUTPATIENT)
Age: 45
End: 2020-09-02

## 2020-09-02 ENCOUNTER — APPOINTMENT (OUTPATIENT)
Age: 45
End: 2020-09-02

## 2020-09-02 ENCOUNTER — APPOINTMENT (OUTPATIENT)
Dept: HEMATOLOGY ONCOLOGY | Facility: CLINIC | Age: 45
End: 2020-09-02

## 2020-09-02 DIAGNOSIS — Z51.11 ENCOUNTER FOR ANTINEOPLASTIC CHEMOTHERAPY: ICD-10-CM

## 2020-09-02 DIAGNOSIS — R11.2 NAUSEA WITH VOMITING, UNSPECIFIED: ICD-10-CM

## 2020-09-02 DIAGNOSIS — Z45.2 ENCOUNTER FOR ADJUSTMENT AND MANAGEMENT OF VASCULAR ACCESS DEVICE: ICD-10-CM

## 2020-09-02 LAB
BASOPHILS # BLD AUTO: 0.03 K/UL — SIGNIFICANT CHANGE UP (ref 0–0.2)
BASOPHILS NFR BLD AUTO: 0.6 % — SIGNIFICANT CHANGE UP (ref 0–2)
BUN SERPL-MCNC: 10 MG/DL — SIGNIFICANT CHANGE UP (ref 7–23)
CA-I BLDA-SCNC: 1.17 MMOL/L — SIGNIFICANT CHANGE UP (ref 1.12–1.3)
CHLORIDE SERPL-SCNC: 107 MMOL/L — SIGNIFICANT CHANGE UP (ref 96–108)
CO2 SERPL-SCNC: 22 MMOL/L — SIGNIFICANT CHANGE UP (ref 22–31)
CREAT SERPL-MCNC: 0.6 MG/DL — SIGNIFICANT CHANGE UP (ref 0.5–1.3)
EOSINOPHIL # BLD AUTO: 0.13 K/UL — SIGNIFICANT CHANGE UP (ref 0–0.5)
EOSINOPHIL NFR BLD AUTO: 2.6 % — SIGNIFICANT CHANGE UP (ref 0–6)
GLUCOSE SERPL-MCNC: 104 MG/DL — HIGH (ref 70–99)
HCT VFR BLD CALC: 38.5 % — LOW (ref 39–50)
HGB BLD-MCNC: 12.7 G/DL — LOW (ref 13–17)
IMM GRANULOCYTES NFR BLD AUTO: 0.6 % — SIGNIFICANT CHANGE UP (ref 0–1.5)
LYMPHOCYTES # BLD AUTO: 1.81 K/UL — SIGNIFICANT CHANGE UP (ref 1–3.3)
LYMPHOCYTES # BLD AUTO: 36.6 % — SIGNIFICANT CHANGE UP (ref 13–44)
MCHC RBC-ENTMCNC: 27.9 PG — SIGNIFICANT CHANGE UP (ref 27–34)
MCHC RBC-ENTMCNC: 33 GM/DL — SIGNIFICANT CHANGE UP (ref 32–36)
MCV RBC AUTO: 84.4 FL — SIGNIFICANT CHANGE UP (ref 80–100)
MONOCYTES # BLD AUTO: 0.39 K/UL — SIGNIFICANT CHANGE UP (ref 0–0.9)
MONOCYTES NFR BLD AUTO: 7.9 % — SIGNIFICANT CHANGE UP (ref 2–14)
NEUTROPHILS # BLD AUTO: 2.56 K/UL — SIGNIFICANT CHANGE UP (ref 1.8–7.4)
NEUTROPHILS NFR BLD AUTO: 51.7 % — SIGNIFICANT CHANGE UP (ref 43–77)
NRBC # BLD: 0 /100 WBCS — SIGNIFICANT CHANGE UP (ref 0–0)
PLATELET # BLD AUTO: 258 K/UL — SIGNIFICANT CHANGE UP (ref 150–400)
POTASSIUM SERPL-MCNC: 3.7 MMOL/L — SIGNIFICANT CHANGE UP (ref 3.5–5.3)
POTASSIUM SERPL-SCNC: 3.7 MMOL/L — SIGNIFICANT CHANGE UP (ref 3.5–5.3)
RBC # BLD: 4.56 M/UL — SIGNIFICANT CHANGE UP (ref 4.2–5.8)
RBC # FLD: 13.3 % — SIGNIFICANT CHANGE UP (ref 10.3–14.5)
SODIUM SERPL-SCNC: 142 MMOL/L — SIGNIFICANT CHANGE UP (ref 135–145)
WBC # BLD: 4.95 K/UL — SIGNIFICANT CHANGE UP (ref 3.8–10.5)
WBC # FLD AUTO: 4.95 K/UL — SIGNIFICANT CHANGE UP (ref 3.8–10.5)

## 2020-09-04 ENCOUNTER — APPOINTMENT (OUTPATIENT)
Age: 45
End: 2020-09-04

## 2020-09-09 ENCOUNTER — APPOINTMENT (OUTPATIENT)
Age: 45
End: 2020-09-09
Payer: COMMERCIAL

## 2020-09-09 VITALS
BODY MASS INDEX: 26.96 KG/M2 | WEIGHT: 163.8 LBS | HEART RATE: 76 BPM | HEIGHT: 65.35 IN | DIASTOLIC BLOOD PRESSURE: 86 MMHG | SYSTOLIC BLOOD PRESSURE: 131 MMHG | TEMPERATURE: 98.7 F | RESPIRATION RATE: 15 BRPM | OXYGEN SATURATION: 100 %

## 2020-09-09 DIAGNOSIS — K21.9 GASTRO-ESOPHAGEAL REFLUX DISEASE W/OUT ESOPHAGITIS: ICD-10-CM

## 2020-09-09 PROCEDURE — 99214 OFFICE O/P EST MOD 30 MIN: CPT

## 2020-09-12 ENCOUNTER — OUTPATIENT (OUTPATIENT)
Dept: OUTPATIENT SERVICES | Facility: HOSPITAL | Age: 45
LOS: 1 days | Discharge: HOME | End: 2020-09-12

## 2020-09-12 DIAGNOSIS — C18.9 MALIGNANT NEOPLASM OF COLON, UNSPECIFIED: ICD-10-CM

## 2020-09-12 DIAGNOSIS — Z90.49 ACQUIRED ABSENCE OF OTHER SPECIFIED PARTS OF DIGESTIVE TRACT: Chronic | ICD-10-CM

## 2020-09-12 PROBLEM — Z86.39 HISTORY OF HYPERLIPIDEMIA: Status: RESOLVED | Noted: 2020-09-12 | Resolved: 2020-09-12

## 2020-09-12 NOTE — REASON FOR VISIT
[Initial Consultation] : an initial consultation [Spouse] : spouse [FreeTextEntry2] : Stage III colon cancer

## 2020-09-12 NOTE — HISTORY OF PRESENT ILLNESS
[Disease: _____________________] : Disease: [unfilled] [T: ___] : T[unfilled] [N: ___] : N[unfilled] [M: ___] : M[unfilled] [AJCC Stage: ____] : AJCC Stage: [unfilled] [de-identified] : 45 M with h/o chronic hepatitis B on entecavir presents for further management of resected high risk Stage III (T3/4N2), DAVIE R colon cancer, \par \par Bobby was admitted to [facility] on 8/2/20 with acute onset abdominal pain and fever. CT A/P showed circumferential mural thickening of the ascending colon with adjacent enlarged mesenteric LN. Primary consideration was a neoplasm. A colonoscopy on 8/4/20 showed an obstructing mass in the ascending colon. 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 On Aug 7, 2020, underwent a hemicolectomy, excised portion of small bowel. BCx from admission showed Clostridum septicum but repeat cultures were negative. Completed 2 weeks of antibiotics. Post op course otherwise was unremarkable. \par \par Referred to medical oncology for adjvuvant therapy.  [de-identified] : moderately invasive adenocarcinoma  [de-identified] : normal BMs, denies any pain. \par completed 10 days course of Ab. \par no fevers/chills. \par eating frequent meals, lost 12 lbs perioperative \par \par noted loose stools prior to hospitalization. 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

## 2020-09-12 NOTE — HISTORY OF PRESENT ILLNESS
[Disease: _____________________] : Disease: [unfilled] [T: ___] : T[unfilled] [N: ___] : N[unfilled] [M: ___] : M[unfilled] [AJCC Stage: ____] : AJCC Stage: [unfilled] [de-identified] : 45 M with h/o chronic hepatitis B on entecavir presents for further management of resected high risk Stage III (T3/4N2), DAVIE R colon cancer, \par \par Bobby was admitted to [facility] on 8/2/20 with acute onset abdominal pain and fever. CT A/P showed circumferential mural thickening of the ascending colon with adjacent enlarged mesenteric LN. Primary consideration was a neoplasm. A colonoscopy on 8/4/20 showed an obstructing mass in the ascending colon. 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 On Aug 7, 2020, underwent a hemicolectomy, excised portion of small bowel. BCx from admission showed Clostridum septicum but repeat cultures were negative. Completed 2 weeks of antibiotics. Post op course otherwise was unremarkable. \par \par Referred to medical oncology for adjvuvant therapy.  [de-identified] : moderately invasive adenocarcinoma  [de-identified] : normal BMs, denies any pain. \par completed 10 days course of Ab. \par no fevers/chills. \par eating frequent meals, lost 12 lbs perioperative \par \par noted loose stools prior to hospitalization. 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

## 2020-09-15 ENCOUNTER — APPOINTMENT (OUTPATIENT)
Dept: SURGERY | Facility: CLINIC | Age: 45
End: 2020-09-15
Payer: COMMERCIAL

## 2020-09-15 VITALS
HEIGHT: 66 IN | SYSTOLIC BLOOD PRESSURE: 120 MMHG | BODY MASS INDEX: 26.36 KG/M2 | DIASTOLIC BLOOD PRESSURE: 80 MMHG | WEIGHT: 164 LBS | OXYGEN SATURATION: 99 % | HEART RATE: 79 BPM

## 2020-09-15 PROCEDURE — 99024 POSTOP FOLLOW-UP VISIT: CPT

## 2020-09-15 RX ORDER — ENTECAVIR 0.5 MG/1
0.5 TABLET, FILM COATED ORAL
Refills: 0 | Status: COMPLETED | COMMUNITY
Start: 2020-09-12 | End: 2020-09-15

## 2020-09-15 NOTE — DATA REVIEWED
[FreeTextEntry1] : MARY KIM                        1\par Surgical Consult Report\par Final Diagnosis\par Submitting Institution:  [facility]\par Date of Procedure:  8/7/2020\par 1. Right colon and terminal ileum, laparoscopic radial resection\par of right colon and small bowel resection and ileocolostomy (4 H&E\par slides [id number]-; 1F1, 1I1, 1N1, 1W1):\par - Invasive adenocarcinoma, moderately differentiated, see\par note.\par - Perineural invasion is identified.\par - Intermediate grade tumor budding identified.\par - Pathological stage (AJCC 8th edition): pT3N2a.\par Note: The submitted slides show one lymph node involved by\par adenocarcinoma.  Per attached pathology report the following\par reported findings are noted: Tumor site - ascending colon, tumor\par size - 5.5 x 5.2 x 1.5 cm, resection margins uninvolved, five (5)\par out of nineteen (19) lymph nodes involved by\par adenocarcinoma.\par Verified by: Trent Cheung M.D. (Electronic Signature)\par Reported on: 08/17/20 10:10 EDT, [address] [place] [address]\par Phone: [phone]   Fax: [phone]\par _________________________________________________________________

## 2020-09-15 NOTE — PLAN
[FreeTextEntry1] : S/P laparoscopy for right hemicolectomy with small bowel resection.\par Clinically well by this history.\par No evidence by examination to suggest complication.\par Cleared to pursue more maximal exertions and vigorous activity his own comfort as guide.\par Timing and technique of scar massage reviewed again.\par He is pleased with progress to date and I consider him to be stable from my perspective.\par Encouraged to call with questions or concerns at any time.  Otherwise, I will defer management to Heme Onc at this time, though he is welcome to RTO PRN.\par He understands that since he has no GI complaints, no further dietary modifications are required at this point.\par Mr. Mahmood is pleased, agrees and leaves in good spirits.\par I remain available to him, as needed.

## 2020-09-15 NOTE — HISTORY OF PRESENT ILLNESS
[de-identified] : Very pleasant gentleman presenting to office today for surgical follow-up.\par Now s/p recent hospitalization with uneventful anesthesia and uncomplicated right hemicolectomy with small bowel resection for colon Ca.\par He is understandably anxious, but in reasonable spirits overall.\par He admits to some fatigue, but denies malaise.  He notes minimal incisional discomfort, but denies temo pain or more diffuse abdominal discomfort.\par He reports tolerating his regular diet.  He denies change in bowel habit since discharge.  He has no urinary complaints... [de-identified] : Very pleasant gentleman presenting to office for ongoing General Surgery care.\par Now s/p his hospitalization with emergent and involved, but uncomplicated laparoscopic right hemicolectomy and small bowel resection.\par His access port is now in place.\par He has been linked to Medical Oncology with initiation of chemotherapy.\par Mr. Mahmood is pleased with his overall progress to date since the time of our last visit.\par His fatigue is improving despite chemotherapy.\par He has no abdominal pain.\par His post-operative incisional discomfort is resolved...

## 2020-09-15 NOTE — REVIEW OF SYSTEMS
ADULT PROTOCOL: JET AEROSOL ASSESSMENT    Patient  Joce Antunez     45 y.o.   female     7/5/2018  9:08 AM    Breath Sounds Pre Procedure:  Expiratory wheezes      Post Procedure:  Expiratory wheezes                                      Breathing pattern: Pre procedure Breathing Pattern: Tachypneic          Post procedure Breathing Pattern: Tachypneic    Heart Rate: Pre procedure Pulse: 94           Post procedure Pulse: 109    Resp Rate: Pre procedure Respirations: 26           Post procedure Respirations: 24            Cough: Pre procedure  Brown               Post procedure   clear    Oxygen: O2 Device: Nasal cannula   3L         SpO2: Pre procedure SpO2: 95 %                 Post procedure SpO2: 95 %     Nebulizer Therapy: Current medications Aerosolized Medications: Xopenex q4hrs      Changed: No    Smoking History: Former    Problem List:   Patient Active Problem List   Diagnosis Code    Fibromyalgia M79.7    Alpha-1-antitrypsin deficiency ([facility] E88.01    Skin excoriation T14. 8XXA    Tobacco abuse Z72.0    Vasculopathy I99.9    Livedo reticularis without ulceration R23.1    Primary osteoarthritis of both knees M17.0    Acute idiopathic gout of multiple sites M10.09    Coronary artery disease involving native coronary artery of native heart without angina pectoris I25.10    Hypokalemia E87.6    Supplemental oxygen dependent Z99.81    Acute exacerbation of chronic obstructive pulmonary disease (COPD) ([facility]) J44.1    Depression F32.9    Avascular necrosis of bones of both hips ([facility]) M87.051, M87.052    Acute on chronic respiratory failure with hypoxemia ([facility]) J96.21    Acute bronchitis J20.9    COPD with acute exacerbation ([facility]) J44.1    Acute respiratory failure with hypoxia ([facility]) J96.01    COPD exacerbation ([facility] J44.1       Respiratory Therapist: RT Ruben [Feeling Poorly] : not feeling poorly [Feeling Tired] : not feeling tired [Anxiety] : no anxiety [Depression] : no depression [de-identified] : pleased with progress to date since last visit... [Negative] : Heme/Lymph

## 2020-09-15 NOTE — PHYSICAL EXAM
[Normal Rate and Rhythm] : normal rate and rhythm [Respiratory Effort] : normal respiratory effort [No HSM] : no hepatosplenomegaly [Enlarged] : not enlarged [Tender] : was nontender [Alert] : alert [No Rash or Lesion] : No rash or lesion [Oriented to Person] : oriented to person [Calm] : calm [Oriented to Time] : oriented to time [Oriented to Place] : oriented to place [de-identified] : Still normocephalic and atraumatic. [de-identified] : Remains supple with full range of motion. [de-identified] : Appears well and once again in no acute distress. [de-identified] : Deferred. [de-identified] : Deferred. [FreeTextEntry1] : No cervical, supraclavicular, axillary or inguinal adenopathy appreciable on my exam today. [de-identified] : Soft and non tense and nontender today.\par All wounds clean and dry and intact.\par No appreciable drainage or odor.\par No overlying or surrounding inflammatory changes.\par Now minimal post-operative edema with resolving ecchymoses.\par No SQ collections to suggest hematoma, seroma or abscess.\par Entire abdomen remains free of guarding, rebound or referred pain.\par No areas of fascial defect or laxity to suggest incisional or trocar herniation. [de-identified] : Deferred. [de-identified] : Grossly symmetric, within normal limits without motor or sensory deficits.

## 2020-09-16 ENCOUNTER — RESULT REVIEW (OUTPATIENT)
Age: 45
End: 2020-09-16

## 2020-09-16 ENCOUNTER — APPOINTMENT (OUTPATIENT)
Age: 45
End: 2020-09-16
Payer: COMMERCIAL

## 2020-09-16 ENCOUNTER — APPOINTMENT (OUTPATIENT)
Age: 45
End: 2020-09-16

## 2020-09-16 ENCOUNTER — LABORATORY RESULT (OUTPATIENT)
Age: 45
End: 2020-09-16

## 2020-09-16 DIAGNOSIS — R11.2 NAUSEA WITH VOMITING, UNSPECIFIED: ICD-10-CM

## 2020-09-16 DIAGNOSIS — R59.0 LOCALIZED ENLARGED LYMPH NODES: ICD-10-CM

## 2020-09-16 DIAGNOSIS — Z51.11 ENCOUNTER FOR ANTINEOPLASTIC CHEMOTHERAPY: ICD-10-CM

## 2020-09-16 LAB
BASOPHILS # BLD AUTO: 0.02 K/UL — SIGNIFICANT CHANGE UP (ref 0–0.2)
BASOPHILS NFR BLD AUTO: 0.6 % — SIGNIFICANT CHANGE UP (ref 0–2)
EOSINOPHIL # BLD AUTO: 0.08 K/UL — SIGNIFICANT CHANGE UP (ref 0–0.5)
EOSINOPHIL NFR BLD AUTO: 2.3 % — SIGNIFICANT CHANGE UP (ref 0–6)
HCT VFR BLD CALC: 34.5 % — LOW (ref 39–50)
HGB BLD-MCNC: 11.5 G/DL — LOW (ref 13–17)
IMM GRANULOCYTES NFR BLD AUTO: 0.3 % — SIGNIFICANT CHANGE UP (ref 0–1.5)
LYMPHOCYTES # BLD AUTO: 1.45 K/UL — SIGNIFICANT CHANGE UP (ref 1–3.3)
LYMPHOCYTES # BLD AUTO: 41 % — SIGNIFICANT CHANGE UP (ref 13–44)
MCHC RBC-ENTMCNC: 27.9 PG — SIGNIFICANT CHANGE UP (ref 27–34)
MCHC RBC-ENTMCNC: 33.3 G/DL — SIGNIFICANT CHANGE UP (ref 32–36)
MCV RBC AUTO: 83.7 FL — SIGNIFICANT CHANGE UP (ref 80–100)
MONOCYTES # BLD AUTO: 0.34 K/UL — SIGNIFICANT CHANGE UP (ref 0–0.9)
MONOCYTES NFR BLD AUTO: 9.6 % — SIGNIFICANT CHANGE UP (ref 2–14)
NEUTROPHILS # BLD AUTO: 1.64 K/UL — LOW (ref 1.8–7.4)
NEUTROPHILS NFR BLD AUTO: 46.2 % — SIGNIFICANT CHANGE UP (ref 43–77)
NRBC # BLD: 0 /100 WBCS — SIGNIFICANT CHANGE UP (ref 0–0)
PLATELET # BLD AUTO: 250 K/UL — SIGNIFICANT CHANGE UP (ref 150–400)
RBC # BLD: 4.12 M/UL — LOW (ref 4.2–5.8)
RBC # FLD: 13.1 % — SIGNIFICANT CHANGE UP (ref 10.3–14.5)
WBC # BLD: 3.54 K/UL — LOW (ref 3.8–10.5)
WBC # FLD AUTO: 3.54 K/UL — LOW (ref 3.8–10.5)

## 2020-09-16 PROCEDURE — 99213 OFFICE O/P EST LOW 20 MIN: CPT

## 2020-09-18 ENCOUNTER — APPOINTMENT (OUTPATIENT)
Age: 45
End: 2020-09-18

## 2020-09-23 ENCOUNTER — TRANSCRIPTION ENCOUNTER (OUTPATIENT)
Age: 45
End: 2020-09-23

## 2020-09-25 ENCOUNTER — APPOINTMENT (OUTPATIENT)
Dept: HEPATOLOGY | Facility: CLINIC | Age: 45
End: 2020-09-25
Payer: COMMERCIAL

## 2020-09-25 VITALS
HEART RATE: 76 BPM | BODY MASS INDEX: 26.36 KG/M2 | SYSTOLIC BLOOD PRESSURE: 126 MMHG | HEIGHT: 66 IN | TEMPERATURE: 97.6 F | WEIGHT: 164 LBS | DIASTOLIC BLOOD PRESSURE: 87 MMHG | RESPIRATION RATE: 15 BRPM

## 2020-09-25 DIAGNOSIS — I10 ESSENTIAL (PRIMARY) HYPERTENSION: ICD-10-CM

## 2020-09-25 DIAGNOSIS — Z86.19 PERSONAL HISTORY OF OTHER INFECTIOUS AND PARASITIC DISEASES: ICD-10-CM

## 2020-09-25 PROCEDURE — 99204 OFFICE O/P NEW MOD 45 MIN: CPT

## 2020-09-25 NOTE — ASSESSMENT
[FreeTextEntry1] : Mr. KIM is a 45 year old man with chronic hepatitis B, HTN, HLD, recent hospitalization 8/-2-11/2020 with C. 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 HBsAg and HBcAb positive. HBsAb(-)\par - overweight, BMI 26.5 after 12 lbs weight loss since surgery - was likely beneficial for his fatty liver\par - mildly elevated ALT 52 in August 2020\par - normocytic anemia, Hb 11.5, MCV 84\par - likely SHEA - fatty liver seen on CT 8/02/20, elevated ALT likely due to that\par - recent community acquired C. difficile infection \par \par - naive to hepatitis A and C. Can have non-live vaccines\par - wife was vaccinated against hepatitis B\par \par Plan:\par - hepatitis B: continue entecavir\par -                    HCC screening every 6 months - first one now as recent CT not triple phase\par - fibroscan\par - return in 3 months after repeat bloodwork\par - will need colonoscopy after 1 year, 8/2021

## 2020-09-25 NOTE — HISTORY OF PRESENT ILLNESS
[de-identified] : Mr. KIM is a 45 year old man with chronic hepatitis B, on entecavir since about 2008, HTN, HLD, recent hospitalization 8/-2-11/2020 with C. 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 Used to weigh 177 lbs prior to that for many years.\par Alcohol hx: does not drink, never drank heavily. Shx: [place] 8 from [facility]\par \par Workup:\par - 8/2/20 CT abdomen: fatty liver, Circumferential mural thickening of the ascending colon with adjacent enlarged mesenteric lymph nodes. Primary consideration is neoplasm. Colitis is considered less likely.

## 2020-09-25 NOTE — PHYSICAL EXAM
[General Appearance - Alert] : alert [General Appearance - In No Acute Distress] : in no acute distress [Sclera] : the sclera and conjunctiva were normal [PERRL With Normal Accommodation] : pupils were equal in size, round, and reactive to light [Extraocular Movements] : extraocular movements were intact [Outer Ear] : the ears and nose were normal in appearance [Oropharynx] : the oropharynx was normal [Neck Appearance] : the appearance of the neck was normal [Neck Cervical Mass (___cm)] : no neck mass was observed [Jugular Venous Distention Increased] : there was no jugular-venous distention [Thyroid Diffuse Enlargement] : the thyroid was not enlarged [Thyroid Nodule] : there were no palpable thyroid nodules [Auscultation Breath Sounds / Voice Sounds] : lungs were clear to auscultation bilaterally [Heart Rate And Rhythm] : heart rate was normal and rhythm regular [Heart Sounds] : normal S1 and S2 [Heart Sounds Gallop] : no gallops [Murmurs] : no murmurs [Heart Sounds Pericardial Friction Rub] : no pericardial rub [Full Pulse] : the pedal pulses are present [Edema] : there was no peripheral edema [Bowel Sounds] : normal bowel sounds [Abdomen Soft] : soft [Abdomen Tenderness] : non-tender [Abdomen Mass (___ Cm)] : no abdominal mass palpated [Cervical Lymph Nodes Enlarged Posterior Bilaterally] : posterior cervical [Cervical Lymph Nodes Enlarged Anterior Bilaterally] : anterior cervical [Supraclavicular Lymph Nodes Enlarged Bilaterally] : supraclavicular [Axillary Lymph Nodes Enlarged Bilaterally] : axillary [Femoral Lymph Nodes Enlarged Bilaterally] : femoral [Inguinal Lymph Nodes Enlarged Bilaterally] : inguinal [No CVA Tenderness] : no ~M costovertebral angle tenderness [No Spinal Tenderness] : no spinal tenderness [Abnormal Walk] : normal gait [Nail Clubbing] : no clubbing  or cyanosis of the fingernails [Musculoskeletal - Swelling] : no joint swelling seen [Motor Tone] : muscle strength and tone were normal [Skin Color & Pigmentation] : normal skin color and pigmentation [Skin Turgor] : normal skin turgor [] : no rash [Deep Tendon Reflexes (DTR)] : deep tendon reflexes were 2+ and symmetric [Sensation] : the sensory exam was normal to light touch and pinprick [No Focal Deficits] : no focal deficits [Oriented To Time, Place, And Person] : oriented to person, place, and time [Impaired Insight] : insight and judgment were intact [Affect] : the affect was normal [Scleral Icterus] : No Scleral Icterus [Spider Angioma] : No spider angioma(s) were observed [Abdominal  Ascites] : no ascites [Ascites Shifting Dullness] : no shifting dullness of ascites [Splenomegaly] : no splenomegaly [Caput Medusae] : no caput medusae observed [Liver Palpable ___ Finger Breadths Below Costal Margin] : was not palpable below costal margin [Asterixis] : no asterixis observed [Jaundice] : No jaundice [Palmar Erythema] : no Palmar Erythema [Depression] : no depression [FreeTextEntry1] : healing surgical midline scar, nontender

## 2020-09-27 PROBLEM — K21.9 GERD (GASTROESOPHAGEAL REFLUX DISEASE): Status: ACTIVE | Noted: 2020-09-09

## 2020-09-27 NOTE — HISTORY OF PRESENT ILLNESS
[Disease: _____________________] : Disease: [unfilled] [T: ___] : T[unfilled] [N: ___] : N[unfilled] [M: ___] : M[unfilled] [AJCC Stage: ____] : AJCC Stage: [unfilled] [Therapy: ___] : Therapy: [unfilled] [Cycle: ___] : Cycle: [unfilled] [Day: ___] : Day: [unfilled] [de-identified] : 45 M with h/o chronic hepatitis B on entecavir presents for further management of resected high risk Stage III (T3/4N2), DAVIE R colon cancer, \par \par Bobby was admitted to [facility] on 8/2/20 with acute onset abdominal pain and fever. CT A/P showed circumferential mural thickening of the ascending colon with adjacent enlarged mesenteric LN. Primary consideration was a neoplasm. A colonoscopy on 8/4/20 showed an obstructing mass in the ascending colon. 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 On Aug 7, 2020, underwent a hemicolectomy, excised portion of small bowel. BCx from admission showed Clostridum septicum but repeat cultures were negative. Completed 2 weeks of antibiotics. Post op course otherwise was not remarkable. \par \par 9/2/20: C1 FOLFOX  [de-identified] : moderately invasive adenocarcinoma  [de-identified] : Some nausea, and vomiting. Did not take any antiemetic till after vomiting. \par noted a small bump in L axilla, non tender, noted it on Saturday. No fevers. 1 episode of sweating at night. Good energy levels. \par 1 loose BMs yesterday. \par no significant neuropathy\par + hiccups that resolved with reglan

## 2020-09-30 ENCOUNTER — RESULT REVIEW (OUTPATIENT)
Age: 45
End: 2020-09-30

## 2020-09-30 ENCOUNTER — APPOINTMENT (OUTPATIENT)
Age: 45
End: 2020-09-30

## 2020-09-30 ENCOUNTER — LABORATORY RESULT (OUTPATIENT)
Age: 45
End: 2020-09-30

## 2020-09-30 ENCOUNTER — APPOINTMENT (OUTPATIENT)
Age: 45
End: 2020-09-30
Payer: COMMERCIAL

## 2020-09-30 VITALS
RESPIRATION RATE: 18 BRPM | HEART RATE: 76 BPM | DIASTOLIC BLOOD PRESSURE: 92 MMHG | SYSTOLIC BLOOD PRESSURE: 144 MMHG | TEMPERATURE: 97.7 F

## 2020-09-30 DIAGNOSIS — R10.9 UNSPECIFIED ABDOMINAL PAIN: ICD-10-CM

## 2020-09-30 PROBLEM — R59.0 LYMPHADENOPATHY, AXILLARY: Status: RESOLVED | Noted: 2020-09-27 | Resolved: 2020-09-30

## 2020-09-30 LAB
BASOPHILS # BLD AUTO: 0.02 K/UL — SIGNIFICANT CHANGE UP (ref 0–0.2)
BASOPHILS NFR BLD AUTO: 0.7 % — SIGNIFICANT CHANGE UP (ref 0–2)
EOSINOPHIL # BLD AUTO: 0.06 K/UL — SIGNIFICANT CHANGE UP (ref 0–0.5)
EOSINOPHIL NFR BLD AUTO: 2.1 % — SIGNIFICANT CHANGE UP (ref 0–6)
HCT VFR BLD CALC: 34.5 % — LOW (ref 39–50)
HGB BLD-MCNC: 11.6 G/DL — LOW (ref 13–17)
IMM GRANULOCYTES NFR BLD AUTO: 0.3 % — SIGNIFICANT CHANGE UP (ref 0–1.5)
LYMPHOCYTES # BLD AUTO: 1.23 K/UL — SIGNIFICANT CHANGE UP (ref 1–3.3)
LYMPHOCYTES # BLD AUTO: 42.7 % — SIGNIFICANT CHANGE UP (ref 13–44)
MCHC RBC-ENTMCNC: 27.8 PG — SIGNIFICANT CHANGE UP (ref 27–34)
MCHC RBC-ENTMCNC: 33.6 G/DL — SIGNIFICANT CHANGE UP (ref 32–36)
MCV RBC AUTO: 82.5 FL — SIGNIFICANT CHANGE UP (ref 80–100)
MONOCYTES # BLD AUTO: 0.31 K/UL — SIGNIFICANT CHANGE UP (ref 0–0.9)
MONOCYTES NFR BLD AUTO: 10.8 % — SIGNIFICANT CHANGE UP (ref 2–14)
NEUTROPHILS # BLD AUTO: 1.25 K/UL — LOW (ref 1.8–7.4)
NEUTROPHILS NFR BLD AUTO: 43.4 % — SIGNIFICANT CHANGE UP (ref 43–77)
NRBC # BLD: 0 /100 WBCS — SIGNIFICANT CHANGE UP (ref 0–0)
PLATELET # BLD AUTO: 263 K/UL — SIGNIFICANT CHANGE UP (ref 150–400)
RBC # BLD: 4.18 M/UL — LOW (ref 4.2–5.8)
RBC # FLD: 13.4 % — SIGNIFICANT CHANGE UP (ref 10.3–14.5)
WBC # BLD: 2.88 K/UL — LOW (ref 3.8–10.5)
WBC # FLD AUTO: 2.88 K/UL — LOW (ref 3.8–10.5)

## 2020-09-30 PROCEDURE — 99213 OFFICE O/P EST LOW 20 MIN: CPT

## 2020-09-30 NOTE — HISTORY OF PRESENT ILLNESS
[N: ___] : N[unfilled] [Disease: _____________________] : Disease: [unfilled] [T: ___] : T[unfilled] [AJCC Stage: ____] : AJCC Stage: [unfilled] [M: ___] : M[unfilled] [Therapy: ___] : Therapy: [unfilled] [Cycle: ___] : Cycle: [unfilled] [Day: ___] : Day: [unfilled] [de-identified] : 45 M with h/o chronic hepatitis B on entecavir presents for further management of resected high risk Stage III (T3/4N2), DAVIE R colon cancer, \par \par Bobby was admitted to [facility] on 8/2/20 with acute onset abdominal pain and fever. CT A/P showed circumferential mural thickening of the ascending colon with adjacent enlarged mesenteric LN. Primary consideration was a neoplasm. A colonoscopy on 8/4/20 showed an obstructing mass in the ascending colon. 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 On Aug 7, 2020, underwent a hemicolectomy, excised portion of small bowel. BCx from admission showed Clostridum septicum but repeat cultures were negative. Completed 2 weeks of antibiotics. Post op course otherwise was unremarkable. \par \par Referred to medical oncology for adjuvant therapy. \par \par 8/24/20: CT Chest: 4 mm LL nodule, 3 mos scan f/u to determine stability \par 9/2/20:C1\par 9/16/20: C2\par 9/30/20: C3 [de-identified] : moderately invasive adenocarcinoma  [de-identified] : abdominal cramps resolved, bentyl helped. Normal BMs. Energy level is stable, remaining active. Appetite is good. Weight is stable. \par Denies any pain. \par axillary LN resolved \par No n/v\par Neuropathy occasionally in feet, but resolves\par has been doing acupuncture \par had apt with hepatology

## 2020-09-30 NOTE — REASON FOR VISIT
[Follow-Up Visit] : a follow-up [Spouse] : spouse [FreeTextEntry2] : Stage III colon cancer on adjuvant FOLFOX

## 2020-10-02 ENCOUNTER — APPOINTMENT (OUTPATIENT)
Age: 45
End: 2020-10-02

## 2020-10-04 DIAGNOSIS — Z51.89 ENCOUNTER FOR OTHER SPECIFIED AFTERCARE: ICD-10-CM

## 2020-10-05 ENCOUNTER — OUTPATIENT (OUTPATIENT)
Dept: OUTPATIENT SERVICES | Facility: HOSPITAL | Age: 45
LOS: 1 days | End: 2020-10-05
Payer: COMMERCIAL

## 2020-10-05 ENCOUNTER — APPOINTMENT (OUTPATIENT)
Dept: ULTRASOUND IMAGING | Facility: CLINIC | Age: 45
End: 2020-10-05
Payer: COMMERCIAL

## 2020-10-05 DIAGNOSIS — Z90.49 ACQUIRED ABSENCE OF OTHER SPECIFIED PARTS OF DIGESTIVE TRACT: Chronic | ICD-10-CM

## 2020-10-05 DIAGNOSIS — Z00.8 ENCOUNTER FOR OTHER GENERAL EXAMINATION: ICD-10-CM

## 2020-10-05 PROCEDURE — 76700 US EXAM ABDOM COMPLETE: CPT | Mod: 26

## 2020-10-05 PROCEDURE — 76700 US EXAM ABDOM COMPLETE: CPT

## 2020-10-09 NOTE — PHYSICAL EXAM
[Fully active, able to carry on all pre-disease performance without restriction] : Status 0 - Fully active, able to carry on all pre-disease performance without restriction [Normal] : affect appropriate [de-identified] : left axillary mass non tender movable small < 5 mm

## 2020-10-09 NOTE — REVIEW OF SYSTEMS
[Fatigue] : fatigue [Vomiting] : vomiting [Diarrhea] : diarrhea [Negative] : Allergic/Immunologic [FreeTextEntry7] : see interval hx

## 2020-10-09 NOTE — HISTORY OF PRESENT ILLNESS
[Disease: _____________________] : Disease: [unfilled] [T: ___] : T[unfilled] [N: ___] : N[unfilled] [M: ___] : M[unfilled] [AJCC Stage: ____] : AJCC Stage: [unfilled] [Therapy: ___] : Therapy: [unfilled] [Cycle: ___] : Cycle: [unfilled] [Day: ___] : Day: [unfilled] [de-identified] : 45 M with h/o chronic hepatitis B on entecavir presents for further management of resected high risk Stage III (T3/4N2), DAVIE R colon cancer, \par \par Bobby was admitted to [facility] on 8/2/20 with acute onset abdominal pain and fever. CT A/P showed circumferential mural thickening of the ascending colon with adjacent enlarged mesenteric LN. Primary consideration was a neoplasm. A colonoscopy on 8/4/20 showed an obstructing mass in the ascending colon. 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 On Aug 7, 2020, underwent a hemicolectomy, excised portion of small bowel. BCx from admission showed Clostridum septicum but repeat cultures were negative. Completed 2 weeks of antibiotics. Post op course otherwise was not remarkable. \par \par 9/2/20: C1 FOLFOX \par 9/16/20 C2 FOLFOX [de-identified] : moderately invasive adenocarcinoma  [de-identified] : N/V after first cycle resolved with antiemetic. + fatigue\par Had cramping loose bm x 2-3 episodes 2 days ago.  He feels this was related to taking a laxative.\par Resolved with low residue diet, pushing fluids and took antidiarrheal x1\par \par His left axillary lump feels smaller and non tender.\par

## 2020-10-10 ENCOUNTER — OUTPATIENT (OUTPATIENT)
Dept: OUTPATIENT SERVICES | Facility: HOSPITAL | Age: 45
LOS: 1 days | Discharge: HOME | End: 2020-10-10

## 2020-10-10 DIAGNOSIS — C18.9 MALIGNANT NEOPLASM OF COLON, UNSPECIFIED: ICD-10-CM

## 2020-10-10 DIAGNOSIS — Z90.49 ACQUIRED ABSENCE OF OTHER SPECIFIED PARTS OF DIGESTIVE TRACT: Chronic | ICD-10-CM

## 2020-10-14 ENCOUNTER — APPOINTMENT (OUTPATIENT)
Dept: HEMATOLOGY ONCOLOGY | Facility: CLINIC | Age: 45
End: 2020-10-14
Payer: COMMERCIAL

## 2020-10-14 ENCOUNTER — LABORATORY RESULT (OUTPATIENT)
Age: 45
End: 2020-10-14

## 2020-10-14 ENCOUNTER — RESULT REVIEW (OUTPATIENT)
Age: 45
End: 2020-10-14

## 2020-10-14 ENCOUNTER — APPOINTMENT (OUTPATIENT)
Age: 45
End: 2020-10-14

## 2020-10-14 VITALS
OXYGEN SATURATION: 100 % | RESPIRATION RATE: 18 BRPM | DIASTOLIC BLOOD PRESSURE: 89 MMHG | SYSTOLIC BLOOD PRESSURE: 129 MMHG | HEART RATE: 69 BPM

## 2020-10-14 LAB
BASOPHILS # BLD AUTO: 0.05 K/UL — SIGNIFICANT CHANGE UP (ref 0–0.2)
BASOPHILS NFR BLD AUTO: 0.5 % — SIGNIFICANT CHANGE UP (ref 0–2)
EOSINOPHIL # BLD AUTO: 0.09 K/UL — SIGNIFICANT CHANGE UP (ref 0–0.5)
EOSINOPHIL NFR BLD AUTO: 0.9 % — SIGNIFICANT CHANGE UP (ref 0–6)
HCT VFR BLD CALC: 35.5 % — LOW (ref 39–50)
HGB BLD-MCNC: 12.1 G/DL — LOW (ref 13–17)
IMM GRANULOCYTES NFR BLD AUTO: 4.9 % — HIGH (ref 0–1.5)
LYMPHOCYTES # BLD AUTO: 1.99 K/UL — SIGNIFICANT CHANGE UP (ref 1–3.3)
LYMPHOCYTES # BLD AUTO: 19.2 % — SIGNIFICANT CHANGE UP (ref 13–44)
MCHC RBC-ENTMCNC: 27.9 PG — SIGNIFICANT CHANGE UP (ref 27–34)
MCHC RBC-ENTMCNC: 34.1 G/DL — SIGNIFICANT CHANGE UP (ref 32–36)
MCV RBC AUTO: 81.8 FL — SIGNIFICANT CHANGE UP (ref 80–100)
MONOCYTES # BLD AUTO: 0.67 K/UL — SIGNIFICANT CHANGE UP (ref 0–0.9)
MONOCYTES NFR BLD AUTO: 6.5 % — SIGNIFICANT CHANGE UP (ref 2–14)
NEUTROPHILS # BLD AUTO: 7.05 K/UL — SIGNIFICANT CHANGE UP (ref 1.8–7.4)
NEUTROPHILS NFR BLD AUTO: 68 % — SIGNIFICANT CHANGE UP (ref 43–77)
NRBC # BLD: 0 /100 WBCS — SIGNIFICANT CHANGE UP (ref 0–0)
PLATELET # BLD AUTO: 145 K/UL — LOW (ref 150–400)
RBC # BLD: 4.34 M/UL — SIGNIFICANT CHANGE UP (ref 4.2–5.8)
RBC # FLD: 14 % — SIGNIFICANT CHANGE UP (ref 10.3–14.5)
WBC # BLD: 10.36 K/UL — SIGNIFICANT CHANGE UP (ref 3.8–10.5)
WBC # FLD AUTO: 10.36 K/UL — SIGNIFICANT CHANGE UP (ref 3.8–10.5)

## 2020-10-14 PROCEDURE — 99213 OFFICE O/P EST LOW 20 MIN: CPT

## 2020-10-14 RX ORDER — ENTECAVIR 0.5 MG/1
1 TABLET ORAL
Qty: 0 | Refills: 0 | DISCHARGE

## 2020-10-15 DIAGNOSIS — R11.2 NAUSEA WITH VOMITING, UNSPECIFIED: ICD-10-CM

## 2020-10-15 DIAGNOSIS — Z51.11 ENCOUNTER FOR ANTINEOPLASTIC CHEMOTHERAPY: ICD-10-CM

## 2020-10-16 ENCOUNTER — APPOINTMENT (OUTPATIENT)
Age: 45
End: 2020-10-16

## 2020-10-16 DIAGNOSIS — Z51.89 ENCOUNTER FOR OTHER SPECIFIED AFTERCARE: ICD-10-CM

## 2020-10-28 ENCOUNTER — RESULT REVIEW (OUTPATIENT)
Age: 45
End: 2020-10-28

## 2020-10-28 ENCOUNTER — APPOINTMENT (OUTPATIENT)
Age: 45
End: 2020-10-28

## 2020-10-28 ENCOUNTER — LABORATORY RESULT (OUTPATIENT)
Age: 45
End: 2020-10-28

## 2020-10-28 LAB
ANISOCYTOSIS BLD QL: SLIGHT — SIGNIFICANT CHANGE UP
BASOPHILS # BLD AUTO: 0 K/UL — SIGNIFICANT CHANGE UP (ref 0–0.2)
BASOPHILS NFR BLD AUTO: 0 % — SIGNIFICANT CHANGE UP (ref 0–2)
EOSINOPHIL # BLD AUTO: 0.19 K/UL — SIGNIFICANT CHANGE UP (ref 0–0.5)
EOSINOPHIL NFR BLD AUTO: 2 % — SIGNIFICANT CHANGE UP (ref 0–6)
HCT VFR BLD CALC: 35.5 % — LOW (ref 39–50)
HGB BLD-MCNC: 11.9 G/DL — LOW (ref 13–17)
LYMPHOCYTES # BLD AUTO: 1.36 K/UL — SIGNIFICANT CHANGE UP (ref 1–3.3)
LYMPHOCYTES # BLD AUTO: 14 % — SIGNIFICANT CHANGE UP (ref 13–44)
MCHC RBC-ENTMCNC: 27.7 PG — SIGNIFICANT CHANGE UP (ref 27–34)
MCHC RBC-ENTMCNC: 33.5 G/DL — SIGNIFICANT CHANGE UP (ref 32–36)
MCV RBC AUTO: 82.8 FL — SIGNIFICANT CHANGE UP (ref 80–100)
METAMYELOCYTES # FLD: 1 % — HIGH (ref 0–0)
MONOCYTES # BLD AUTO: 0.39 K/UL — SIGNIFICANT CHANGE UP (ref 0–0.9)
MONOCYTES NFR BLD AUTO: 4 % — SIGNIFICANT CHANGE UP (ref 2–14)
MYELOCYTES NFR BLD: 1 % — HIGH (ref 0–0)
NEUTROPHILS # BLD AUTO: 7.58 K/UL — HIGH (ref 1.8–7.4)
NEUTROPHILS NFR BLD AUTO: 77 % — SIGNIFICANT CHANGE UP (ref 43–77)
NEUTS BAND # BLD: 1 % — SIGNIFICANT CHANGE UP (ref 0–8)
NRBC # BLD: 0 /100 — SIGNIFICANT CHANGE UP (ref 0–0)
NRBC # BLD: SIGNIFICANT CHANGE UP /100 WBCS (ref 0–0)
PLAT MORPH BLD: NORMAL — SIGNIFICANT CHANGE UP
PLATELET # BLD AUTO: 160 K/UL — SIGNIFICANT CHANGE UP (ref 150–400)
POIKILOCYTOSIS BLD QL AUTO: SLIGHT — SIGNIFICANT CHANGE UP
RBC # BLD: 4.29 M/UL — SIGNIFICANT CHANGE UP (ref 4.2–5.8)
RBC # FLD: 15.7 % — HIGH (ref 10.3–14.5)
RBC BLD AUTO: SIGNIFICANT CHANGE UP
WBC # BLD: 9.72 K/UL — SIGNIFICANT CHANGE UP (ref 3.8–10.5)
WBC # FLD AUTO: 9.72 K/UL — SIGNIFICANT CHANGE UP (ref 3.8–10.5)

## 2020-10-30 ENCOUNTER — APPOINTMENT (OUTPATIENT)
Age: 45
End: 2020-10-30

## 2020-11-03 NOTE — HISTORY OF PRESENT ILLNESS
[Disease: _____________________] : Disease: [unfilled] [T: ___] : T[unfilled] [N: ___] : N[unfilled] [M: ___] : M[unfilled] [AJCC Stage: ____] : AJCC Stage: [unfilled] [Therapy: ___] : Therapy: [unfilled] [Cycle: ___] : Cycle: [unfilled] [Day: ___] : Day: [unfilled] [de-identified] : 45 M with h/o chronic hepatitis B on entecavir presents for further management of resected high risk Stage III (T3/4N2), DAVIE R colon cancer, \par \par Bobby was admitted to [facility] on 8/2/20 with acute onset abdominal pain and fever. CT A/P showed circumferential mural thickening of the ascending colon with adjacent enlarged mesenteric LN. Primary consideration was a neoplasm. A colonoscopy on 8/4/20 showed an obstructing mass in the ascending colon. 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 On Aug 7, 2020, underwent a hemicolectomy, excised portion of small bowel. BCx from admission showed Clostridum septicum but repeat cultures were negative. Completed 2 weeks of antibiotics. Post op course otherwise was unremarkable. \par \par Referred to medical oncology for adjuvant therapy. \par \par 8/24/20: CT Chest: 4 mm LL nodule, 3 mos scan f/u to determine stability \par 9/2/20:C1\par 9/16/20: C2\par 9/30/20: C3\par 10/14/20 C4\par  [de-identified] : moderately invasive adenocarcinoma  [de-identified] : No nausea during this cycle;  bone pain after Onpro manageable with tylenol.  No neuropathy.

## 2020-11-05 ENCOUNTER — OUTPATIENT (OUTPATIENT)
Dept: OUTPATIENT SERVICES | Facility: HOSPITAL | Age: 45
LOS: 1 days | Discharge: HOME | End: 2020-11-05
Payer: COMMERCIAL

## 2020-11-05 DIAGNOSIS — Z90.49 ACQUIRED ABSENCE OF OTHER SPECIFIED PARTS OF DIGESTIVE TRACT: Chronic | ICD-10-CM

## 2020-11-05 DIAGNOSIS — C18.9 MALIGNANT NEOPLASM OF COLON, UNSPECIFIED: ICD-10-CM

## 2020-11-11 ENCOUNTER — RESULT REVIEW (OUTPATIENT)
Age: 45
End: 2020-11-11

## 2020-11-11 ENCOUNTER — APPOINTMENT (OUTPATIENT)
Age: 45
End: 2020-11-11
Payer: COMMERCIAL

## 2020-11-11 ENCOUNTER — LABORATORY RESULT (OUTPATIENT)
Age: 45
End: 2020-11-11

## 2020-11-11 ENCOUNTER — APPOINTMENT (OUTPATIENT)
Age: 45
End: 2020-11-11

## 2020-11-11 DIAGNOSIS — Z51.11 ENCOUNTER FOR ANTINEOPLASTIC CHEMOTHERAPY: ICD-10-CM

## 2020-11-11 DIAGNOSIS — R11.2 NAUSEA WITH VOMITING, UNSPECIFIED: ICD-10-CM

## 2020-11-11 DIAGNOSIS — E86.0 DEHYDRATION: ICD-10-CM

## 2020-11-11 LAB
BASOPHILS # BLD AUTO: 0.12 K/UL — SIGNIFICANT CHANGE UP (ref 0–0.2)
BASOPHILS NFR BLD AUTO: 1 % — SIGNIFICANT CHANGE UP (ref 0–2)
EOSINOPHIL # BLD AUTO: 0 K/UL — SIGNIFICANT CHANGE UP (ref 0–0.5)
EOSINOPHIL NFR BLD AUTO: 0 % — SIGNIFICANT CHANGE UP (ref 0–6)
HCT VFR BLD CALC: 35.7 % — LOW (ref 39–50)
HGB BLD-MCNC: 11.7 G/DL — LOW (ref 13–17)
LYMPHOCYTES # BLD AUTO: 1.64 K/UL — SIGNIFICANT CHANGE UP (ref 1–3.3)
LYMPHOCYTES # BLD AUTO: 14 % — SIGNIFICANT CHANGE UP (ref 13–44)
MCHC RBC-ENTMCNC: 28 PG — SIGNIFICANT CHANGE UP (ref 27–34)
MCHC RBC-ENTMCNC: 32.8 G/DL — SIGNIFICANT CHANGE UP (ref 32–36)
MCV RBC AUTO: 85.4 FL — SIGNIFICANT CHANGE UP (ref 80–100)
MONOCYTES # BLD AUTO: 0.35 K/UL — SIGNIFICANT CHANGE UP (ref 0–0.9)
MONOCYTES NFR BLD AUTO: 3 % — SIGNIFICANT CHANGE UP (ref 2–14)
NEUTROPHILS # BLD AUTO: 9.58 K/UL — HIGH (ref 1.8–7.4)
NEUTROPHILS NFR BLD AUTO: 82 % — HIGH (ref 43–77)
NRBC # BLD: 0 /100 — SIGNIFICANT CHANGE UP (ref 0–0)
NRBC # BLD: SIGNIFICANT CHANGE UP /100 WBCS (ref 0–0)
PLAT MORPH BLD: NORMAL — SIGNIFICANT CHANGE UP
PLATELET # BLD AUTO: 185 K/UL — SIGNIFICANT CHANGE UP (ref 150–400)
RBC # BLD: 4.18 M/UL — LOW (ref 4.2–5.8)
RBC # FLD: 16.6 % — HIGH (ref 10.3–14.5)
RBC BLD AUTO: SIGNIFICANT CHANGE UP
WBC # BLD: 11.68 K/UL — HIGH (ref 3.8–10.5)
WBC # FLD AUTO: 11.68 K/UL — HIGH (ref 3.8–10.5)

## 2020-11-11 PROCEDURE — 93010 ELECTROCARDIOGRAM REPORT: CPT

## 2020-11-11 PROCEDURE — 99214 OFFICE O/P EST MOD 30 MIN: CPT

## 2020-11-13 ENCOUNTER — APPOINTMENT (OUTPATIENT)
Age: 45
End: 2020-11-13

## 2020-11-14 ENCOUNTER — OUTPATIENT (OUTPATIENT)
Dept: OUTPATIENT SERVICES | Facility: HOSPITAL | Age: 45
LOS: 1 days | End: 2020-11-14
Payer: COMMERCIAL

## 2020-11-14 ENCOUNTER — APPOINTMENT (OUTPATIENT)
Dept: CT IMAGING | Facility: CLINIC | Age: 45
End: 2020-11-14
Payer: COMMERCIAL

## 2020-11-14 DIAGNOSIS — Z51.89 ENCOUNTER FOR OTHER SPECIFIED AFTERCARE: ICD-10-CM

## 2020-11-14 DIAGNOSIS — Z90.49 ACQUIRED ABSENCE OF OTHER SPECIFIED PARTS OF DIGESTIVE TRACT: Chronic | ICD-10-CM

## 2020-11-14 DIAGNOSIS — C18.9 MALIGNANT NEOPLASM OF COLON, UNSPECIFIED: ICD-10-CM

## 2020-11-14 PROCEDURE — 71260 CT THORAX DX C+: CPT

## 2020-11-14 PROCEDURE — 71260 CT THORAX DX C+: CPT | Mod: 26

## 2020-11-14 PROCEDURE — 74177 CT ABD & PELVIS W/CONTRAST: CPT

## 2020-11-14 PROCEDURE — 74177 CT ABD & PELVIS W/CONTRAST: CPT | Mod: 26

## 2020-11-25 ENCOUNTER — APPOINTMENT (OUTPATIENT)
Age: 45
End: 2020-11-25
Payer: COMMERCIAL

## 2020-11-25 ENCOUNTER — APPOINTMENT (OUTPATIENT)
Age: 45
End: 2020-11-25

## 2020-11-25 ENCOUNTER — RESULT REVIEW (OUTPATIENT)
Age: 45
End: 2020-11-25

## 2020-11-25 ENCOUNTER — LABORATORY RESULT (OUTPATIENT)
Age: 45
End: 2020-11-25

## 2020-11-25 DIAGNOSIS — T45.1X5A NAUSEA WITH VOMITING, UNSPECIFIED: ICD-10-CM

## 2020-11-25 DIAGNOSIS — R11.2 NAUSEA WITH VOMITING, UNSPECIFIED: ICD-10-CM

## 2020-11-25 LAB
BASOPHILS # BLD AUTO: 0 K/UL — SIGNIFICANT CHANGE UP (ref 0–0.2)
BASOPHILS NFR BLD AUTO: 0 % — SIGNIFICANT CHANGE UP (ref 0–2)
EOSINOPHIL # BLD AUTO: 0 K/UL — SIGNIFICANT CHANGE UP (ref 0–0.5)
EOSINOPHIL NFR BLD AUTO: 0 % — SIGNIFICANT CHANGE UP (ref 0–6)
HCT VFR BLD CALC: 34.2 % — LOW (ref 39–50)
HGB BLD-MCNC: 11.3 G/DL — LOW (ref 13–17)
LYMPHOCYTES # BLD AUTO: 1.43 K/UL — SIGNIFICANT CHANGE UP (ref 1–3.3)
LYMPHOCYTES # BLD AUTO: 11 % — LOW (ref 13–44)
MCHC RBC-ENTMCNC: 28.3 PG — SIGNIFICANT CHANGE UP (ref 27–34)
MCHC RBC-ENTMCNC: 33 G/DL — SIGNIFICANT CHANGE UP (ref 32–36)
MCV RBC AUTO: 85.5 FL — SIGNIFICANT CHANGE UP (ref 80–100)
MONOCYTES # BLD AUTO: 1.04 K/UL — HIGH (ref 0–0.9)
MONOCYTES NFR BLD AUTO: 8 % — SIGNIFICANT CHANGE UP (ref 2–14)
NEUTROPHILS # BLD AUTO: 10.51 K/UL — HIGH (ref 1.8–7.4)
NEUTROPHILS NFR BLD AUTO: 81 % — HIGH (ref 43–77)
NRBC # BLD: 0 /100 — SIGNIFICANT CHANGE UP (ref 0–0)
NRBC # BLD: SIGNIFICANT CHANGE UP /100 WBCS (ref 0–0)
PLAT MORPH BLD: NORMAL — SIGNIFICANT CHANGE UP
PLATELET # BLD AUTO: 176 K/UL — SIGNIFICANT CHANGE UP (ref 150–400)
RBC # BLD: 4 M/UL — LOW (ref 4.2–5.8)
RBC # FLD: 17.7 % — HIGH (ref 10.3–14.5)
RBC BLD AUTO: SIGNIFICANT CHANGE UP
WBC # BLD: 12.98 K/UL — HIGH (ref 3.8–10.5)
WBC # FLD AUTO: 12.98 K/UL — HIGH (ref 3.8–10.5)

## 2020-11-25 PROCEDURE — 99214 OFFICE O/P EST MOD 30 MIN: CPT

## 2020-11-25 PROCEDURE — 99072 ADDL SUPL MATRL&STAF TM PHE: CPT

## 2020-11-27 ENCOUNTER — APPOINTMENT (OUTPATIENT)
Age: 45
End: 2020-11-27

## 2020-11-27 ENCOUNTER — NON-APPOINTMENT (OUTPATIENT)
Age: 45
End: 2020-11-27

## 2020-11-30 NOTE — HISTORY OF PRESENT ILLNESS
[Disease: _____________________] : Disease: [unfilled] [T: ___] : T[unfilled] [N: ___] : N[unfilled] [M: ___] : M[unfilled] [AJCC Stage: ____] : AJCC Stage: [unfilled] [Therapy: ___] : Therapy: [unfilled] [Cycle: ___] : Cycle: [unfilled] [Day: ___] : Day: [unfilled] [de-identified] : 45 M with h/o chronic hepatitis B on entecavir presents for further management of resected high risk Stage III (T3/4N2), DAVIE R colon cancer, \par \par Bobby was admitted to [facility] on 8/2/20 with acute onset abdominal pain and fever. CT A/P showed circumferential mural thickening of the ascending colon with adjacent enlarged mesenteric LN. Primary consideration was a neoplasm. A colonoscopy on 8/4/20 showed an obstructing mass in the ascending colon. 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 On Aug 7, 2020, underwent a hemicolectomy, excised portion of small bowel. BCx from admission showed Clostridum septicum but repeat cultures were negative. Completed 2 weeks of antibiotics. Post op course otherwise was unremarkable. \par \par Referred to medical oncology for adjuvant therapy. \par \par 8/24/20: CT Chest: 4 mm LL nodule, 3 mos scan f/u to determine stability \par 9/2/20:C1\par 9/16/20: C2\par 9/30/20: C3\par 10/14/20 C4\par 11/11/20 C5 hypersensitiviyty Oxaliplatin\par  [de-identified] : moderately invasive adenocarcinoma  [de-identified] : Thirty minutes into the Oxaliplatin infusion, patient c/o nausea, sweating, oxaliplatin held, BP 76-80/60's HR 93; pulse ox 100 %. Electronic and manual chair reclined, IVF 1500 ml total given over the next hour.  Given pepcid, symptoms resolved. \par IVF 1500 ml total given. Infusion held until  systolic ; restarted at 1/2 rate and tolerated without recurrence of symptoms.\par \par

## 2020-12-03 ENCOUNTER — OUTPATIENT (OUTPATIENT)
Dept: OUTPATIENT SERVICES | Facility: HOSPITAL | Age: 45
LOS: 1 days | Discharge: HOME | End: 2020-12-03

## 2020-12-03 DIAGNOSIS — C18.9 MALIGNANT NEOPLASM OF COLON, UNSPECIFIED: ICD-10-CM

## 2020-12-03 DIAGNOSIS — Z90.49 ACQUIRED ABSENCE OF OTHER SPECIFIED PARTS OF DIGESTIVE TRACT: Chronic | ICD-10-CM

## 2020-12-07 ENCOUNTER — APPOINTMENT (OUTPATIENT)
Age: 45
End: 2020-12-07

## 2020-12-09 ENCOUNTER — APPOINTMENT (OUTPATIENT)
Age: 45
End: 2020-12-09

## 2020-12-09 ENCOUNTER — RESULT REVIEW (OUTPATIENT)
Age: 45
End: 2020-12-09

## 2020-12-09 ENCOUNTER — LABORATORY RESULT (OUTPATIENT)
Age: 45
End: 2020-12-09

## 2020-12-09 ENCOUNTER — APPOINTMENT (OUTPATIENT)
Age: 45
End: 2020-12-09
Payer: COMMERCIAL

## 2020-12-09 VITALS
HEART RATE: 73 BPM | RESPIRATION RATE: 17 BRPM | DIASTOLIC BLOOD PRESSURE: 92 MMHG | SYSTOLIC BLOOD PRESSURE: 130 MMHG | TEMPERATURE: 97.7 F

## 2020-12-09 DIAGNOSIS — E86.0 DEHYDRATION: ICD-10-CM

## 2020-12-09 DIAGNOSIS — Z51.11 ENCOUNTER FOR ANTINEOPLASTIC CHEMOTHERAPY: ICD-10-CM

## 2020-12-09 DIAGNOSIS — R11.2 NAUSEA WITH VOMITING, UNSPECIFIED: ICD-10-CM

## 2020-12-09 LAB
BASOPHILS # BLD AUTO: 0.05 K/UL — SIGNIFICANT CHANGE UP (ref 0–0.2)
BASOPHILS NFR BLD AUTO: 0.6 % — SIGNIFICANT CHANGE UP (ref 0–2)
EOSINOPHIL # BLD AUTO: 0.08 K/UL — SIGNIFICANT CHANGE UP (ref 0–0.5)
EOSINOPHIL NFR BLD AUTO: 0.9 % — SIGNIFICANT CHANGE UP (ref 0–6)
HCT VFR BLD CALC: 35.7 % — LOW (ref 39–50)
HGB BLD-MCNC: 11.5 G/DL — LOW (ref 13–17)
IMM GRANULOCYTES NFR BLD AUTO: 4.9 % — HIGH (ref 0–1.5)
LYMPHOCYTES # BLD AUTO: 1.35 K/UL — SIGNIFICANT CHANGE UP (ref 1–3.3)
LYMPHOCYTES # BLD AUTO: 15.1 % — SIGNIFICANT CHANGE UP (ref 13–44)
MCHC RBC-ENTMCNC: 28 PG — SIGNIFICANT CHANGE UP (ref 27–34)
MCHC RBC-ENTMCNC: 32.2 G/DL — SIGNIFICANT CHANGE UP (ref 32–36)
MCV RBC AUTO: 87.1 FL — SIGNIFICANT CHANGE UP (ref 80–100)
MONOCYTES # BLD AUTO: 0.8 K/UL — SIGNIFICANT CHANGE UP (ref 0–0.9)
MONOCYTES NFR BLD AUTO: 8.9 % — SIGNIFICANT CHANGE UP (ref 2–14)
NEUTROPHILS # BLD AUTO: 6.22 K/UL — SIGNIFICANT CHANGE UP (ref 1.8–7.4)
NEUTROPHILS NFR BLD AUTO: 69.6 % — SIGNIFICANT CHANGE UP (ref 43–77)
NRBC # BLD: 0 /100 WBCS — SIGNIFICANT CHANGE UP (ref 0–0)
PLATELET # BLD AUTO: 181 K/UL — SIGNIFICANT CHANGE UP (ref 150–400)
RBC # BLD: 4.1 M/UL — LOW (ref 4.2–5.8)
RBC # FLD: 17.8 % — HIGH (ref 10.3–14.5)
WBC # BLD: 8.94 K/UL — SIGNIFICANT CHANGE UP (ref 3.8–10.5)
WBC # FLD AUTO: 8.94 K/UL — SIGNIFICANT CHANGE UP (ref 3.8–10.5)

## 2020-12-09 PROCEDURE — 99213 OFFICE O/P EST LOW 20 MIN: CPT

## 2020-12-11 ENCOUNTER — APPOINTMENT (OUTPATIENT)
Age: 45
End: 2020-12-11

## 2020-12-12 DIAGNOSIS — Z51.89 ENCOUNTER FOR OTHER SPECIFIED AFTERCARE: ICD-10-CM

## 2020-12-21 LAB — AFP-TM SERPL-MCNC: 4 NG/ML

## 2020-12-22 LAB
ALBUMIN SERPL ELPH-MCNC: 4.6 G/DL
ALP BLD-CCNC: 179 U/L
ALT SERPL-CCNC: 20 U/L
ANION GAP SERPL CALC-SCNC: 10 MMOL/L
AST SERPL-CCNC: 25 U/L
BILIRUB SERPL-MCNC: 0.7 MG/DL
BUN SERPL-MCNC: 8 MG/DL
CALCIUM SERPL-MCNC: 9.1 MG/DL
CHLORIDE SERPL-SCNC: 106 MMOL/L
CHOLEST SERPL-MCNC: 120 MG/DL
CO2 SERPL-SCNC: 24 MMOL/L
CREAT SERPL-MCNC: 0.91 MG/DL
ESTIMATED AVERAGE GLUCOSE: 117 MG/DL
GLUCOSE SERPL-MCNC: 102 MG/DL
HBA1C MFR BLD HPLC: 5.7 %
HDLC SERPL-MCNC: 45 MG/DL
LDLC SERPL CALC-MCNC: 36 MG/DL
NONHDLC SERPL-MCNC: 75 MG/DL
POTASSIUM SERPL-SCNC: 4.3 MMOL/L
PROT SERPL-MCNC: 7.1 G/DL
SODIUM SERPL-SCNC: 140 MMOL/L
TRIGL SERPL-MCNC: 195 MG/DL

## 2020-12-23 ENCOUNTER — APPOINTMENT (OUTPATIENT)
Age: 45
End: 2020-12-23

## 2020-12-23 LAB
HBV DNA # SERPL NAA+PROBE: NOT DETECTED IU/ML
HEPB DNA PCR LOG: NOT DETECTED LOG10IU/ML

## 2020-12-24 ENCOUNTER — LABORATORY RESULT (OUTPATIENT)
Age: 45
End: 2020-12-24

## 2020-12-24 ENCOUNTER — RESULT REVIEW (OUTPATIENT)
Age: 45
End: 2020-12-24

## 2020-12-24 ENCOUNTER — APPOINTMENT (OUTPATIENT)
Age: 45
End: 2020-12-24

## 2020-12-24 ENCOUNTER — APPOINTMENT (OUTPATIENT)
Age: 45
End: 2020-12-24
Payer: COMMERCIAL

## 2020-12-24 DIAGNOSIS — R06.6 HICCOUGH: ICD-10-CM

## 2020-12-24 LAB
BASOPHILS # BLD AUTO: 0.08 K/UL — SIGNIFICANT CHANGE UP (ref 0–0.2)
BASOPHILS NFR BLD AUTO: 0.6 % — SIGNIFICANT CHANGE UP (ref 0–2)
EOSINOPHIL # BLD AUTO: 0.09 K/UL — SIGNIFICANT CHANGE UP (ref 0–0.5)
EOSINOPHIL NFR BLD AUTO: 0.7 % — SIGNIFICANT CHANGE UP (ref 0–6)
HCT VFR BLD CALC: 38.1 % — LOW (ref 39–50)
HGB BLD-MCNC: 12.3 G/DL — LOW (ref 13–17)
IMM GRANULOCYTES NFR BLD AUTO: 4.8 % — HIGH (ref 0–1.5)
LYMPHOCYTES # BLD AUTO: 1.83 K/UL — SIGNIFICANT CHANGE UP (ref 1–3.3)
LYMPHOCYTES # BLD AUTO: 14.6 % — SIGNIFICANT CHANGE UP (ref 13–44)
MCHC RBC-ENTMCNC: 28.5 PG — SIGNIFICANT CHANGE UP (ref 27–34)
MCHC RBC-ENTMCNC: 32.3 G/DL — SIGNIFICANT CHANGE UP (ref 32–36)
MCV RBC AUTO: 88.2 FL — SIGNIFICANT CHANGE UP (ref 80–100)
MONOCYTES # BLD AUTO: 0.98 K/UL — HIGH (ref 0–0.9)
MONOCYTES NFR BLD AUTO: 7.8 % — SIGNIFICANT CHANGE UP (ref 2–14)
NEUTROPHILS # BLD AUTO: 8.95 K/UL — HIGH (ref 1.8–7.4)
NEUTROPHILS NFR BLD AUTO: 71.5 % — SIGNIFICANT CHANGE UP (ref 43–77)
NRBC # BLD: 0 /100 WBCS — SIGNIFICANT CHANGE UP (ref 0–0)
PLATELET # BLD AUTO: 188 K/UL — SIGNIFICANT CHANGE UP (ref 150–400)
RBC # BLD: 4.32 M/UL — SIGNIFICANT CHANGE UP (ref 4.2–5.8)
RBC # FLD: 17.7 % — HIGH (ref 10.3–14.5)
WBC # BLD: 12.53 K/UL — HIGH (ref 3.8–10.5)
WBC # FLD AUTO: 12.53 K/UL — HIGH (ref 3.8–10.5)

## 2020-12-24 PROCEDURE — 99213 OFFICE O/P EST LOW 20 MIN: CPT

## 2020-12-24 PROCEDURE — 99072 ADDL SUPL MATRL&STAF TM PHE: CPT

## 2020-12-26 ENCOUNTER — APPOINTMENT (OUTPATIENT)
Age: 45
End: 2020-12-26

## 2020-12-26 PROBLEM — R11.2 CHEMOTHERAPY-INDUCED NAUSEA AND VOMITING: Status: RESOLVED | Noted: 2020-10-05 | Resolved: 2020-12-26

## 2020-12-26 NOTE — HISTORY OF PRESENT ILLNESS
[Disease: _____________________] : Disease: [unfilled] [T: ___] : T[unfilled] [N: ___] : N[unfilled] [M: ___] : M[unfilled] [AJCC Stage: ____] : AJCC Stage: [unfilled] [Therapy: ___] : Therapy: [unfilled] [Cycle: ___] : Cycle: [unfilled] [Day: ___] : Day: [unfilled] [de-identified] : 45 M with h/o chronic hepatitis B on entecavir presents for further management of resected high risk Stage III (T3/4N2), DAVIE R colon cancer, \par \par Bobby was admitted to [facility] on 8/2/20 with acute onset abdominal pain and fever. CT A/P showed circumferential mural thickening of the ascending colon with adjacent enlarged mesenteric LN. Primary consideration was a neoplasm. A colonoscopy on 8/4/20 showed an obstructing mass in the ascending colon. 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 On Aug 7, 2020, underwent a hemicolectomy, excised portion of small bowel. BCx from admission showed Clostridum septicum but repeat cultures were negative. Completed 2 weeks of antibiotics. Post op course otherwise was unremarkable. \par \par Referred to medical oncology for adjuvant therapy. \par \par 8/24/20: CT Chest: 4 mm LL nodule, 3 mos scan f/u to determine stability \par 9/2/20:C1 FOLFOX\par 9/16/20: C2\par 9/30/20: C3\par 10/14/20 C4\par 11/11/20 C5 (cytokine storm during Oxali infusion characterized by hypotension resolved with IVF)\par 11/14/20: CT CAP: pulm nodule unchanged, no mets\par 11/25/20: C6 (to run NS @100 ml/hr along with Oxali over 4 hours)\par  [de-identified] : moderately invasive adenocarcinoma  [de-identified] : During last tx developed cytokine reaction during oxali with hypotension. No other symptoms. BP improved with IVF and was able to finish infusion\par Reviewed scans with pt, AMANDEEP, pulm nodule stable. Reassuring not cancer\par Overall feels fine. Denies any c/o. Neuropathy only with cold. Good appetite and energy level. Normal BMs

## 2020-12-28 ENCOUNTER — NON-APPOINTMENT (OUTPATIENT)
Age: 45
End: 2020-12-28

## 2020-12-28 LAB
BASOPHILS # BLD AUTO: 0.07 K/UL
BASOPHILS NFR BLD AUTO: 0.6 %
EOSINOPHIL # BLD AUTO: 0.06 K/UL
EOSINOPHIL NFR BLD AUTO: 0.5 %
HCT VFR BLD CALC: 37.4 %
HGB BLD-MCNC: 11.7 G/DL
IMM GRANULOCYTES NFR BLD AUTO: 5.4 %
LYMPHOCYTES # BLD AUTO: 1.66 K/UL
LYMPHOCYTES NFR BLD AUTO: 15.2 %
MAN DIFF?: NORMAL
MCHC RBC-ENTMCNC: 28.7 PG
MCHC RBC-ENTMCNC: 31.3 GM/DL
MCV RBC AUTO: 91.7 FL
MONOCYTES # BLD AUTO: 1.03 K/UL
MONOCYTES NFR BLD AUTO: 9.4 %
NEUTROPHILS # BLD AUTO: 7.5 K/UL
NEUTROPHILS NFR BLD AUTO: 68.9 %
PLATELET # BLD AUTO: 170 K/UL
RBC # BLD: 4.08 M/UL
RBC # FLD: 18.3 %
WBC # FLD AUTO: 10.91 K/UL

## 2020-12-31 ENCOUNTER — OUTPATIENT (OUTPATIENT)
Dept: OUTPATIENT SERVICES | Facility: HOSPITAL | Age: 45
LOS: 1 days | Discharge: HOME | End: 2020-12-31

## 2020-12-31 DIAGNOSIS — Z90.49 ACQUIRED ABSENCE OF OTHER SPECIFIED PARTS OF DIGESTIVE TRACT: Chronic | ICD-10-CM

## 2020-12-31 DIAGNOSIS — C18.9 MALIGNANT NEOPLASM OF COLON, UNSPECIFIED: ICD-10-CM

## 2021-01-04 ENCOUNTER — LABORATORY RESULT (OUTPATIENT)
Age: 46
End: 2021-01-04

## 2021-01-04 ENCOUNTER — APPOINTMENT (OUTPATIENT)
Dept: HEPATOLOGY | Facility: CLINIC | Age: 46
End: 2021-01-04
Payer: COMMERCIAL

## 2021-01-04 ENCOUNTER — APPOINTMENT (OUTPATIENT)
Age: 46
End: 2021-01-04

## 2021-01-04 VITALS
BODY MASS INDEX: 25.58 KG/M2 | WEIGHT: 159.2 LBS | HEART RATE: 86 BPM | HEIGHT: 66 IN | DIASTOLIC BLOOD PRESSURE: 89 MMHG | TEMPERATURE: 98 F | RESPIRATION RATE: 16 BRPM | SYSTOLIC BLOOD PRESSURE: 129 MMHG

## 2021-01-04 PROCEDURE — 99072 ADDL SUPL MATRL&STAF TM PHE: CPT

## 2021-01-04 PROCEDURE — 91200 LIVER ELASTOGRAPHY: CPT

## 2021-01-04 PROCEDURE — 99214 OFFICE O/P EST MOD 30 MIN: CPT

## 2021-01-04 RX ORDER — PANTOPRAZOLE 40 MG/1
40 TABLET, DELAYED RELEASE ORAL DAILY
Qty: 30 | Refills: 6 | Status: DISCONTINUED | COMMUNITY
Start: 2020-09-16 | End: 2021-01-04

## 2021-01-04 RX ORDER — AMLODIPINE BESYLATE VALSARTAN HYDROCHLOROTHIAZIDE 10; 25; 320 MG/1; MG/1; MG/1
TABLET, FILM COATED ORAL
Refills: 0 | Status: DISCONTINUED | COMMUNITY
End: 2021-01-04

## 2021-01-04 RX ORDER — PANTOPRAZOLE 40 MG/1
40 TABLET, DELAYED RELEASE ORAL
Refills: 0 | Status: DISCONTINUED | COMMUNITY
End: 2021-01-04

## 2021-01-04 NOTE — ASSESSMENT
[FreeTextEntry1] : Mr. KIM is a 45 year old man with chronic hepatitis B, HTN, HLD, hospitalization 8/-2-11/2020 with C. 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 HBsAg and HBcAb positive. HBsAb(-)\par - HCC screening: [place] 10/6/20 negative\par - overweight, BMI 26.5 after 20 lbs weight loss since surgery - was likely beneficial for his fatty liver\par - AST/ALT normal in 12/2020 - ALT was mildly elev in 8/2020. ALP elevated 169 U/L was normal in 8/2020\par - normocytic anemia, Hb 11.5, MCV 84\par - likely SHEA - fatty liver seen on CT 8/02/20, elevated ALT likely due to that\par \par - naive to hepatitis A and C. Can have non-live vaccines\par - wife was vaccinated against hepatitis B\par \par Plan:\par - hepatitis B: continue entecavir\par - HCC screening every 6 months, in 4/2020, return after that\par - elev. ALP: isoenzymes\par - will need colonoscopy after 1 year, 8/2021. Will plan at next visit

## 2021-01-04 NOTE — HISTORY OF PRESENT ILLNESS
[de-identified] : - 1/4/20: returns after fibroscan, US and CT abdomen. Reports slight tingling on fingers, otherwise feels normal.\par \par - Mr. KIM is a 45 year old man with chronic hepatitis B, on entecavir since about 2008, HTN, HLD, recent hospitalization 8/-2-11/2020 with C. 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 Used to weigh 177 lbs prior to that for many years.\par Alcohol hx: does not drink, never drank heavily. Shx: [place] 8 from [facility]\par \par Workup:\par - 8/2/20 CT abdomen: fatty liver, Circumferential mural thickening of the ascending colon with adjacent enlarged mesenteric lymph nodes. Primary consideration is neoplasm. Colitis is considered less likely.

## 2021-01-06 ENCOUNTER — RESULT REVIEW (OUTPATIENT)
Age: 46
End: 2021-01-06

## 2021-01-06 ENCOUNTER — LABORATORY RESULT (OUTPATIENT)
Age: 46
End: 2021-01-06

## 2021-01-06 ENCOUNTER — APPOINTMENT (OUTPATIENT)
Age: 46
End: 2021-01-06

## 2021-01-06 ENCOUNTER — APPOINTMENT (OUTPATIENT)
Dept: HEMATOLOGY ONCOLOGY | Facility: CLINIC | Age: 46
End: 2021-01-06
Payer: COMMERCIAL

## 2021-01-06 VITALS
DIASTOLIC BLOOD PRESSURE: 89 MMHG | RESPIRATION RATE: 18 BRPM | TEMPERATURE: 98.2 F | HEART RATE: 75 BPM | SYSTOLIC BLOOD PRESSURE: 132 MMHG

## 2021-01-06 DIAGNOSIS — R11.2 NAUSEA WITH VOMITING, UNSPECIFIED: ICD-10-CM

## 2021-01-06 DIAGNOSIS — E86.0 DEHYDRATION: ICD-10-CM

## 2021-01-06 DIAGNOSIS — Z51.11 ENCOUNTER FOR ANTINEOPLASTIC CHEMOTHERAPY: ICD-10-CM

## 2021-01-06 LAB
BASOPHILS # BLD AUTO: 0.1 K/UL — SIGNIFICANT CHANGE UP (ref 0–0.2)
BASOPHILS NFR BLD AUTO: 1 % — SIGNIFICANT CHANGE UP (ref 0–2)
EOSINOPHIL # BLD AUTO: 0.1 K/UL — SIGNIFICANT CHANGE UP (ref 0–0.5)
EOSINOPHIL NFR BLD AUTO: 1 % — SIGNIFICANT CHANGE UP (ref 0–6)
HCT VFR BLD CALC: 34.5 % — LOW (ref 39–50)
HGB BLD-MCNC: 11.4 G/DL — LOW (ref 13–17)
LYMPHOCYTES # BLD AUTO: 1.8 K/UL — SIGNIFICANT CHANGE UP (ref 1–3.3)
LYMPHOCYTES # BLD AUTO: 18 % — SIGNIFICANT CHANGE UP (ref 13–44)
MCHC RBC-ENTMCNC: 29.5 PG — SIGNIFICANT CHANGE UP (ref 27–34)
MCHC RBC-ENTMCNC: 33 G/DL — SIGNIFICANT CHANGE UP (ref 32–36)
MCV RBC AUTO: 89.1 FL — SIGNIFICANT CHANGE UP (ref 80–100)
MONOCYTES # BLD AUTO: 0.3 K/UL — SIGNIFICANT CHANGE UP (ref 0–0.9)
MONOCYTES NFR BLD AUTO: 3 % — SIGNIFICANT CHANGE UP (ref 2–14)
NEUTROPHILS # BLD AUTO: 7.7 K/UL — HIGH (ref 1.8–7.4)
NEUTROPHILS NFR BLD AUTO: 77 % — SIGNIFICANT CHANGE UP (ref 43–77)
NRBC # BLD: 0 /100 — SIGNIFICANT CHANGE UP (ref 0–0)
NRBC # BLD: SIGNIFICANT CHANGE UP /100 WBCS (ref 0–0)
PLAT MORPH BLD: NORMAL — SIGNIFICANT CHANGE UP
PLATELET # BLD AUTO: 158 K/UL — SIGNIFICANT CHANGE UP (ref 150–400)
RBC # BLD: 3.87 M/UL — LOW (ref 4.2–5.8)
RBC # FLD: 17.5 % — HIGH (ref 10.3–14.5)
RBC BLD AUTO: SIGNIFICANT CHANGE UP
WBC # BLD: 10 K/UL — SIGNIFICANT CHANGE UP (ref 3.8–10.5)
WBC # FLD AUTO: 10 K/UL — SIGNIFICANT CHANGE UP (ref 3.8–10.5)

## 2021-01-06 PROCEDURE — 99213 OFFICE O/P EST LOW 20 MIN: CPT

## 2021-01-08 ENCOUNTER — APPOINTMENT (OUTPATIENT)
Age: 46
End: 2021-01-08

## 2021-01-08 LAB
ALKPISO INTERP: NORMAL
ALP BLD-CCNC: 171 U/L
ALP BONE CFR SERPL: 64 %
ALP INTEST CFR SERPL: 0 %
ALP LIVER CFR SERPL: 36 %
ALP MACRO CFR SERPL: 0 %
ALP PLAC CFR SERPL: 0 %

## 2021-01-10 DIAGNOSIS — Z51.89 ENCOUNTER FOR OTHER SPECIFIED AFTERCARE: ICD-10-CM

## 2021-01-17 PROBLEM — R06.6 HICCUPS: Status: ACTIVE | Noted: 2020-12-26

## 2021-01-17 NOTE — HISTORY OF PRESENT ILLNESS
[Disease: _____________________] : Disease: [unfilled] [T: ___] : T[unfilled] [N: ___] : N[unfilled] [M: ___] : M[unfilled] [AJCC Stage: ____] : AJCC Stage: [unfilled] [Therapy: ___] : Therapy: [unfilled] [Cycle: ___] : Cycle: [unfilled] [Day: ___] : Day: [unfilled] [de-identified] : moderately invasive adenocarcinoma  [de-identified] : 45 M with h/o chronic hepatitis B on entecavir presents for further management of resected high risk Stage III (T3/4N2), DAVIE R colon cancer, \par \par Bobby was admitted to [facility] on 8/2/20 with acute onset abdominal pain and fever. CT A/P showed circumferential mural thickening of the ascending colon with adjacent enlarged mesenteric LN. Primary consideration was a neoplasm. A colonoscopy on 8/4/20 showed an obstructing mass in the ascending colon. 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 On Aug 7, 2020, underwent a hemicolectomy, excised portion of small bowel. BCx from admission showed Clostridum septicum but repeat cultures were negative. Completed 2 weeks of antibiotics. Post op course otherwise was unremarkable. \par \par Referred to medical oncology for adjuvant therapy. \par \par 8/24/20: CT Chest: 4 mm LL nodule, 3 mos scan f/u to determine stability \par 9/2/20:C1 FOLFOX\par 9/16/20: C2\par 9/30/20: C3\par 10/14/20 C4\par 11/11/20 C5 (cytokine storm during Oxali infusion characterized by hypotension resolved with IVF)\par 11/14/20: CT CAP: pulm nodule unchanged, no mets\par 11/25/20: C6 (to run NS @100 ml/hr along with Oxali over 4 hours)\par 12/9/20: C7\par 12/24/20: C8  [de-identified] : Tolerating tx well. c/o hiccups after treatment, Reglan intermittently helps. Good appetite, energy level stable. No significant neuropathy outside of cold sensitivity. Cont to do acupuncture

## 2021-01-19 NOTE — HISTORY OF PRESENT ILLNESS
[Disease: _____________________] : Disease: [unfilled] [T: ___] : T[unfilled] [N: ___] : N[unfilled] [M: ___] : M[unfilled] [AJCC Stage: ____] : AJCC Stage: [unfilled] [Therapy: ___] : Therapy: [unfilled] [Cycle: ___] : Cycle: [unfilled] [Day: ___] : Day: [unfilled] [de-identified] : 45 M with h/o chronic hepatitis B on entecavir presents for further management of resected high risk Stage III (T3/4N2), DAVIE R colon cancer, \par \par Bobby was admitted to [facility] on 8/2/20 with acute onset abdominal pain and fever. CT A/P showed circumferential mural thickening of the ascending colon with adjacent enlarged mesenteric LN. Primary consideration was a neoplasm. A colonoscopy on 8/4/20 showed an obstructing mass in the ascending colon. 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 On Aug 7, 2020, underwent a hemicolectomy, excised portion of small bowel. BCx from admission showed Clostridum septicum but repeat cultures were negative. Completed 2 weeks of antibiotics. Post op course otherwise was unremarkable. \par \par Referred to medical oncology for adjuvant therapy. \par \par 8/24/20: CT Chest: 4 mm LL nodule, 3 mos scan f/u to determine stability \par 9/2/20:C1 FOLFOX\par 9/16/20: C2\par 9/30/20: C3\par 10/14/20 C4\par 10/28/20 C5\par 11/11/20 C6 (cytokine storm during Oxali infusion characterized by hypotension; resolved with IVF)\par 11/14/20: CT CAP: pulm nodule unchanged, no mets\par 11/25/20: C7 (to run NS @100 ml/hr along with Oxali over 4 hours)\par 12/9/20: C8\par  [de-identified] : moderately invasive adenocarcinoma  [de-identified] : Seen in the treatment area\par \par PN limited to cold for a few days, no PN otherwise. Continues acupuncture every other week on Fridays. \par No dizziness, nausea or diarrhea\par Great appetite\par Trying to stay active with daily walks (distance is less)

## 2021-01-19 NOTE — REASON FOR VISIT
[Follow-Up Visit] : a follow-up [Spouse] : spouse [FreeTextEntry2] : Stage III colon cancer on adjuvant Xelox

## 2021-01-20 ENCOUNTER — APPOINTMENT (OUTPATIENT)
Dept: HEMATOLOGY ONCOLOGY | Facility: CLINIC | Age: 46
End: 2021-01-20
Payer: COMMERCIAL

## 2021-01-20 ENCOUNTER — LABORATORY RESULT (OUTPATIENT)
Age: 46
End: 2021-01-20

## 2021-01-20 ENCOUNTER — RESULT REVIEW (OUTPATIENT)
Age: 46
End: 2021-01-20

## 2021-01-20 ENCOUNTER — APPOINTMENT (OUTPATIENT)
Age: 46
End: 2021-01-20

## 2021-01-20 VITALS
RESPIRATION RATE: 18 BRPM | TEMPERATURE: 97.9 F | SYSTOLIC BLOOD PRESSURE: 129 MMHG | DIASTOLIC BLOOD PRESSURE: 89 MMHG | WEIGHT: 164 LBS | BODY MASS INDEX: 26.47 KG/M2 | HEART RATE: 79 BPM

## 2021-01-20 LAB
BASOPHILS # BLD AUTO: 0 K/UL — SIGNIFICANT CHANGE UP (ref 0–0.2)
BASOPHILS NFR BLD AUTO: 0 % — SIGNIFICANT CHANGE UP (ref 0–2)
EOSINOPHIL # BLD AUTO: 0 K/UL — SIGNIFICANT CHANGE UP (ref 0–0.5)
EOSINOPHIL NFR BLD AUTO: 0 % — SIGNIFICANT CHANGE UP (ref 0–6)
HCT VFR BLD CALC: 34.5 % — LOW (ref 39–50)
HGB BLD-MCNC: 11.4 G/DL — LOW (ref 13–17)
LYMPHOCYTES # BLD AUTO: 1.23 K/UL — SIGNIFICANT CHANGE UP (ref 1–3.3)
LYMPHOCYTES # BLD AUTO: 11 % — LOW (ref 13–44)
MCHC RBC-ENTMCNC: 29.9 PG — SIGNIFICANT CHANGE UP (ref 27–34)
MCHC RBC-ENTMCNC: 33 G/DL — SIGNIFICANT CHANGE UP (ref 32–36)
MCV RBC AUTO: 90.6 FL — SIGNIFICANT CHANGE UP (ref 80–100)
MONOCYTES # BLD AUTO: 1.12 K/UL — HIGH (ref 0–0.9)
MONOCYTES NFR BLD AUTO: 10 % — SIGNIFICANT CHANGE UP (ref 2–14)
NEUTROPHILS # BLD AUTO: 8.85 K/UL — HIGH (ref 1.8–7.4)
NEUTROPHILS NFR BLD AUTO: 79 % — HIGH (ref 43–77)
NRBC # BLD: 1 /100 — HIGH (ref 0–0)
NRBC # BLD: SIGNIFICANT CHANGE UP /100 WBCS (ref 0–0)
PLAT MORPH BLD: NORMAL — SIGNIFICANT CHANGE UP
PLATELET # BLD AUTO: 137 K/UL — LOW (ref 150–400)
RBC # BLD: 3.81 M/UL — LOW (ref 4.2–5.8)
RBC # FLD: 16.8 % — HIGH (ref 10.3–14.5)
RBC BLD AUTO: SIGNIFICANT CHANGE UP
WBC # BLD: 11.2 K/UL — HIGH (ref 3.8–10.5)
WBC # FLD AUTO: 11.2 K/UL — HIGH (ref 3.8–10.5)

## 2021-01-20 PROCEDURE — 99072 ADDL SUPL MATRL&STAF TM PHE: CPT

## 2021-01-20 PROCEDURE — 99213 OFFICE O/P EST LOW 20 MIN: CPT

## 2021-01-22 ENCOUNTER — APPOINTMENT (OUTPATIENT)
Age: 46
End: 2021-01-22

## 2021-01-26 NOTE — REVIEW OF SYSTEMS
[Joint Pain] : joint pain [Negative] : Allergic/Immunologic [Fatigue] : fatigue [FreeTextEntry2] : post treatment [FreeTextEntry9] : post onpro [de-identified] : CPIN fingers> toes slightly increased

## 2021-01-26 NOTE — HISTORY OF PRESENT ILLNESS
[Disease: _____________________] : Disease: [unfilled] [T: ___] : T[unfilled] [N: ___] : N[unfilled] [M: ___] : M[unfilled] [AJCC Stage: ____] : AJCC Stage: [unfilled] [Therapy: ___] : Therapy: [unfilled] [Cycle: ___] : Cycle: [unfilled] [Day: ___] : Day: [unfilled] [de-identified] : moderately invasive adenocarcinoma  [de-identified] : 45 M with h/o chronic hepatitis B on entecavir presents for further management of resected high risk Stage III (T3/4N2), DAVIE R colon cancer, \par \par Bobby was admitted to [facility] on 8/2/20 with acute onset abdominal pain and fever. CT A/P showed circumferential mural thickening of the ascending colon with adjacent enlarged mesenteric LN. Primary consideration was a neoplasm. A colonoscopy on 8/4/20 showed an obstructing mass in the ascending colon. 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 On Aug 7, 2020, underwent a hemicolectomy, excised portion of small bowel. BCx from admission showed Clostridum septicum but repeat cultures were negative. Completed 2 weeks of antibiotics. Post op course otherwise was unremarkable. \par \par Referred to medical oncology for adjuvant therapy. \par 8/24/20: CT Chest: 4 mm LL nodule, 3 mos scan f/u to determine stability \par 9/2/20:C1 FOLFOX\par 9/16/20: C2\par 9/30/20: C3\par 10/14/20 C4\par 10/28/20 C5\par 11/11/20 C6 (cytokine storm during Oxali infusion characterized by hypotension resolved with IVF)\par 11/14/20: CT CAP: pulm nodule unchanged, no mets\par 11/25/20: C7 (to run NS @100 ml/hr along with Oxali over 4 hours)\par 12/9/20: C8\par 12/24/20: C9\par Interval FU with hepatology for Hep B, SHEA \par 1/6/21: C10 [de-identified] : Seen in the treatment room \par \par The paresthesias are slightly more intense, fingers> toes, but the paresthesias do not interfere with function.  He is continuing with acupuncture. \par Due to the holidays and weather, less consistent with daily walks; post treatment fatigue may be more noticeable this cycle. \par Aching bones from onpro that is tolerable.\par \par

## 2021-01-26 NOTE — PHYSICAL EXAM
[Fully active, able to carry on all pre-disease performance without restriction] : Status 0 - Fully active, able to carry on all pre-disease performance without restriction [Normal] : affect appropriate [de-identified] : Gait steady

## 2021-01-29 ENCOUNTER — APPOINTMENT (OUTPATIENT)
Age: 46
End: 2021-01-29

## 2021-01-30 ENCOUNTER — OUTPATIENT (OUTPATIENT)
Dept: OUTPATIENT SERVICES | Facility: HOSPITAL | Age: 46
LOS: 1 days | Discharge: HOME | End: 2021-01-30

## 2021-01-30 DIAGNOSIS — Z90.49 ACQUIRED ABSENCE OF OTHER SPECIFIED PARTS OF DIGESTIVE TRACT: Chronic | ICD-10-CM

## 2021-01-30 DIAGNOSIS — C18.9 MALIGNANT NEOPLASM OF COLON, UNSPECIFIED: ICD-10-CM

## 2021-01-31 LAB — SARS-COV-2 N GENE NPH QL NAA+PROBE: NOT DETECTED

## 2021-02-03 ENCOUNTER — RESULT REVIEW (OUTPATIENT)
Age: 46
End: 2021-02-03

## 2021-02-03 ENCOUNTER — LABORATORY RESULT (OUTPATIENT)
Age: 46
End: 2021-02-03

## 2021-02-03 ENCOUNTER — APPOINTMENT (OUTPATIENT)
Age: 46
End: 2021-02-03
Payer: COMMERCIAL

## 2021-02-03 ENCOUNTER — APPOINTMENT (OUTPATIENT)
Age: 46
End: 2021-02-03

## 2021-02-03 DIAGNOSIS — Z51.11 ENCOUNTER FOR ANTINEOPLASTIC CHEMOTHERAPY: ICD-10-CM

## 2021-02-03 DIAGNOSIS — R11.2 NAUSEA WITH VOMITING, UNSPECIFIED: ICD-10-CM

## 2021-02-03 LAB
BASOPHILS # BLD AUTO: 0 K/UL — SIGNIFICANT CHANGE UP (ref 0–0.2)
BASOPHILS NFR BLD AUTO: 0 % — SIGNIFICANT CHANGE UP (ref 0–2)
EOSINOPHIL # BLD AUTO: 0 K/UL — SIGNIFICANT CHANGE UP (ref 0–0.5)
EOSINOPHIL NFR BLD AUTO: 0 % — SIGNIFICANT CHANGE UP (ref 0–6)
HCT VFR BLD CALC: 32.9 % — LOW (ref 39–50)
HGB BLD-MCNC: 10.7 G/DL — LOW (ref 13–17)
LYMPHOCYTES # BLD AUTO: 19 % — SIGNIFICANT CHANGE UP (ref 13–44)
LYMPHOCYTES # BLD AUTO: 2.62 K/UL — SIGNIFICANT CHANGE UP (ref 1–3.3)
MCHC RBC-ENTMCNC: 30.1 PG — SIGNIFICANT CHANGE UP (ref 27–34)
MCHC RBC-ENTMCNC: 32.5 G/DL — SIGNIFICANT CHANGE UP (ref 32–36)
MCV RBC AUTO: 92.4 FL — SIGNIFICANT CHANGE UP (ref 80–100)
MONOCYTES # BLD AUTO: 0.83 K/UL — SIGNIFICANT CHANGE UP (ref 0–0.9)
MONOCYTES NFR BLD AUTO: 6 % — SIGNIFICANT CHANGE UP (ref 2–14)
NEUTROPHILS # BLD AUTO: 10.19 K/UL — HIGH (ref 1.8–7.4)
NEUTROPHILS NFR BLD AUTO: 74 % — SIGNIFICANT CHANGE UP (ref 43–77)
NRBC # BLD: 1 /100 — HIGH (ref 0–0)
NRBC # BLD: SIGNIFICANT CHANGE UP /100 WBCS (ref 0–0)
PLAT MORPH BLD: NORMAL — SIGNIFICANT CHANGE UP
PLATELET # BLD AUTO: 139 K/UL — LOW (ref 150–400)
PROMYELOCYTES # FLD: 1 % — HIGH (ref 0–0)
RBC # BLD: 3.56 M/UL — LOW (ref 4.2–5.8)
RBC # FLD: 16.6 % — HIGH (ref 10.3–14.5)
RBC BLD AUTO: SIGNIFICANT CHANGE UP
WBC # BLD: 13.77 K/UL — HIGH (ref 3.8–10.5)
WBC # FLD AUTO: 13.77 K/UL — HIGH (ref 3.8–10.5)

## 2021-02-03 PROCEDURE — 99072 ADDL SUPL MATRL&STAF TM PHE: CPT

## 2021-02-03 PROCEDURE — 99213 OFFICE O/P EST LOW 20 MIN: CPT

## 2021-02-04 ENCOUNTER — NON-APPOINTMENT (OUTPATIENT)
Age: 46
End: 2021-02-04

## 2021-02-05 ENCOUNTER — APPOINTMENT (OUTPATIENT)
Age: 46
End: 2021-02-05

## 2021-02-15 NOTE — REVIEW OF SYSTEMS
[Fatigue] : fatigue [Negative] : ENT [FreeTextEntry2] : post treatment [de-identified] : Mild CPIN slightly increased

## 2021-02-15 NOTE — HISTORY OF PRESENT ILLNESS
[Cycle: ___] : Cycle: [unfilled] [T: ___] : T[unfilled] [Disease: _____________________] : Disease: [unfilled] [N: ___] : N[unfilled] [M: ___] : M[unfilled] [AJCC Stage: ____] : AJCC Stage: [unfilled] [Therapy: ___] : Therapy: [unfilled] [Day: ___] : Day: [unfilled] [de-identified] : 45 M with h/o chronic hepatitis B on entecavir presents for further management of resected high risk Stage III (T3/4N2), DAVIE R colon cancer, \par \par Bobby was admitted to [facility] on 8/2/20 with acute onset abdominal pain and fever. CT A/P showed circumferential mural thickening of the ascending colon with adjacent enlarged mesenteric LN. Primary consideration was a neoplasm. A colonoscopy on 8/4/20 showed an obstructing mass in the ascending colon. 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 On Aug 7, 2020, underwent a hemicolectomy, excised portion of small bowel. BCx from admission showed Clostridum septicum but repeat cultures were negative. Completed 2 weeks of antibiotics. Post op course otherwise was unremarkable. \par \par Referred to medical oncology for adjuvant therapy. \par \par 8/24/20: CT Chest: 4 mm LL nodule, 3 mos scan f/u to determine stability \par 9/2/20:C1 FOLFOX\par 9/16/20: C2\par 9/30/20: C3\par 10/14/20 C4\par 10/28/20 C5\par 11/11/20 C6  (cytokine storm during Oxali infusion characterized by hypotension resolved with IVF)\par 11/14/20: CT CAP: pulm nodule unchanged, no mets\par 11/25/20: C7 (to run NS @100 ml/hr along with Oxali over 4 hours)\par 12/9/20: C8\par 12/24/20: C9\par 1/6/21: C10\par 1/20/21: C11\par  [de-identified] : moderately invasive adenocarcinoma  [de-identified] : Seen in the treatment room \par \par doing well \par fatigue is more noticeable with subsequent cycles\par continues to work, use  treadmill weight is stable \par appetite is good\par \par Paresthesias in his fingers are very minimally worse than last cycle. Not in the toes. No interference with function.\par No nausea, diarrhea\par Encouraged to call if PN changes for cycle 12

## 2021-02-27 ENCOUNTER — RESULT REVIEW (OUTPATIENT)
Age: 46
End: 2021-02-27

## 2021-02-27 ENCOUNTER — APPOINTMENT (OUTPATIENT)
Dept: CT IMAGING | Facility: CLINIC | Age: 46
End: 2021-02-27
Payer: COMMERCIAL

## 2021-02-27 ENCOUNTER — OUTPATIENT (OUTPATIENT)
Dept: OUTPATIENT SERVICES | Facility: HOSPITAL | Age: 46
LOS: 1 days | End: 2021-02-27
Payer: COMMERCIAL

## 2021-02-27 DIAGNOSIS — Z90.49 ACQUIRED ABSENCE OF OTHER SPECIFIED PARTS OF DIGESTIVE TRACT: Chronic | ICD-10-CM

## 2021-02-27 DIAGNOSIS — C18.9 MALIGNANT NEOPLASM OF COLON, UNSPECIFIED: ICD-10-CM

## 2021-02-27 PROCEDURE — 71260 CT THORAX DX C+: CPT | Mod: 26

## 2021-02-27 PROCEDURE — 71260 CT THORAX DX C+: CPT

## 2021-02-27 PROCEDURE — 74177 CT ABD & PELVIS W/CONTRAST: CPT

## 2021-02-27 PROCEDURE — 74177 CT ABD & PELVIS W/CONTRAST: CPT | Mod: 26

## 2021-03-04 NOTE — HISTORY OF PRESENT ILLNESS
[Disease: _____________________] : Disease: [unfilled] [T: ___] : T[unfilled] [N: ___] : N[unfilled] [M: ___] : M[unfilled] [AJCC Stage: ____] : AJCC Stage: [unfilled] [Therapy: ___] : Therapy: [unfilled] [Cycle: ___] : Cycle: [unfilled] [Day: ___] : Day: [unfilled] [de-identified] : 45 M with h/o chronic hepatitis B on entecavir presents for further management of resected high risk Stage III (T3/4N2), DAVIE R colon cancer, \par \par Bobby was admitted to [facility] on 8/2/20 with acute onset abdominal pain and fever. CT A/P showed circumferential mural thickening of the ascending colon with adjacent enlarged mesenteric LN. Primary consideration was a neoplasm. A colonoscopy on 8/4/20 showed an obstructing mass in the ascending colon. 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 On Aug 7, 2020, underwent a hemicolectomy, excised portion of small bowel. BCx from admission showed Clostridum septicum but repeat cultures were negative. Completed 2 weeks of antibiotics. Post op course otherwise was unremarkable. \par \par Referred to medical oncology for adjuvant therapy. \par \par 8/24/20: CT Chest: 4 mm LL nodule, 3 mos scan f/u to determine stability \par 9/2-2/3/21:C1-12 FOLFOX (omitted Oxaliplatin C12)\par 11/11/20 C5 (cytokine storm during Oxali infusion characterized by hypotension resolved with IVF)\par 11/14/20: CT CAP: pulm nodule unchanged, no mets\par 11/25/20: C6 (to run NS @100 ml/hr along with Oxali over 4 hours)\par  [de-identified] : moderately invasive adenocarcinoma  [de-identified] : Neuropathy has increased, more constant now in hands. Feet ok. Not painful, mostly numb. Not interfering with activities. Doing acupuncture. Appetite remains good. Weight is stable. Denies sany pain. BMs are normal. Today last last dose of chemo.

## 2021-03-16 ENCOUNTER — OUTPATIENT (OUTPATIENT)
Dept: OUTPATIENT SERVICES | Facility: HOSPITAL | Age: 46
LOS: 1 days | Discharge: HOME | End: 2021-03-16

## 2021-03-16 DIAGNOSIS — C18.9 MALIGNANT NEOPLASM OF COLON, UNSPECIFIED: ICD-10-CM

## 2021-03-16 DIAGNOSIS — Z90.49 ACQUIRED ABSENCE OF OTHER SPECIFIED PARTS OF DIGESTIVE TRACT: Chronic | ICD-10-CM

## 2021-03-19 ENCOUNTER — RESULT REVIEW (OUTPATIENT)
Age: 46
End: 2021-03-19

## 2021-03-19 ENCOUNTER — APPOINTMENT (OUTPATIENT)
Age: 46
End: 2021-03-19
Payer: COMMERCIAL

## 2021-03-19 ENCOUNTER — LABORATORY RESULT (OUTPATIENT)
Age: 46
End: 2021-03-19

## 2021-03-19 ENCOUNTER — APPOINTMENT (OUTPATIENT)
Age: 46
End: 2021-03-19

## 2021-03-19 VITALS
BODY MASS INDEX: 24.45 KG/M2 | DIASTOLIC BLOOD PRESSURE: 86 MMHG | OXYGEN SATURATION: 98 % | WEIGHT: 152.12 LBS | SYSTOLIC BLOOD PRESSURE: 126 MMHG | HEIGHT: 65.98 IN | RESPIRATION RATE: 17 BRPM | TEMPERATURE: 97.1 F | HEART RATE: 76 BPM

## 2021-03-19 LAB
BASOPHILS # BLD AUTO: 0.02 K/UL — SIGNIFICANT CHANGE UP (ref 0–0.2)
BASOPHILS NFR BLD AUTO: 0.5 % — SIGNIFICANT CHANGE UP (ref 0–2)
EOSINOPHIL # BLD AUTO: 0.13 K/UL — SIGNIFICANT CHANGE UP (ref 0–0.5)
EOSINOPHIL NFR BLD AUTO: 3.2 % — SIGNIFICANT CHANGE UP (ref 0–6)
HCT VFR BLD CALC: 36.7 % — LOW (ref 39–50)
HGB BLD-MCNC: 12.4 G/DL — LOW (ref 13–17)
IMM GRANULOCYTES NFR BLD AUTO: 0.2 % — SIGNIFICANT CHANGE UP (ref 0–1.5)
LYMPHOCYTES # BLD AUTO: 1.32 K/UL — SIGNIFICANT CHANGE UP (ref 1–3.3)
LYMPHOCYTES # BLD AUTO: 32.5 % — SIGNIFICANT CHANGE UP (ref 13–44)
MCHC RBC-ENTMCNC: 29.9 PG — SIGNIFICANT CHANGE UP (ref 27–34)
MCHC RBC-ENTMCNC: 33.8 G/DL — SIGNIFICANT CHANGE UP (ref 32–36)
MCV RBC AUTO: 88.4 FL — SIGNIFICANT CHANGE UP (ref 80–100)
MONOCYTES # BLD AUTO: 0.35 K/UL — SIGNIFICANT CHANGE UP (ref 0–0.9)
MONOCYTES NFR BLD AUTO: 8.6 % — SIGNIFICANT CHANGE UP (ref 2–14)
NEUTROPHILS # BLD AUTO: 2.23 K/UL — SIGNIFICANT CHANGE UP (ref 1.8–7.4)
NEUTROPHILS NFR BLD AUTO: 55 % — SIGNIFICANT CHANGE UP (ref 43–77)
NRBC # BLD: 0 /100 WBCS — SIGNIFICANT CHANGE UP (ref 0–0)
PLATELET # BLD AUTO: 197 K/UL — SIGNIFICANT CHANGE UP (ref 150–400)
RBC # BLD: 4.15 M/UL — LOW (ref 4.2–5.8)
RBC # FLD: 12.9 % — SIGNIFICANT CHANGE UP (ref 10.3–14.5)
WBC # BLD: 4.06 K/UL — SIGNIFICANT CHANGE UP (ref 3.8–10.5)
WBC # FLD AUTO: 4.06 K/UL — SIGNIFICANT CHANGE UP (ref 3.8–10.5)

## 2021-03-19 PROCEDURE — 99072 ADDL SUPL MATRL&STAF TM PHE: CPT

## 2021-03-19 PROCEDURE — 99213 OFFICE O/P EST LOW 20 MIN: CPT

## 2021-03-19 RX ORDER — DICYCLOMINE HYDROCHLORIDE 10 MG/1
10 CAPSULE ORAL 3 TIMES DAILY
Qty: 21 | Refills: 1 | Status: DISCONTINUED | COMMUNITY
Start: 2020-09-17 | End: 2021-03-19

## 2021-03-19 RX ORDER — DEXAMETHASONE 4 MG/1
4 TABLET ORAL
Qty: 10 | Refills: 0 | Status: DISCONTINUED | COMMUNITY
Start: 2020-10-05 | End: 2021-03-19

## 2021-03-19 RX ORDER — PEGFILGRASTIM 6 MG/0.6ML
KIT SUBCUTANEOUS
Refills: 0 | Status: DISCONTINUED | COMMUNITY
End: 2021-03-19

## 2021-03-19 RX ORDER — BACLOFEN 5 MG/1
5 TABLET ORAL
Qty: 20 | Refills: 0 | Status: DISCONTINUED | COMMUNITY
Start: 2020-12-26 | End: 2021-03-19

## 2021-03-19 RX ORDER — OXALIPLATIN 5 MG/ML
INJECTION, SOLUTION INTRAVENOUS
Refills: 0 | Status: DISCONTINUED | COMMUNITY
End: 2021-03-19

## 2021-03-19 RX ORDER — LIDOCAINE AND PRILOCAINE 25; 25 MG/G; MG/G
2.5-2.5 CREAM TOPICAL
Qty: 1 | Refills: 1 | Status: DISCONTINUED | COMMUNITY
Start: 2020-08-26 | End: 2021-03-19

## 2021-03-19 RX ORDER — METOCLOPRAMIDE 10 MG/1
10 TABLET ORAL
Qty: 45 | Refills: 3 | Status: DISCONTINUED | COMMUNITY
Start: 2020-08-26 | End: 2021-03-19

## 2021-04-01 ENCOUNTER — APPOINTMENT (OUTPATIENT)
Dept: ULTRASOUND IMAGING | Facility: CLINIC | Age: 46
End: 2021-04-01
Payer: COMMERCIAL

## 2021-04-01 ENCOUNTER — OUTPATIENT (OUTPATIENT)
Dept: OUTPATIENT SERVICES | Facility: HOSPITAL | Age: 46
LOS: 1 days | End: 2021-04-01
Payer: COMMERCIAL

## 2021-04-01 DIAGNOSIS — Z90.49 ACQUIRED ABSENCE OF OTHER SPECIFIED PARTS OF DIGESTIVE TRACT: Chronic | ICD-10-CM

## 2021-04-01 DIAGNOSIS — Z00.8 ENCOUNTER FOR OTHER GENERAL EXAMINATION: ICD-10-CM

## 2021-04-01 DIAGNOSIS — B19.10 UNSPECIFIED VIRAL HEPATITIS B WITHOUT HEPATIC COMA: ICD-10-CM

## 2021-04-01 LAB
AFP-TM SERPL-MCNC: 2.2 NG/ML
ALBUMIN SERPL ELPH-MCNC: 5 G/DL
ALP BLD-CCNC: 97 U/L
ALT SERPL-CCNC: 32 U/L
ANION GAP SERPL CALC-SCNC: 11 MMOL/L
AST SERPL-CCNC: 29 U/L
BASOPHILS # BLD AUTO: 0.03 K/UL
BASOPHILS NFR BLD AUTO: 0.7 %
BILIRUB SERPL-MCNC: 1.4 MG/DL
BUN SERPL-MCNC: 11 MG/DL
CALCIUM SERPL-MCNC: 9.8 MG/DL
CHLORIDE SERPL-SCNC: 101 MMOL/L
CO2 SERPL-SCNC: 27 MMOL/L
CREAT SERPL-MCNC: 0.84 MG/DL
EOSINOPHIL # BLD AUTO: 0.22 K/UL
EOSINOPHIL NFR BLD AUTO: 5.1 %
GLUCOSE SERPL-MCNC: 102 MG/DL
HCT VFR BLD CALC: 39.8 %
HGB BLD-MCNC: 13.1 G/DL
IMM GRANULOCYTES NFR BLD AUTO: 0 %
LYMPHOCYTES # BLD AUTO: 1.6 K/UL
LYMPHOCYTES NFR BLD AUTO: 37 %
MAN DIFF?: NORMAL
MCHC RBC-ENTMCNC: 28.9 PG
MCHC RBC-ENTMCNC: 32.9 GM/DL
MCV RBC AUTO: 87.7 FL
MONOCYTES # BLD AUTO: 0.33 K/UL
MONOCYTES NFR BLD AUTO: 7.6 %
NEUTROPHILS # BLD AUTO: 2.15 K/UL
NEUTROPHILS NFR BLD AUTO: 49.6 %
PLATELET # BLD AUTO: 243 K/UL
POTASSIUM SERPL-SCNC: 4.5 MMOL/L
PROT SERPL-MCNC: 7.7 G/DL
RBC # BLD: 4.54 M/UL
RBC # FLD: 12.5 %
SODIUM SERPL-SCNC: 140 MMOL/L
WBC # FLD AUTO: 4.33 K/UL

## 2021-04-01 PROCEDURE — 76700 US EXAM ABDOM COMPLETE: CPT | Mod: 26

## 2021-04-01 PROCEDURE — 76700 US EXAM ABDOM COMPLETE: CPT

## 2021-04-02 ENCOUNTER — NON-APPOINTMENT (OUTPATIENT)
Age: 46
End: 2021-04-02

## 2021-04-05 LAB
HBV DNA # SERPL NAA+PROBE: NOT DETECTED IU/ML
HBV E AB SER QL: REACTIVE
HBV E AG SER QL: NONREACTIVE
HEPB DNA PCR LOG: NOT DETECTED LOG10IU/ML

## 2021-04-09 ENCOUNTER — APPOINTMENT (OUTPATIENT)
Age: 46
End: 2021-04-09
Payer: COMMERCIAL

## 2021-04-09 ENCOUNTER — APPOINTMENT (OUTPATIENT)
Age: 46
End: 2021-04-09

## 2021-04-09 DIAGNOSIS — R91.1 SOLITARY PULMONARY NODULE: ICD-10-CM

## 2021-04-09 PROCEDURE — 99212 OFFICE O/P EST SF 10 MIN: CPT | Mod: 95

## 2021-04-16 ENCOUNTER — APPOINTMENT (OUTPATIENT)
Dept: HEPATOLOGY | Facility: CLINIC | Age: 46
End: 2021-04-16
Payer: COMMERCIAL

## 2021-04-16 VITALS
BODY MASS INDEX: 26.84 KG/M2 | SYSTOLIC BLOOD PRESSURE: 131 MMHG | RESPIRATION RATE: 17 BRPM | DIASTOLIC BLOOD PRESSURE: 83 MMHG | WEIGHT: 167 LBS | HEART RATE: 79 BPM | TEMPERATURE: 97.9 F | HEIGHT: 65.98 IN

## 2021-04-16 DIAGNOSIS — R17 UNSPECIFIED JAUNDICE: ICD-10-CM

## 2021-04-16 DIAGNOSIS — Z90.49 ACQUIRED ABSENCE OF OTHER SPECIFIED PARTS OF DIGESTIVE TRACT: ICD-10-CM

## 2021-04-16 PROCEDURE — 99072 ADDL SUPL MATRL&STAF TM PHE: CPT

## 2021-04-16 PROCEDURE — 99214 OFFICE O/P EST MOD 30 MIN: CPT

## 2021-04-16 NOTE — ASSESSMENT
[FreeTextEntry1] : Mr. KIM is a 45 year old man with chronic hepatitis B, HTN, HLD, hospitalization 8/-2-11/2020 with C. 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 HBsAg and HBcAb positive. HBsAb(-)\par - HCC screening: [place] 4/[place] negative\par - overweight, BMI 26.5 after 20 lbs weight loss since surgery - was likely beneficial for his fatty liver\par - AST/ALT normal in 12/2020 - ALT was mildly elev. in 8/2020. ALP elevated 169 U/L was normal in 8/2020\par - normocytic anemia, Hb 11.5, MCV 84\par - NAFLD, may have SHEA - fatty liver seen on CT 8/02/20\par    - in past elevated ALP and/or AST/ALT\par    - fibroscan 1/2021: mild steatosis (S1 of 3), no fibrosis\par \par - naive to hepatitis A and C. Can have non-live vaccines\par - wife was vaccinated against hepatitis B\par \par Plan:\par - hepatitis B: continue entecavir, refilled at specialty pharmacy\par - HCC screening and repeat labs every 6 months, in 10/2020, return after that\par - colonoscopy after 1 year, in August\par - elevated bilirubin: direct bilirubin, LDH, haptoglobin, return today. Pt. will call on Monday to discuss if sooner follow-up required.\par - should loose some weight, by calorie-restricted diet and exercise\par

## 2021-04-16 NOTE — HISTORY OF PRESENT ILLNESS
[de-identified] : 4/16/21: doing well, returns after labs and US abdomen.\par \par - 1/4/20: returns after fibroscan, US and CT abdomen. Reports slight tingling on fingers, otherwise feels normal.\par \par - Mr. KIM is a 45 year old man with chronic hepatitis B, on entecavir since about 2008, HTN, HLD, recent hospitalization 8/-2-11/2020 with C. 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 Used to weigh 177 lbs prior to that for many years.\par Alcohol hx: does not drink, never drank heavily. Shx: [place] 8 from [facility]\par \par Workup:\par - 1/4/21 fibroscan: 4.3 kPa, 260 dB/m - F0, S1 - no fibrosis, mild steatosis\par - 8/2/20 CT abdomen: fatty liver, Circumferential mural thickening of the ascending colon with adjacent enlarged mesenteric lymph nodes. Primary consideration is neoplasm. Colitis is considered less likely.

## 2021-04-16 NOTE — ASU DISCHARGE PLAN (ADULT/PEDIATRIC) - ACTIVITY LEVEL
No heavy lifting [FreeTextEntry1] : 2/21: TSH 1.8, prolactin 25.8, tot chol 172, trig 105, HDL 54, LDL 97, 25D 20.6, A1c 5.5%, CMP normal, Hb 12.3

## 2021-04-19 LAB
ALBUMIN SERPL ELPH-MCNC: 5.1 G/DL
ALP BLD-CCNC: 103 U/L
ALT SERPL-CCNC: 24 U/L
ANION GAP SERPL CALC-SCNC: 14 MMOL/L
AST SERPL-CCNC: 21 U/L
BILIRUB DIRECT SERPL-MCNC: 0.3 MG/DL
BILIRUB SERPL-MCNC: 1.3 MG/DL
BUN SERPL-MCNC: 17 MG/DL
CALCIUM SERPL-MCNC: 10 MG/DL
CHLORIDE SERPL-SCNC: 105 MMOL/L
CO2 SERPL-SCNC: 22 MMOL/L
CREAT SERPL-MCNC: 0.86 MG/DL
GLUCOSE SERPL-MCNC: 104 MG/DL
HAPTOGLOB SERPL-MCNC: 120 MG/DL
LDH SERPL-CCNC: 188 U/L
POTASSIUM SERPL-SCNC: 4.9 MMOL/L
PROT SERPL-MCNC: 8 G/DL
SODIUM SERPL-SCNC: 142 MMOL/L

## 2021-04-28 ENCOUNTER — OUTPATIENT (OUTPATIENT)
Dept: OUTPATIENT SERVICES | Facility: HOSPITAL | Age: 46
LOS: 1 days | Discharge: HOME | End: 2021-04-28

## 2021-04-28 DIAGNOSIS — C18.9 MALIGNANT NEOPLASM OF COLON, UNSPECIFIED: ICD-10-CM

## 2021-04-28 DIAGNOSIS — Z90.49 ACQUIRED ABSENCE OF OTHER SPECIFIED PARTS OF DIGESTIVE TRACT: Chronic | ICD-10-CM

## 2021-04-30 ENCOUNTER — APPOINTMENT (OUTPATIENT)
Age: 46
End: 2021-04-30

## 2021-05-13 PROBLEM — R91.1 PULMONARY NODULE: Status: ACTIVE | Noted: 2021-03-19

## 2021-05-13 NOTE — PHYSICAL EXAM
[Fully active, able to carry on all pre-disease performance without restriction] : Status 0 - Fully active, able to carry on all pre-disease performance without restriction [Normal] : affect appropriate [de-identified] : normal respiratory pattern [de-identified] : no edema

## 2021-05-13 NOTE — HISTORY OF PRESENT ILLNESS
[Home] : at home, [unfilled] , at the time of the visit. [Medical Office: ([facility])___] : at the medical office located in  [Verbal consent obtained from patient] : the patient, [unfilled] [Disease: _____________________] : Disease: [unfilled] [T: ___] : T[unfilled] [N: ___] : N[unfilled] [M: ___] : M[unfilled] [AJCC Stage: ____] : AJCC Stage: [unfilled] [de-identified] : 45 M with h/o chronic hepatitis B on entecavir presents for further management of resected high risk Stage III (T3/4N2), DAVIE R colon cancer, \par \par Bobby was admitted to [facility] on 8/2/20 with acute onset abdominal pain and fever. CT A/P showed circumferential mural thickening of the ascending colon with adjacent enlarged mesenteric LN. Primary consideration was a neoplasm. A colonoscopy on 8/4/20 showed an obstructing mass in the ascending colon. 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 On Aug 7, 2020, underwent a hemicolectomy, excised portion of small bowel. BCx from admission showed Clostridum septicum but repeat cultures were negative. Completed 2 weeks of antibiotics. Post op course otherwise was unremarkable. \par \par Referred to medical oncology for adjuvant therapy. \par \par 8/24/20: CT Chest: 4 mm LL nodule, 3 mos scan f/u to determine stability \par 9/2-2/3/21:C1-12 FOLFOX (omitted Oxaliplatin C12)\par 11/11/20 C5 (cytokine storm during Oxali infusion characterized by hypotension resolved with IVF)\par 11/14/20: CT CAP: pulm nodule unchanged, no mets\par 11/25/20: C6 (to run NS @100 ml/hr along with Oxali over 4 hours)\par 2/27/21: CT CAP: AMANDEEP\par 4/1 Tbili 1.4  US abdomen negative  [de-identified] : moderately invasive adenocarcinoma  [FreeTextEntry1] : completed 6 mos of adjuvant FOLFOX  [de-identified] : energy is back at baseline \par continues acupuncture for neuropathy, remains about the same noticeable with typing but not affecting ability to grasp or hold objects\par appetite and taste are intact\par weight is stable \par denies abdominal pain, BMs normal \par \par He is following up with Hepatology for Hep B treatment/HCC screening

## 2021-06-17 ENCOUNTER — OUTPATIENT (OUTPATIENT)
Dept: OUTPATIENT SERVICES | Facility: HOSPITAL | Age: 46
LOS: 1 days | Discharge: HOME | End: 2021-06-17

## 2021-06-17 DIAGNOSIS — Z90.49 ACQUIRED ABSENCE OF OTHER SPECIFIED PARTS OF DIGESTIVE TRACT: Chronic | ICD-10-CM

## 2021-06-17 DIAGNOSIS — C18.9 MALIGNANT NEOPLASM OF COLON, UNSPECIFIED: ICD-10-CM

## 2021-06-18 ENCOUNTER — TRANSCRIPTION ENCOUNTER (OUTPATIENT)
Age: 46
End: 2021-06-18

## 2021-06-19 ENCOUNTER — EMERGENCY (EMERGENCY)
Facility: HOSPITAL | Age: 46
LOS: 1 days | Discharge: HOME | End: 2021-06-19
Attending: EMERGENCY MEDICINE | Admitting: EMERGENCY MEDICINE
Payer: COMMERCIAL

## 2021-06-19 VITALS
TEMPERATURE: 98 F | SYSTOLIC BLOOD PRESSURE: 130 MMHG | HEIGHT: 66 IN | RESPIRATION RATE: 16 BRPM | OXYGEN SATURATION: 99 % | WEIGHT: 166.89 LBS | DIASTOLIC BLOOD PRESSURE: 92 MMHG | HEART RATE: 78 BPM

## 2021-06-19 DIAGNOSIS — Z90.49 ACQUIRED ABSENCE OF OTHER SPECIFIED PARTS OF DIGESTIVE TRACT: Chronic | ICD-10-CM

## 2021-06-19 PROCEDURE — 13132 CMPLX RPR F/C/C/M/N/AX/G/H/F: CPT

## 2021-06-19 PROCEDURE — 99284 EMERGENCY DEPT VISIT MOD MDM: CPT

## 2021-06-19 PROCEDURE — 90715 TDAP VACCINE 7 YRS/> IM: CPT

## 2021-06-19 PROCEDURE — 90471 IMMUNIZATION ADMIN: CPT

## 2021-06-19 PROCEDURE — 99284 EMERGENCY DEPT VISIT MOD MDM: CPT | Mod: 25

## 2021-06-19 RX ORDER — TETANUS TOXOID, REDUCED DIPHTHERIA TOXOID AND ACELLULAR PERTUSSIS VACCINE, ADSORBED 5; 2.5; 8; 8; 2.5 [IU]/.5ML; [IU]/.5ML; UG/.5ML; UG/.5ML; UG/.5ML
0.5 SUSPENSION INTRAMUSCULAR ONCE
Refills: 0 | Status: COMPLETED | OUTPATIENT
Start: 2021-06-19 | End: 2021-06-19

## 2021-06-19 RX ADMIN — TETANUS TOXOID, REDUCED DIPHTHERIA TOXOID AND ACELLULAR PERTUSSIS VACCINE, ADSORBED 0.5 MILLILITER(S): 5; 2.5; 8; 8; 2.5 SUSPENSION INTRAMUSCULAR at 23:32

## 2021-06-19 NOTE — ED ADULT TRIAGE NOTE - NS ED NURSE BANDS TYPE
Patient: Chino Lea  
Name band;

## 2021-06-19 NOTE — ED PROVIDER NOTE - MUSCULOSKELETAL MINIMAL EXAM
no bony tenderness to left hand with FROM of hand and digits. radial pulses equal and itnact bilaterally. no tendon exposure or laceration

## 2021-06-19 NOTE — ED PROVIDER NOTE - ATTENDING CONTRIBUTION TO CARE
46yo male who presents with left hand laceration. pt was reaching into the garbage and cut his hand on some glass no tingling or weakness, no other complaints  exam:+1cm lac to left hypothenar, neurovasc intact  plan: plastics to suture, d/c home  agree with assessment and plan of PA

## 2021-06-19 NOTE — ED ADULT NURSE NOTE - OBJECTIVE STATEMENT
Patient came in to ED c/o laceration of his left hand. Patient came in to ED c/o laceration of his left hand with a tuna can while throwing a garbage tonight.

## 2021-06-19 NOTE — ED PROVIDER NOTE - PATIENT PORTAL LINK FT
You can access the FollowMyHealth Patient Portal offered by [facility] by registering at the following website: [email or web address]. By joining [facility]’s FollowMyHealth portal, you will also be able to view your health information using other applications (apps) compatible with our system.

## 2021-06-19 NOTE — ED PROVIDER NOTE - CARE PROVIDER_API CALL
Bang Rodriguez)  Plastic Surgery  [address] [place] [address], [address] [place] [id number]  Phone: [phone]  Fax: [phone]  Follow Up Time:

## 2021-06-19 NOTE — ED ADULT NURSE NOTE - NSIMPLEMENTINTERV_GEN_ALL_ED
Implemented All Universal Safety Interventions:  [place] to call system. Call bell, personal items and telephone within reach. Instruct patient to call for assistance. Room bathroom lighting operational. Non-slip footwear when patient is off stretcher. Physically safe environment: no spills, clutter or unnecessary equipment. Stretcher in lowest position, wheels locked, appropriate side rails in place.

## 2021-06-19 NOTE — ED PROVIDER NOTE - CLINICAL SUMMARY MEDICAL DECISION MAKING FREE TEXT BOX
44 yo male with h/o colon cancer presents to the ED c/o left hand laceration occurred just prior to arrival from a tuna can while throwing out garbage. unknown tetanus. no additional complaints. bleeding controlled prior to arrival. no additional complaints. no UE weakness or paresthesias. PE: as above. A/P: tdap, patient refers plastics for wound closure, Dr. Chavez at bedside to suture lac.

## 2021-06-19 NOTE — ED ADULT TRIAGE NOTE - CHIEF COMPLAINT QUOTE
laceration to LH, reached into garbage and cut hand on metal can   tetanus is unknown/ no blood thinners

## 2021-06-19 NOTE — ED PROVIDER NOTE - OBJECTIVE STATEMENT
46 yo male with h/o colon cancer presents to the ED c/o left hand laceration occurred just prior to arrival from a tuna can while throwing out garbage. unknown tetanus. no additional complaints. bleeding controlled prior to arrival. no additional complaints. no UE weakness or paresthesias.

## 2021-06-20 VITALS
DIASTOLIC BLOOD PRESSURE: 79 MMHG | OXYGEN SATURATION: 99 % | RESPIRATION RATE: 16 BRPM | TEMPERATURE: 98 F | SYSTOLIC BLOOD PRESSURE: 131 MMHG | HEART RATE: 73 BPM

## 2021-06-21 ENCOUNTER — APPOINTMENT (OUTPATIENT)
Dept: INFUSION THERAPY | Facility: HOSPITAL | Age: 46
End: 2021-06-21

## 2021-06-21 ENCOUNTER — APPOINTMENT (OUTPATIENT)
Dept: HEMATOLOGY ONCOLOGY | Facility: CLINIC | Age: 46
End: 2021-06-21
Payer: COMMERCIAL

## 2021-06-21 ENCOUNTER — RESULT REVIEW (OUTPATIENT)
Age: 46
End: 2021-06-21

## 2021-06-21 ENCOUNTER — LABORATORY RESULT (OUTPATIENT)
Age: 46
End: 2021-06-21

## 2021-06-21 VITALS
DIASTOLIC BLOOD PRESSURE: 92 MMHG | TEMPERATURE: 97.9 F | RESPIRATION RATE: 16 BRPM | WEIGHT: 169.76 LBS | SYSTOLIC BLOOD PRESSURE: 137 MMHG | HEART RATE: 71 BPM | BODY MASS INDEX: 27.42 KG/M2 | OXYGEN SATURATION: 99 %

## 2021-06-21 LAB
BASOPHILS # BLD AUTO: 0.03 K/UL — SIGNIFICANT CHANGE UP (ref 0–0.2)
BASOPHILS NFR BLD AUTO: 0.6 % — SIGNIFICANT CHANGE UP (ref 0–2)
EOSINOPHIL # BLD AUTO: 0.07 K/UL — SIGNIFICANT CHANGE UP (ref 0–0.5)
EOSINOPHIL NFR BLD AUTO: 1.5 % — SIGNIFICANT CHANGE UP (ref 0–6)
HCT VFR BLD CALC: 39.3 % — SIGNIFICANT CHANGE UP (ref 39–50)
HGB BLD-MCNC: 13.1 G/DL — SIGNIFICANT CHANGE UP (ref 13–17)
IMM GRANULOCYTES NFR BLD AUTO: 0.2 % — SIGNIFICANT CHANGE UP (ref 0–1.5)
LYMPHOCYTES # BLD AUTO: 1.87 K/UL — SIGNIFICANT CHANGE UP (ref 1–3.3)
LYMPHOCYTES # BLD AUTO: 40.1 % — SIGNIFICANT CHANGE UP (ref 13–44)
MCHC RBC-ENTMCNC: 28.9 PG — SIGNIFICANT CHANGE UP (ref 27–34)
MCHC RBC-ENTMCNC: 33.3 G/DL — SIGNIFICANT CHANGE UP (ref 32–36)
MCV RBC AUTO: 86.6 FL — SIGNIFICANT CHANGE UP (ref 80–100)
MONOCYTES # BLD AUTO: 0.35 K/UL — SIGNIFICANT CHANGE UP (ref 0–0.9)
MONOCYTES NFR BLD AUTO: 7.5 % — SIGNIFICANT CHANGE UP (ref 2–14)
NEUTROPHILS # BLD AUTO: 2.33 K/UL — SIGNIFICANT CHANGE UP (ref 1.8–7.4)
NEUTROPHILS NFR BLD AUTO: 50.1 % — SIGNIFICANT CHANGE UP (ref 43–77)
NRBC # BLD: 0 /100 WBCS — SIGNIFICANT CHANGE UP (ref 0–0)
PLATELET # BLD AUTO: 225 K/UL — SIGNIFICANT CHANGE UP (ref 150–400)
RBC # BLD: 4.54 M/UL — SIGNIFICANT CHANGE UP (ref 4.2–5.8)
RBC # FLD: 12.8 % — SIGNIFICANT CHANGE UP (ref 10.3–14.5)
WBC # BLD: 4.66 K/UL — SIGNIFICANT CHANGE UP (ref 3.8–10.5)
WBC # FLD AUTO: 4.66 K/UL — SIGNIFICANT CHANGE UP (ref 3.8–10.5)

## 2021-06-21 PROCEDURE — 99213 OFFICE O/P EST LOW 20 MIN: CPT

## 2021-06-21 PROCEDURE — 99072 ADDL SUPL MATRL&STAF TM PHE: CPT

## 2021-06-21 NOTE — HISTORY OF PRESENT ILLNESS
[Disease: _____________________] : Disease: [unfilled] [T: ___] : T[unfilled] [N: ___] : N[unfilled] [M: ___] : M[unfilled] [AJCC Stage: ____] : AJCC Stage: [unfilled] [de-identified] : 45 M with h/o chronic hepatitis B on entecavir presents for further management of resected high risk Stage III (T3/4N2), DAVIE R colon cancer, \par \par Bobby was admitted to [facility] on 8/2/20 with acute onset abdominal pain and fever. CT A/P showed circumferential mural thickening of the ascending colon with adjacent enlarged mesenteric LN. Primary consideration was a neoplasm. A colonoscopy on 8/4/20 showed an obstructing mass in the ascending colon. 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 On Aug 7, 2020, underwent a hemicolectomy, excised portion of small bowel. BCx from admission showed Clostridum septicum but repeat cultures were negative. Completed 2 weeks of antibiotics. Post op course otherwise was unremarkable. \par \par Referred to medical oncology for adjuvant therapy. \par \par 8/24/20: CT Chest: 4 mm LL nodule, 3 mos scan f/u to determine stability \par 9/2-2/3/21:C1-12 FOLFOX (omitted Oxaliplatin C12)\par 11/11/20 C5 (cytokine storm during Oxali infusion characterized by hypotension resolved with IVF)\par 11/14/20: CT CAP: pulm nodule unchanged, no mets\par 11/25/20: C6 (to run NS @100 ml/hr along with Oxali over 4 hours)\par 2/27/21: CT CAP: AMANDEEP\par 4/1 Tbili 1.4  US abdomen negative  [de-identified] : moderately invasive adenocarcinoma  [FreeTextEntry1] : completed 6 mos of adjuvant FOLFOX  [de-identified] : continues acupuncture for neuropathy of bilateral hands/feet, remains about the same noticeable with typing but not affecting ability to grasp or hold objects\par \par appetite and taste are intact; weight is stable \par \par He is following up with Hepatology for Hep B treatment/HCC screening \par \par Scheduled for 7/28 colonoscopy -

## 2021-06-21 NOTE — PHYSICAL EXAM
[Fully active, able to carry on all pre-disease performance without restriction] : Status 0 - Fully active, able to carry on all pre-disease performance without restriction [Normal] : grossly intact [de-identified] : no edema [de-identified] : normal respiratory pattern [de-identified] : left wrist covered in bandage (C/D/I)

## 2021-07-28 ENCOUNTER — APPOINTMENT (OUTPATIENT)
Dept: HEPATOLOGY | Facility: HOSPITAL | Age: 46
End: 2021-07-28

## 2021-07-28 ENCOUNTER — OUTPATIENT (OUTPATIENT)
Dept: OUTPATIENT SERVICES | Facility: HOSPITAL | Age: 46
LOS: 1 days | Discharge: HOME | End: 2021-07-28
Payer: COMMERCIAL

## 2021-07-28 ENCOUNTER — RESULT REVIEW (OUTPATIENT)
Age: 46
End: 2021-07-28

## 2021-07-28 VITALS
HEART RATE: 72 BPM | SYSTOLIC BLOOD PRESSURE: 131 MMHG | OXYGEN SATURATION: 97 % | DIASTOLIC BLOOD PRESSURE: 83 MMHG | HEIGHT: 66 IN | RESPIRATION RATE: 15 BRPM | TEMPERATURE: 98 F | WEIGHT: 169.09 LBS

## 2021-07-28 VITALS
OXYGEN SATURATION: 100 % | RESPIRATION RATE: 17 BRPM | HEART RATE: 78 BPM | SYSTOLIC BLOOD PRESSURE: 140 MMHG | DIASTOLIC BLOOD PRESSURE: 81 MMHG

## 2021-07-28 DIAGNOSIS — Z90.49 ACQUIRED ABSENCE OF OTHER SPECIFIED PARTS OF DIGESTIVE TRACT: ICD-10-CM

## 2021-07-28 DIAGNOSIS — Z90.49 ACQUIRED ABSENCE OF OTHER SPECIFIED PARTS OF DIGESTIVE TRACT: Chronic | ICD-10-CM

## 2021-07-28 PROCEDURE — 88305 TISSUE EXAM BY PATHOLOGIST: CPT | Mod: 26

## 2021-07-28 PROCEDURE — 45380 COLONOSCOPY AND BIOPSY: CPT

## 2021-07-28 RX ORDER — SODIUM CHLORIDE 9 MG/ML
1000 INJECTION, SOLUTION INTRAVENOUS
Refills: 0 | Status: DISCONTINUED | OUTPATIENT
Start: 2021-07-28 | End: 2021-08-11

## 2021-07-28 RX ADMIN — SODIUM CHLORIDE 30 MILLILITER(S): 9 INJECTION, SOLUTION INTRAVENOUS at 08:33

## 2021-07-30 LAB — SURGICAL PATHOLOGY STUDY: SIGNIFICANT CHANGE UP

## 2021-09-17 ENCOUNTER — OUTPATIENT (OUTPATIENT)
Dept: OUTPATIENT SERVICES | Facility: HOSPITAL | Age: 46
LOS: 1 days | Discharge: HOME | End: 2021-09-17

## 2021-09-17 DIAGNOSIS — C18.9 MALIGNANT NEOPLASM OF COLON, UNSPECIFIED: ICD-10-CM

## 2021-09-17 DIAGNOSIS — Z90.49 ACQUIRED ABSENCE OF OTHER SPECIFIED PARTS OF DIGESTIVE TRACT: Chronic | ICD-10-CM

## 2021-09-20 ENCOUNTER — APPOINTMENT (OUTPATIENT)
Dept: INFUSION THERAPY | Facility: HOSPITAL | Age: 46
End: 2021-09-20

## 2021-09-20 ENCOUNTER — RESULT REVIEW (OUTPATIENT)
Age: 46
End: 2021-09-20

## 2021-09-20 ENCOUNTER — LABORATORY RESULT (OUTPATIENT)
Age: 46
End: 2021-09-20

## 2021-09-20 ENCOUNTER — APPOINTMENT (OUTPATIENT)
Dept: HEMATOLOGY ONCOLOGY | Facility: CLINIC | Age: 46
End: 2021-09-20
Payer: COMMERCIAL

## 2021-09-20 VITALS
WEIGHT: 171.3 LBS | RESPIRATION RATE: 16 BRPM | OXYGEN SATURATION: 99 % | HEART RATE: 70 BPM | TEMPERATURE: 98 F | BODY MASS INDEX: 27.67 KG/M2 | DIASTOLIC BLOOD PRESSURE: 86 MMHG | SYSTOLIC BLOOD PRESSURE: 130 MMHG

## 2021-09-20 LAB
BASOPHILS # BLD AUTO: 0.04 K/UL — SIGNIFICANT CHANGE UP (ref 0–0.2)
BASOPHILS NFR BLD AUTO: 0.7 % — SIGNIFICANT CHANGE UP (ref 0–2)
EOSINOPHIL # BLD AUTO: 0.11 K/UL — SIGNIFICANT CHANGE UP (ref 0–0.5)
EOSINOPHIL NFR BLD AUTO: 1.9 % — SIGNIFICANT CHANGE UP (ref 0–6)
HCT VFR BLD CALC: 38 % — LOW (ref 39–50)
HGB BLD-MCNC: 12.8 G/DL — LOW (ref 13–17)
IMM GRANULOCYTES NFR BLD AUTO: 0.5 % — SIGNIFICANT CHANGE UP (ref 0–1.5)
LYMPHOCYTES # BLD AUTO: 1.95 K/UL — SIGNIFICANT CHANGE UP (ref 1–3.3)
LYMPHOCYTES # BLD AUTO: 32.9 % — SIGNIFICANT CHANGE UP (ref 13–44)
MCHC RBC-ENTMCNC: 29.6 PG — SIGNIFICANT CHANGE UP (ref 27–34)
MCHC RBC-ENTMCNC: 33.7 G/DL — SIGNIFICANT CHANGE UP (ref 32–36)
MCV RBC AUTO: 87.8 FL — SIGNIFICANT CHANGE UP (ref 80–100)
MONOCYTES # BLD AUTO: 0.35 K/UL — SIGNIFICANT CHANGE UP (ref 0–0.9)
MONOCYTES NFR BLD AUTO: 5.9 % — SIGNIFICANT CHANGE UP (ref 2–14)
NEUTROPHILS # BLD AUTO: 3.44 K/UL — SIGNIFICANT CHANGE UP (ref 1.8–7.4)
NEUTROPHILS NFR BLD AUTO: 58.1 % — SIGNIFICANT CHANGE UP (ref 43–77)
NRBC # BLD: 0 /100 WBCS — SIGNIFICANT CHANGE UP (ref 0–0)
PLATELET # BLD AUTO: 219 K/UL — SIGNIFICANT CHANGE UP (ref 150–400)
RBC # BLD: 4.33 M/UL — SIGNIFICANT CHANGE UP (ref 4.2–5.8)
RBC # FLD: 12.4 % — SIGNIFICANT CHANGE UP (ref 10.3–14.5)
WBC # BLD: 5.92 K/UL — SIGNIFICANT CHANGE UP (ref 3.8–10.5)
WBC # FLD AUTO: 5.92 K/UL — SIGNIFICANT CHANGE UP (ref 3.8–10.5)

## 2021-09-20 PROCEDURE — 99213 OFFICE O/P EST LOW 20 MIN: CPT

## 2021-09-20 NOTE — HISTORY OF PRESENT ILLNESS
[Disease: _____________________] : Disease: [unfilled] [T: ___] : T[unfilled] [N: ___] : N[unfilled] [M: ___] : M[unfilled] [AJCC Stage: ____] : AJCC Stage: [unfilled] [de-identified] : 45 M with h/o chronic hepatitis B on entecavir presents for further management of resected high risk Stage III (T3/4N2), DAVIE R colon cancer, \par \par Bobby was admitted to [facility] on 8/2/20 with acute onset abdominal pain and fever. CT A/P showed circumferential mural thickening of the ascending colon with adjacent enlarged mesenteric LN. Primary consideration was a neoplasm. A colonoscopy on 8/4/20 showed an obstructing mass in the ascending colon. 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 On Aug 7, 2020, underwent a hemicolectomy, excised portion of small bowel. BCx from admission showed Clostridum septicum but repeat cultures were negative. Completed 2 weeks of antibiotics. Post op course otherwise was unremarkable. \par \par Referred to medical oncology for adjuvant therapy. \par \par 8/24/20: CT Chest: 4 mm LL nodule, 3 mos scan f/u to determine stability \par 9/2-2/3/21:C1-12 FOLFOX (omitted Oxaliplatin C12)\par 11/11/20 C5 (cytokine storm during Oxali infusion characterized by hypotension resolved with IVF)\par 11/14/20: CT CAP: pulm nodule unchanged, no mets\par 11/25/20: C6 (to run NS @100 ml/hr along with Oxali over 4 hours)\par 2/27/21: CT CAP: AMANDEEP\par 7/31/21: [place]: AMANDEEP \par  [de-identified] : moderately invasive adenocarcinoma  [de-identified] : overall feels well. Denies any c/o. peripheral neuropathy is stable. had phil in july, AMANDEEP.  [FreeTextEntry1] : surveillance

## 2021-09-22 ENCOUNTER — NON-APPOINTMENT (OUTPATIENT)
Age: 46
End: 2021-09-22

## 2021-10-01 ENCOUNTER — APPOINTMENT (OUTPATIENT)
Dept: ULTRASOUND IMAGING | Facility: CLINIC | Age: 46
End: 2021-10-01
Payer: COMMERCIAL

## 2021-10-01 ENCOUNTER — OUTPATIENT (OUTPATIENT)
Dept: OUTPATIENT SERVICES | Facility: HOSPITAL | Age: 46
LOS: 1 days | End: 2021-10-01
Payer: COMMERCIAL

## 2021-10-01 DIAGNOSIS — B19.10 UNSPECIFIED VIRAL HEPATITIS B WITHOUT HEPATIC COMA: ICD-10-CM

## 2021-10-01 DIAGNOSIS — Z00.8 ENCOUNTER FOR OTHER GENERAL EXAMINATION: ICD-10-CM

## 2021-10-01 DIAGNOSIS — Z90.49 ACQUIRED ABSENCE OF OTHER SPECIFIED PARTS OF DIGESTIVE TRACT: Chronic | ICD-10-CM

## 2021-10-01 LAB
AFP-TM SERPL-MCNC: 2.4 NG/ML
ALBUMIN SERPL ELPH-MCNC: 4.7 G/DL
ALP BLD-CCNC: 83 U/L
ALT SERPL-CCNC: 25 U/L
ANION GAP SERPL CALC-SCNC: 15 MMOL/L
AST SERPL-CCNC: 18 U/L
BASOPHILS # BLD AUTO: 0.04 K/UL
BASOPHILS NFR BLD AUTO: 0.5 %
BILIRUB SERPL-MCNC: 1 MG/DL
BUN SERPL-MCNC: 21 MG/DL
CALCIUM SERPL-MCNC: 9.6 MG/DL
CHLORIDE SERPL-SCNC: 105 MMOL/L
CHOLEST SERPL-MCNC: 129 MG/DL
CO2 SERPL-SCNC: 22 MMOL/L
CREAT SERPL-MCNC: 1.05 MG/DL
EOSINOPHIL # BLD AUTO: 0.11 K/UL
EOSINOPHIL NFR BLD AUTO: 1.5 %
ESTIMATED AVERAGE GLUCOSE: 111 MG/DL
GLUCOSE SERPL-MCNC: 107 MG/DL
HBA1C MFR BLD HPLC: 5.5 %
HCT VFR BLD CALC: 41.7 %
HDLC SERPL-MCNC: 37 MG/DL
HGB BLD-MCNC: 13.4 G/DL
IMM GRANULOCYTES NFR BLD AUTO: 0.3 %
LDLC SERPL CALC-MCNC: 16 MG/DL
LYMPHOCYTES # BLD AUTO: 2.25 K/UL
LYMPHOCYTES NFR BLD AUTO: 30.2 %
MAN DIFF?: NORMAL
MCHC RBC-ENTMCNC: 29.1 PG
MCHC RBC-ENTMCNC: 32.1 GM/DL
MCV RBC AUTO: 90.5 FL
MONOCYTES # BLD AUTO: 0.47 K/UL
MONOCYTES NFR BLD AUTO: 6.3 %
NEUTROPHILS # BLD AUTO: 4.55 K/UL
NEUTROPHILS NFR BLD AUTO: 61.2 %
NONHDLC SERPL-MCNC: 92 MG/DL
PLATELET # BLD AUTO: 267 K/UL
POTASSIUM SERPL-SCNC: 4.8 MMOL/L
PROT SERPL-MCNC: 7.2 G/DL
RBC # BLD: 4.61 M/UL
RBC # FLD: 12.9 %
SODIUM SERPL-SCNC: 143 MMOL/L
TRIGL SERPL-MCNC: 377 MG/DL
WBC # FLD AUTO: 7.44 K/UL

## 2021-10-01 PROCEDURE — 76700 US EXAM ABDOM COMPLETE: CPT

## 2021-10-01 PROCEDURE — 76700 US EXAM ABDOM COMPLETE: CPT | Mod: 26

## 2021-10-06 LAB
HBV DNA # SERPL NAA+PROBE: NOT DETECTED IU/ML
HEPB DNA PCR LOG: NOT DETECTED LOG10IU/ML

## 2021-10-11 ENCOUNTER — APPOINTMENT (OUTPATIENT)
Dept: HEPATOLOGY | Facility: CLINIC | Age: 46
End: 2021-10-11
Payer: COMMERCIAL

## 2021-10-11 VITALS
HEART RATE: 73 BPM | TEMPERATURE: 97.8 F | SYSTOLIC BLOOD PRESSURE: 117 MMHG | WEIGHT: 169 LBS | HEIGHT: 65.98 IN | DIASTOLIC BLOOD PRESSURE: 80 MMHG | BODY MASS INDEX: 27.16 KG/M2

## 2021-10-11 PROCEDURE — 99214 OFFICE O/P EST MOD 30 MIN: CPT

## 2021-10-11 RX ORDER — SODIUM SULFATE, POTASSIUM SULFATE, MAGNESIUM SULFATE 17.5; 3.13; 1.6 G/ML; G/ML; G/ML
17.5-3.13-1.6 SOLUTION, CONCENTRATE ORAL
Qty: 1 | Refills: 0 | Status: COMPLETED | COMMUNITY
Start: 2021-06-24 | End: 2021-10-11

## 2021-10-11 NOTE — HISTORY OF PRESENT ILLNESS
[de-identified] : - 10/011/21: returns after bloodwork and US abdomen. Doing well.\par \par - 4/16/21: doing well, returns after labs and US abdomen.\par \par - 1/4/20: returns after fibroscan, US and CT abdomen. Reports slight tingling on fingers, otherwise feels normal.\par \par - Mr. KIM is a 45 year old man with chronic hepatitis B, on entecavir since about 2008, HTN, HLD, recent hospitalization 8/-2-11/2020 with C. 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 Used to weigh 177 lbs prior to that for many years.\par Alcohol hx: does not drink, never drank heavily. Shx: [place] 8 from [facility]\par \par Workup:\par - 1/4/21 fibroscan: 4.3 kPa, 260 dB/m - F0, S1 - no fibrosis, mild steatosis\par - 8/2/20 CT abdomen: fatty liver, Circumferential mural thickening of the ascending colon with adjacent enlarged mesenteric lymph nodes. Primary consideration is neoplasm. Colitis is considered less likely.

## 2021-10-11 NOTE — ASSESSMENT
[FreeTextEntry1] : Mr. KIM is a 46 year old man with chronic hepatitis B, HTN, HLD, hospitalization 8/-2-11/2020 with C. 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 Colonosocpy 7/2021: unremarkable anastomosis, no polyps. Biopsies: mild inflammation at antastomosis. \par - hepatitis B, on entecavir since 2008, ([facility] until 2016), with negative HBV PCR. HBsAg and HBcAb positive. HBsAb(-)\par - HCC screening: [place] 10/01/21 negative\par - overweight, BMI 26.5 after 20 lbs weight loss since surgery - was likely beneficial for his fatty liver\par - AST/ALT normal in 12/2020 - ALT was mildly elev. in 8/2020. ALP elevated 169 U/L was normal in 8/2020\par - normocytic anemia, Hb 11.5, MCV 84\par - NAFLD, may have SHEA - fatty liver seen on CT 8/02/20\par    - in past elevated ALP and/or AST/ALT\par    - fibroscan 1/2021: mild steatosis (S1 of 3), no fibrosis\par \par - naive to hepatitis A and C. Can have non-live vaccines\par - wife was vaccinated against hepatitis B. Sister is 50 and needs colon cancer screening - discussed\par \par Plan:\par - hepatitis B: continue entecavir, refilled at specialty pharmacy\par - HCC screening and repeat labs every 6 months, in 4/2021, return after that\par - colonoscopy after 1 year, in July\par - should loose some weight, by calorie-restricted diet and exercise\par

## 2021-12-16 ENCOUNTER — OUTPATIENT (OUTPATIENT)
Dept: OUTPATIENT SERVICES | Facility: HOSPITAL | Age: 46
LOS: 1 days | Discharge: HOME | End: 2021-12-16

## 2021-12-16 DIAGNOSIS — Z90.49 ACQUIRED ABSENCE OF OTHER SPECIFIED PARTS OF DIGESTIVE TRACT: Chronic | ICD-10-CM

## 2021-12-16 DIAGNOSIS — C18.9 MALIGNANT NEOPLASM OF COLON, UNSPECIFIED: ICD-10-CM

## 2021-12-20 ENCOUNTER — RESULT REVIEW (OUTPATIENT)
Age: 46
End: 2021-12-20

## 2021-12-20 ENCOUNTER — APPOINTMENT (OUTPATIENT)
Dept: HEMATOLOGY ONCOLOGY | Facility: CLINIC | Age: 46
End: 2021-12-20
Payer: COMMERCIAL

## 2021-12-20 ENCOUNTER — APPOINTMENT (OUTPATIENT)
Dept: INFUSION THERAPY | Facility: HOSPITAL | Age: 46
End: 2021-12-20

## 2021-12-20 VITALS
TEMPERATURE: 97.9 F | HEART RATE: 82 BPM | BODY MASS INDEX: 28.13 KG/M2 | OXYGEN SATURATION: 98 % | DIASTOLIC BLOOD PRESSURE: 86 MMHG | SYSTOLIC BLOOD PRESSURE: 128 MMHG | RESPIRATION RATE: 14 BRPM | WEIGHT: 174.17 LBS

## 2021-12-20 DIAGNOSIS — G62.0 DRUG-INDUCED POLYNEUROPATHY: ICD-10-CM

## 2021-12-20 DIAGNOSIS — T45.1X5A DRUG-INDUCED POLYNEUROPATHY: ICD-10-CM

## 2021-12-20 LAB
ALBUMIN SERPL ELPH-MCNC: 4.7 G/DL — SIGNIFICANT CHANGE UP (ref 3.3–5)
ALP SERPL-CCNC: 71 U/L — SIGNIFICANT CHANGE UP (ref 40–120)
ALT FLD-CCNC: 18 U/L — SIGNIFICANT CHANGE UP (ref 10–45)
ANION GAP SERPL CALC-SCNC: 12 MMOL/L — SIGNIFICANT CHANGE UP (ref 5–17)
AST SERPL-CCNC: 19 U/L — SIGNIFICANT CHANGE UP (ref 10–40)
BILIRUB SERPL-MCNC: 0.7 MG/DL — SIGNIFICANT CHANGE UP (ref 0.2–1.2)
BUN SERPL-MCNC: 22 MG/DL — SIGNIFICANT CHANGE UP (ref 7–23)
CALCIUM SERPL-MCNC: 9.2 MG/DL — SIGNIFICANT CHANGE UP (ref 8.4–10.5)
CEA SERPL-MCNC: 1.8 NG/ML — SIGNIFICANT CHANGE UP (ref 0–3.8)
CHLORIDE SERPL-SCNC: 106 MMOL/L — SIGNIFICANT CHANGE UP (ref 96–108)
CO2 SERPL-SCNC: 22 MMOL/L — SIGNIFICANT CHANGE UP (ref 22–31)
CREAT SERPL-MCNC: 0.83 MG/DL — SIGNIFICANT CHANGE UP (ref 0.5–1.3)
GLUCOSE SERPL-MCNC: 109 MG/DL — HIGH (ref 70–99)
HCT VFR BLD CALC: 36.7 % — LOW (ref 39–50)
HGB BLD-MCNC: 12.1 G/DL — LOW (ref 13–17)
MCHC RBC-ENTMCNC: 28.9 PG — SIGNIFICANT CHANGE UP (ref 27–34)
MCHC RBC-ENTMCNC: 33 G/DL — SIGNIFICANT CHANGE UP (ref 32–36)
MCV RBC AUTO: 87.8 FL — SIGNIFICANT CHANGE UP (ref 80–100)
PLATELET # BLD AUTO: 227 K/UL — SIGNIFICANT CHANGE UP (ref 150–400)
POTASSIUM SERPL-MCNC: 4.4 MMOL/L — SIGNIFICANT CHANGE UP (ref 3.5–5.3)
POTASSIUM SERPL-SCNC: 4.4 MMOL/L — SIGNIFICANT CHANGE UP (ref 3.5–5.3)
PROT SERPL-MCNC: 7.2 G/DL — SIGNIFICANT CHANGE UP (ref 6–8.3)
RBC # BLD: 4.18 M/UL — LOW (ref 4.2–5.8)
RBC # FLD: 12.8 % — SIGNIFICANT CHANGE UP (ref 10.3–14.5)
SODIUM SERPL-SCNC: 139 MMOL/L — SIGNIFICANT CHANGE UP (ref 135–145)
WBC # BLD: 5.35 K/UL — SIGNIFICANT CHANGE UP (ref 3.8–10.5)
WBC # FLD AUTO: 5.35 K/UL — SIGNIFICANT CHANGE UP (ref 3.8–10.5)

## 2021-12-20 PROCEDURE — 99213 OFFICE O/P EST LOW 20 MIN: CPT

## 2021-12-20 NOTE — HISTORY OF PRESENT ILLNESS
[Disease: _____________________] : Disease: [unfilled] [T: ___] : T[unfilled] [N: ___] : N[unfilled] [M: ___] : M[unfilled] [AJCC Stage: ____] : AJCC Stage: [unfilled] [de-identified] : 45 M with h/o chronic hepatitis B on entecavir presents for further management of resected high risk Stage III (T3/4N2), DAVIE R colon cancer, \par \par Bobby was admitted to [facility] on 8/2/20 with acute onset abdominal pain and fever. CT A/P showed circumferential mural thickening of the ascending colon with adjacent enlarged mesenteric LN. Primary consideration was a neoplasm. A colonoscopy on 8/4/20 showed an obstructing mass in the ascending colon. 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 On Aug 7, 2020, underwent a hemicolectomy, excised portion of small bowel. BCx from admission showed Clostridum septicum but repeat cultures were negative. Completed 2 weeks of antibiotics. Post op course otherwise was unremarkable. \par \par Referred to medical oncology for adjuvant therapy. \par \par 8/24/20: CT Chest: 4 mm LL nodule, 3 mos scan f/u to determine stability \par 9/2-2/3/21:C1-12 FOLFOX (omitted Oxaliplatin C12)\par 11/11/20 C5 (cytokine storm during Oxali infusion characterized by hypotension resolved with IVF)\par 11/14/20: CT CAP: pulm nodule unchanged, no mets\par 11/25/20: C6 (to run NS @100 ml/hr along with Oxali over 4 hours)\par 2/27/21: CT CAP: AMANDEEP\par 7/31/21: [place]: AMANDEEP \par  [de-identified] : moderately invasive adenocarcinoma  [FreeTextEntry1] : surveillance  [de-identified] : overall feels well. did not do acupuncture for 1 mos and noticed worsening of peripheral neuropathy. Still mild, does not interfere with ADLs. otherwise normal BMs.

## 2022-02-18 ENCOUNTER — OUTPATIENT (OUTPATIENT)
Dept: OUTPATIENT SERVICES | Facility: HOSPITAL | Age: 47
LOS: 1 days | End: 2022-02-18
Payer: COMMERCIAL

## 2022-02-18 ENCOUNTER — APPOINTMENT (OUTPATIENT)
Dept: CT IMAGING | Facility: CLINIC | Age: 47
End: 2022-02-18
Payer: COMMERCIAL

## 2022-02-18 ENCOUNTER — RX RENEWAL (OUTPATIENT)
Age: 47
End: 2022-02-18

## 2022-02-18 DIAGNOSIS — Z90.49 ACQUIRED ABSENCE OF OTHER SPECIFIED PARTS OF DIGESTIVE TRACT: Chronic | ICD-10-CM

## 2022-02-18 DIAGNOSIS — C18.9 MALIGNANT NEOPLASM OF COLON, UNSPECIFIED: ICD-10-CM

## 2022-02-18 PROCEDURE — 71260 CT THORAX DX C+: CPT | Mod: 26

## 2022-02-18 PROCEDURE — 71260 CT THORAX DX C+: CPT

## 2022-02-18 PROCEDURE — 74177 CT ABD & PELVIS W/CONTRAST: CPT | Mod: 26

## 2022-02-18 PROCEDURE — 74177 CT ABD & PELVIS W/CONTRAST: CPT

## 2022-02-18 PROCEDURE — 82565 ASSAY OF CREATININE: CPT

## 2022-02-26 LAB
ALBUMIN SERPL ELPH-MCNC: 5.2 G/DL
ALP BLD-CCNC: 75 U/L
ALT SERPL-CCNC: 40 U/L
ANION GAP SERPL CALC-SCNC: 13 MMOL/L
APTT BLD: 34.5 SEC
AST SERPL-CCNC: 24 U/L
BASOPHILS # BLD AUTO: 0.04 K/UL
BASOPHILS NFR BLD AUTO: 0.7 %
BILIRUB SERPL-MCNC: 0.9 MG/DL
BUN SERPL-MCNC: 18 MG/DL
CALCIUM SERPL-MCNC: 9.8 MG/DL
CHLORIDE SERPL-SCNC: 103 MMOL/L
CO2 SERPL-SCNC: 24 MMOL/L
CREAT SERPL-MCNC: 0.83 MG/DL
EOSINOPHIL # BLD AUTO: 0.17 K/UL
EOSINOPHIL NFR BLD AUTO: 3.1 %
GLUCOSE SERPL-MCNC: 123 MG/DL
HCT VFR BLD CALC: 44.8 %
HGB BLD-MCNC: 14.1 G/DL
IMM GRANULOCYTES NFR BLD AUTO: 0.4 %
INR PPP: 0.86 RATIO
LYMPHOCYTES # BLD AUTO: 1.86 K/UL
LYMPHOCYTES NFR BLD AUTO: 33.6 %
MAN DIFF?: NORMAL
MCHC RBC-ENTMCNC: 28.5 PG
MCHC RBC-ENTMCNC: 31.5 GM/DL
MCV RBC AUTO: 90.5 FL
MONOCYTES # BLD AUTO: 0.38 K/UL
MONOCYTES NFR BLD AUTO: 6.9 %
NEUTROPHILS # BLD AUTO: 3.07 K/UL
NEUTROPHILS NFR BLD AUTO: 55.3 %
PLATELET # BLD AUTO: 258 K/UL
POTASSIUM SERPL-SCNC: 4.7 MMOL/L
PROT SERPL-MCNC: 7.5 G/DL
PT BLD: 10 SEC
RBC # BLD: 4.95 M/UL
RBC # FLD: 13.3 %
SODIUM SERPL-SCNC: 141 MMOL/L
WBC # FLD AUTO: 5.54 K/UL

## 2022-03-09 ENCOUNTER — OUTPATIENT (OUTPATIENT)
Dept: OUTPATIENT SERVICES | Facility: HOSPITAL | Age: 47
LOS: 1 days | End: 2022-03-09
Payer: COMMERCIAL

## 2022-03-09 DIAGNOSIS — Z90.49 ACQUIRED ABSENCE OF OTHER SPECIFIED PARTS OF DIGESTIVE TRACT: Chronic | ICD-10-CM

## 2022-03-09 DIAGNOSIS — Z11.52 ENCOUNTER FOR SCREENING FOR COVID-19: ICD-10-CM

## 2022-03-09 LAB — SARS-COV-2 RNA SPEC QL NAA+PROBE: SIGNIFICANT CHANGE UP

## 2022-03-09 PROCEDURE — U0003: CPT

## 2022-03-09 PROCEDURE — C9803: CPT

## 2022-03-09 PROCEDURE — U0005: CPT

## 2022-03-11 ENCOUNTER — OUTPATIENT (OUTPATIENT)
Dept: OUTPATIENT SERVICES | Facility: HOSPITAL | Age: 47
LOS: 1 days | End: 2022-03-11
Payer: COMMERCIAL

## 2022-03-11 ENCOUNTER — RESULT REVIEW (OUTPATIENT)
Age: 47
End: 2022-03-11

## 2022-03-11 VITALS
DIASTOLIC BLOOD PRESSURE: 84 MMHG | RESPIRATION RATE: 16 BRPM | HEART RATE: 90 BPM | SYSTOLIC BLOOD PRESSURE: 124 MMHG | OXYGEN SATURATION: 98 %

## 2022-03-11 VITALS
DIASTOLIC BLOOD PRESSURE: 72 MMHG | HEART RATE: 82 BPM | OXYGEN SATURATION: 99 % | SYSTOLIC BLOOD PRESSURE: 115 MMHG | RESPIRATION RATE: 15 BRPM

## 2022-03-11 DIAGNOSIS — Z90.49 ACQUIRED ABSENCE OF OTHER SPECIFIED PARTS OF DIGESTIVE TRACT: Chronic | ICD-10-CM

## 2022-03-11 DIAGNOSIS — C18.9 MALIGNANT NEOPLASM OF COLON, UNSPECIFIED: ICD-10-CM

## 2022-03-11 PROCEDURE — 36590 REMOVAL TUNNELED CV CATH: CPT

## 2022-03-11 PROCEDURE — 77001 FLUOROGUIDE FOR VEIN DEVICE: CPT

## 2022-03-11 PROCEDURE — 77001 FLUOROGUIDE FOR VEIN DEVICE: CPT | Mod: 26

## 2022-03-11 RX ORDER — ENTECAVIR 0.5 MG/1
1 TABLET ORAL
Qty: 0 | Refills: 0 | DISCHARGE

## 2022-03-11 RX ORDER — AMLODIPINE AND VALSARTAN 5; 320 MG/1; MG/1
1 TABLET, FILM COATED ORAL
Qty: 0 | Refills: 0 | DISCHARGE

## 2022-03-11 RX ORDER — ROSUVASTATIN CALCIUM 5 MG/1
1 TABLET ORAL
Qty: 0 | Refills: 0 | DISCHARGE

## 2022-03-11 NOTE — ASU PREOP CHECKLIST - HEART RATE (BEATS/MIN)
PROVIDER:[TOKEN:[[id number]],FOLLOWUP:[4-6 Days]],PROVIDER:[TOKEN:[[id number]],FOLLOWUP:[4-6 Days]]
90

## 2022-03-11 NOTE — ASU DISCHARGE PLAN (ADULT/PEDIATRIC) - NURSING INSTRUCTIONS
Please feel free to contact us at [phone] if any problems arise. After 6PM, Monday through Friday, on weekends and on holidays, please call [phone] and ask for the radiology resident on call to be paged.

## 2022-03-11 NOTE — ASU PATIENT PROFILE, ADULT - FALL HARM RISK - UNIVERSAL INTERVENTIONS
Bed in lowest position, wheels locked, appropriate side rails in place/Call bell, personal items and telephone in reach/Instruct patient to call for assistance before getting out of bed or chair/Non-slip footwear when patient is out of bed/[place] to call system/Physically safe environment - no spills, clutter or unnecessary equipment/Purposeful Proactive Rounding/Room/bathroom lighting operational, light cord in reach

## 2022-03-11 NOTE — ASU DISCHARGE PLAN (ADULT/PEDIATRIC) - ASU DC SPECIAL INSTRUCTIONSFT
Central venous catheter Removal    Discharge Instructions  - You have had a central venous catheter removed.  - You may shower in 48 hours. No soaking or swimming until the site is completely healed.  - Keep the area covered and dry for the next 48 hours.  - Do not perform any heavy lifting for the next few days or until the site is healed.  - You may resume your normal diet.  - It is normal to experience some pain over the site for the next few days. You may take apply ice to the area (20 minutes on, 20 minutes off) and take Tylenol for that pain. Do not take more frequently than every 6 hours and do not exceed more than 3000mg of Tylenol in a 24 hour period.    Notify your primary physician and/or Interventional Radiology IMMEDIATELY if you experience any of the following       - Fever of 101F or 38C       - Chills or Rigors/ Shakes       - Swelling and/or Redness in the area around the catheter removal site       - Worsening Pain       - Blood soaked bandages or worsening bleeding       - Lightheadedness and/or dizziness upon standing       - Chest Pain/ Tightness       - Shortness of Breath       - Difficulty walking    If you have a problem that you believe requires IMMEDIATE attention, please go to your NEAREST Emergency Room. If you believe your problem can safely wait until you speak to a physician, please call Interventional Radiology for any concerns.    Please feel free to contact us at [phone] if any problems arise. After 6PM, Monday through Friday, on weekends and on holidays, please call [phone] and ask for the radiology resident on call to be paged.

## 2022-03-11 NOTE — PRE PROCEDURE NOTE - PRE PROCEDURE EVALUATION
Interventional Radiology    HPI: 45y Male with PMH of colon CA s/p chest port placement in 8/202 now completed chemotherapy here for port removal.      NPO past midnight.     Allergies: ibuprofen (Other)  ibuprofen (Rash)    Medications (Abx/Cardiac/Anticoagulation/Blood Products)      Data:    T(C): --  HR: 90  BP: 124/84  RR: 16  SpO2: 98%    Exam  General: No acute distress  Chest: Non labored breathing  Abdomen: Non-distended  Extremities: No swelling, warm          Imaging:     Plan: 46y Male presents for chest port removal.   -Risks/Benefits/alternatives explained with the patient  and witnessed informed consent obtained.

## 2022-03-11 NOTE — ASU DISCHARGE PLAN (ADULT/PEDIATRIC) - NS MD DC FALL RISK RISK
For information on Fall & Injury Prevention, visit: [email or web address] OR  [email or web address] OR  https://www.cdc.gov/steadi/patient.html

## 2022-03-14 DIAGNOSIS — Z45.2 ENCOUNTER FOR ADJUSTMENT AND MANAGEMENT OF VASCULAR ACCESS DEVICE: ICD-10-CM

## 2022-03-17 ENCOUNTER — OUTPATIENT (OUTPATIENT)
Dept: OUTPATIENT SERVICES | Facility: HOSPITAL | Age: 47
LOS: 1 days | Discharge: HOME | End: 2022-03-17

## 2022-03-17 DIAGNOSIS — Z90.49 ACQUIRED ABSENCE OF OTHER SPECIFIED PARTS OF DIGESTIVE TRACT: Chronic | ICD-10-CM

## 2022-03-17 DIAGNOSIS — C18.9 MALIGNANT NEOPLASM OF COLON, UNSPECIFIED: ICD-10-CM

## 2022-03-21 ENCOUNTER — RESULT REVIEW (OUTPATIENT)
Age: 47
End: 2022-03-21

## 2022-03-21 ENCOUNTER — APPOINTMENT (OUTPATIENT)
Dept: HEMATOLOGY ONCOLOGY | Facility: CLINIC | Age: 47
End: 2022-03-21
Payer: COMMERCIAL

## 2022-03-21 VITALS
HEART RATE: 78 BPM | SYSTOLIC BLOOD PRESSURE: 125 MMHG | DIASTOLIC BLOOD PRESSURE: 84 MMHG | BODY MASS INDEX: 28.84 KG/M2 | TEMPERATURE: 97.2 F | RESPIRATION RATE: 14 BRPM | OXYGEN SATURATION: 98 % | WEIGHT: 178.57 LBS

## 2022-03-21 LAB
BASOPHILS # BLD AUTO: 0.09 K/UL — SIGNIFICANT CHANGE UP (ref 0–0.2)
BASOPHILS NFR BLD AUTO: 1.5 % — SIGNIFICANT CHANGE UP (ref 0–2)
CEA SERPL-MCNC: 2.2 NG/ML
EOSINOPHIL # BLD AUTO: 0.37 K/UL — SIGNIFICANT CHANGE UP (ref 0–0.5)
EOSINOPHIL NFR BLD AUTO: 6.3 % — HIGH (ref 0–6)
HCT VFR BLD CALC: 39 % — SIGNIFICANT CHANGE UP (ref 39–50)
HGB BLD-MCNC: 13.1 G/DL — SIGNIFICANT CHANGE UP (ref 13–17)
IMM GRANULOCYTES NFR BLD AUTO: 0.2 % — SIGNIFICANT CHANGE UP (ref 0–1.5)
LYMPHOCYTES # BLD AUTO: 1.89 K/UL — SIGNIFICANT CHANGE UP (ref 1–3.3)
LYMPHOCYTES # BLD AUTO: 32.2 % — SIGNIFICANT CHANGE UP (ref 13–44)
MCHC RBC-ENTMCNC: 29.1 PG — SIGNIFICANT CHANGE UP (ref 27–34)
MCHC RBC-ENTMCNC: 33.6 G/DL — SIGNIFICANT CHANGE UP (ref 32–36)
MCV RBC AUTO: 86.7 FL — SIGNIFICANT CHANGE UP (ref 80–100)
MONOCYTES # BLD AUTO: 0.37 K/UL — SIGNIFICANT CHANGE UP (ref 0–0.9)
MONOCYTES NFR BLD AUTO: 6.3 % — SIGNIFICANT CHANGE UP (ref 2–14)
NEUTROPHILS # BLD AUTO: 3.14 K/UL — SIGNIFICANT CHANGE UP (ref 1.8–7.4)
NEUTROPHILS NFR BLD AUTO: 53.5 % — SIGNIFICANT CHANGE UP (ref 43–77)
NRBC # BLD: 0 /100 WBCS — SIGNIFICANT CHANGE UP (ref 0–0)
PLATELET # BLD AUTO: 239 K/UL — SIGNIFICANT CHANGE UP (ref 150–400)
RBC # BLD: 4.5 M/UL — SIGNIFICANT CHANGE UP (ref 4.2–5.8)
RBC # FLD: 13 % — SIGNIFICANT CHANGE UP (ref 10.3–14.5)
WBC # BLD: 5.87 K/UL — SIGNIFICANT CHANGE UP (ref 3.8–10.5)
WBC # FLD AUTO: 5.87 K/UL — SIGNIFICANT CHANGE UP (ref 3.8–10.5)

## 2022-03-21 PROCEDURE — 99213 OFFICE O/P EST LOW 20 MIN: CPT

## 2022-03-21 NOTE — HISTORY OF PRESENT ILLNESS
[Disease: _____________________] : Disease: [unfilled] [T: ___] : T[unfilled] [N: ___] : N[unfilled] [M: ___] : M[unfilled] [AJCC Stage: ____] : AJCC Stage: [unfilled] [de-identified] : 45 M with h/o chronic hepatitis B on entecavir presents for further management of resected high risk Stage III (T3/4N2), DAVIE R colon cancer, \par \par Bobby was admitted to [facility] on 8/2/20 with acute onset abdominal pain and fever. CT A/P showed circumferential mural thickening of the ascending colon with adjacent enlarged mesenteric LN. Primary consideration was a neoplasm. A colonoscopy on 8/4/20 showed an obstructing mass in the ascending colon. 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 On Aug 7, 2020, underwent a hemicolectomy, excised portion of small bowel. BCx from admission showed Clostridum septicum but repeat cultures were negative. Completed 2 weeks of antibiotics. Post op course otherwise was unremarkable. \par \par Referred to medical oncology for adjuvant therapy. \par \par 8/24/20: CT Chest: 4 mm LL nodule, 3 mos scan f/u to determine stability \par 9/2-2/3/21:C1-12 FOLFOX (omitted Oxaliplatin C12)\par 11/11/20 C5 (cytokine storm during Oxali infusion characterized by hypotension resolved with IVF)\par 11/14/20: CT CAP: pulm nodule unchanged, no mets\par 11/25/20: C6 (to run NS @100 ml/hr along with Oxali over 4 hours)\par 2/27/21: CT CAP: AMANDEEP\par 7/31/21: [place]: AMANDEEP \par  [de-identified] : moderately invasive adenocarcinoma  [FreeTextEntry1] : surveillance  [de-identified] : overall feel well. denies any c/o. not as active as he wants to be. mediport removed.

## 2022-04-12 ENCOUNTER — APPOINTMENT (OUTPATIENT)
Dept: ULTRASOUND IMAGING | Facility: CLINIC | Age: 47
End: 2022-04-12
Payer: COMMERCIAL

## 2022-04-12 ENCOUNTER — OUTPATIENT (OUTPATIENT)
Dept: OUTPATIENT SERVICES | Facility: HOSPITAL | Age: 47
LOS: 1 days | End: 2022-04-12
Payer: COMMERCIAL

## 2022-04-12 DIAGNOSIS — Z00.8 ENCOUNTER FOR OTHER GENERAL EXAMINATION: ICD-10-CM

## 2022-04-12 DIAGNOSIS — Z90.49 ACQUIRED ABSENCE OF OTHER SPECIFIED PARTS OF DIGESTIVE TRACT: Chronic | ICD-10-CM

## 2022-04-12 DIAGNOSIS — B19.10 UNSPECIFIED VIRAL HEPATITIS B WITHOUT HEPATIC COMA: ICD-10-CM

## 2022-04-12 DIAGNOSIS — K76.0 FATTY (CHANGE OF) LIVER, NOT ELSEWHERE CLASSIFIED: ICD-10-CM

## 2022-04-12 PROCEDURE — 76700 US EXAM ABDOM COMPLETE: CPT

## 2022-04-12 PROCEDURE — 76700 US EXAM ABDOM COMPLETE: CPT | Mod: 26

## 2022-04-13 ENCOUNTER — LABORATORY RESULT (OUTPATIENT)
Age: 47
End: 2022-04-13

## 2022-04-14 LAB
AFP-TM SERPL-MCNC: 2.8 NG/ML
ALBUMIN SERPL ELPH-MCNC: 4.8 G/DL
ALP BLD-CCNC: 82 U/L
ALT SERPL-CCNC: 32 U/L
ANION GAP SERPL CALC-SCNC: 16 MMOL/L
AST SERPL-CCNC: 22 U/L
BASOPHILS # BLD AUTO: 0.05 K/UL
BASOPHILS NFR BLD AUTO: 0.8 %
BILIRUB SERPL-MCNC: 0.9 MG/DL
BUN SERPL-MCNC: 21 MG/DL
CALCIUM SERPL-MCNC: 9.3 MG/DL
CHLORIDE SERPL-SCNC: 103 MMOL/L
CO2 SERPL-SCNC: 21 MMOL/L
CREAT SERPL-MCNC: 0.8 MG/DL
EGFR: 109 ML/MIN/1.73M2
EOSINOPHIL # BLD AUTO: 0.17 K/UL
EOSINOPHIL NFR BLD AUTO: 2.6 %
GLUCOSE SERPL-MCNC: 107 MG/DL
HCT VFR BLD CALC: 41.9 %
HGB BLD-MCNC: 13.5 G/DL
IMM GRANULOCYTES NFR BLD AUTO: 0.3 %
LYMPHOCYTES # BLD AUTO: 2.5 K/UL
LYMPHOCYTES NFR BLD AUTO: 38.1 %
MAN DIFF?: NORMAL
MCHC RBC-ENTMCNC: 28.7 PG
MCHC RBC-ENTMCNC: 32.2 GM/DL
MCV RBC AUTO: 89 FL
MONOCYTES # BLD AUTO: 0.49 K/UL
MONOCYTES NFR BLD AUTO: 7.5 %
NEUTROPHILS # BLD AUTO: 3.34 K/UL
NEUTROPHILS NFR BLD AUTO: 50.7 %
PLATELET # BLD AUTO: 272 K/UL
POTASSIUM SERPL-SCNC: 4.5 MMOL/L
PROT SERPL-MCNC: 7 G/DL
RBC # BLD: 4.71 M/UL
RBC # FLD: 13.4 %
SODIUM SERPL-SCNC: 140 MMOL/L
WBC # FLD AUTO: 6.57 K/UL

## 2022-04-18 ENCOUNTER — APPOINTMENT (OUTPATIENT)
Dept: HEPATOLOGY | Facility: CLINIC | Age: 47
End: 2022-04-18
Payer: COMMERCIAL

## 2022-04-18 VITALS
DIASTOLIC BLOOD PRESSURE: 91 MMHG | BODY MASS INDEX: 29.32 KG/M2 | HEART RATE: 83 BPM | OXYGEN SATURATION: 98 % | WEIGHT: 176 LBS | TEMPERATURE: 97.1 F | RESPIRATION RATE: 16 BRPM | SYSTOLIC BLOOD PRESSURE: 137 MMHG | HEIGHT: 65 IN

## 2022-04-18 PROCEDURE — 99214 OFFICE O/P EST MOD 30 MIN: CPT

## 2022-04-18 NOTE — ASSESSMENT
[FreeTextEntry1] : Mr. KIM is a 46 year old man with chronic hepatitis B, HTN, HLD, C. difficile diarrhea 8/2020, colonic adeno-carcinoma s/p R hemicolectomy 8/12/20.\par \par \par - hepatitis B, on entecavir since 2008, ([facility] until 2016), with negative HBV PCR. HBsAg and HBcAb positive. HBsAb(-)\par - HCC screening: [place] 10/01/21 negative\par - overweight, BMI 26.5 after 20 lbs weight loss since surgery - was likely beneficial for his fatty liver\par - AST/ALT normal in 12/2020 - ALT was mildly elev. in 8/2020. ALP elevated 169 U/L was normal in 8/2020\par - normocytic anemia, Hb 11.5, MCV 84\par - NAFLD, may have SHEA - fatty liver seen on CT 8/02/20\par    - in past elevated ALP and/or AST/ALT\par    - fibroscan 1/2021: mild steatosis (S1 of 3), no fibrosis\par \par - naive to hepatitis A and C. Can have non-live vaccines\par - wife was vaccinated against hepatitis B. Sister is 50 and needs colon cancer screening - discussed\par \par - colon cancer, stage 3/4 s/p hemicolectomy 7/2020, completed neoadjuvant chemotherapy. Colonoscopy 7/2021: excellent prep, unremarkable anastomosis, no polyps. Biopsies: mild inflammation at anastomosis. \par \par Plan:\par - hepatitis B: continue entecavir, refilled at specialty pharmacy\par - HCC screening and repeat labs every 6 months, in 10/2021, return after that\par - colonoscopy after 3-5 yrs\par - should loose some weight, by calorie-restricted diet and exercise\par

## 2022-04-18 NOTE — HISTORY OF PRESENT ILLNESS
[de-identified] : - 4/18/22: returns after bloodwork - LFTs normal; and US abdomen - steatosis. Also had CT 2/2022 by Dr. Dorado - no cancer recurrence.\par \par - 10/011/21: returns after bloodwork and US abdomen. Doing well.\par \par - 4/16/21: doing well, returns after labs and US abdomen.\par \par - 1/4/20: returns after fibroscan, US and CT abdomen. Reports slight tingling on fingers, otherwise feels normal.\par \par - Mr. KIM is a 45 year old man with chronic hepatitis B, on entecavir since about 2008, HTN, HLD, recent hospitalization 8/-2-11/2020 with C. difficile diarrhea, found to have a tubulovillous adenoma, s/p R hemicolectomy 8/12/20 adeno-carcinoma.\par He saw a hepatologist at [facility] in the past until 4 yrs ago.\par Weight hx: 164 lbs, BMI 26.5 after 12 lb weight loss since hemicolectomy. Used to weigh 177 lbs prior to that for many years.\par Alcohol hx: does not drink, never drank heavily. Shx: [place] 8 from [facility]\par \par Workup:\par - 1/4/21 fibroscan: 4.3 kPa, 260 dB/m - F0, S1 - no fibrosis, mild steatosis\par - 8/2/20 CT abdomen: fatty liver, Circumferential mural thickening of the ascending colon with adjacent enlarged mesenteric lymph nodes. Primary consideration is neoplasm. Colitis is considered less likely.

## 2022-09-16 ENCOUNTER — OUTPATIENT (OUTPATIENT)
Dept: OUTPATIENT SERVICES | Facility: HOSPITAL | Age: 47
LOS: 1 days | Discharge: HOME | End: 2022-09-16

## 2022-09-16 DIAGNOSIS — C18.6 MALIGNANT NEOPLASM OF DESCENDING COLON: ICD-10-CM

## 2022-09-16 DIAGNOSIS — Z90.49 ACQUIRED ABSENCE OF OTHER SPECIFIED PARTS OF DIGESTIVE TRACT: Chronic | ICD-10-CM

## 2022-09-19 ENCOUNTER — RESULT REVIEW (OUTPATIENT)
Age: 47
End: 2022-09-19

## 2022-09-19 ENCOUNTER — APPOINTMENT (OUTPATIENT)
Dept: HEMATOLOGY ONCOLOGY | Facility: CLINIC | Age: 47
End: 2022-09-19

## 2022-09-19 VITALS
HEART RATE: 64 BPM | RESPIRATION RATE: 16 BRPM | HEIGHT: 65 IN | DIASTOLIC BLOOD PRESSURE: 89 MMHG | BODY MASS INDEX: 30.3 KG/M2 | WEIGHT: 181.88 LBS | OXYGEN SATURATION: 99 % | TEMPERATURE: 97.2 F | SYSTOLIC BLOOD PRESSURE: 126 MMHG

## 2022-09-19 LAB
ALBUMIN SERPL ELPH-MCNC: 4.7 G/DL
ALP BLD-CCNC: 82 U/L
ALT SERPL-CCNC: 40 U/L
ANION GAP SERPL CALC-SCNC: 14 MMOL/L
AST SERPL-CCNC: 27 U/L
BASOPHILS # BLD AUTO: 0.04 K/UL — SIGNIFICANT CHANGE UP (ref 0–0.2)
BASOPHILS NFR BLD AUTO: 0.7 % — SIGNIFICANT CHANGE UP (ref 0–2)
BILIRUB SERPL-MCNC: 0.9 MG/DL
BUN SERPL-MCNC: 14 MG/DL
CALCIUM SERPL-MCNC: 9.5 MG/DL
CEA SERPL-MCNC: 2 NG/ML
CHLORIDE SERPL-SCNC: 105 MMOL/L
CO2 SERPL-SCNC: 22 MMOL/L
CREAT SERPL-MCNC: 0.88 MG/DL
EGFR: 105 ML/MIN/1.73M2
EOSINOPHIL # BLD AUTO: 0.09 K/UL — SIGNIFICANT CHANGE UP (ref 0–0.5)
EOSINOPHIL NFR BLD AUTO: 1.6 % — SIGNIFICANT CHANGE UP (ref 0–6)
GLUCOSE SERPL-MCNC: 116 MG/DL
HCT VFR BLD CALC: 40.8 % — SIGNIFICANT CHANGE UP (ref 39–50)
HGB BLD-MCNC: 13.7 G/DL — SIGNIFICANT CHANGE UP (ref 13–17)
IMM GRANULOCYTES NFR BLD AUTO: 0.2 % — SIGNIFICANT CHANGE UP (ref 0–0.9)
LYMPHOCYTES # BLD AUTO: 2.04 K/UL — SIGNIFICANT CHANGE UP (ref 1–3.3)
LYMPHOCYTES # BLD AUTO: 36 % — SIGNIFICANT CHANGE UP (ref 13–44)
MCHC RBC-ENTMCNC: 28.9 PG — SIGNIFICANT CHANGE UP (ref 27–34)
MCHC RBC-ENTMCNC: 33.6 G/DL — SIGNIFICANT CHANGE UP (ref 32–36)
MCV RBC AUTO: 86.1 FL — SIGNIFICANT CHANGE UP (ref 80–100)
MONOCYTES # BLD AUTO: 0.38 K/UL — SIGNIFICANT CHANGE UP (ref 0–0.9)
MONOCYTES NFR BLD AUTO: 6.7 % — SIGNIFICANT CHANGE UP (ref 2–14)
NEUTROPHILS # BLD AUTO: 3.11 K/UL — SIGNIFICANT CHANGE UP (ref 1.8–7.4)
NEUTROPHILS NFR BLD AUTO: 54.8 % — SIGNIFICANT CHANGE UP (ref 43–77)
NRBC # BLD: 0 /100 WBCS — SIGNIFICANT CHANGE UP (ref 0–0)
PLATELET # BLD AUTO: 245 K/UL — SIGNIFICANT CHANGE UP (ref 150–400)
POTASSIUM SERPL-SCNC: 5 MMOL/L
PROT SERPL-MCNC: 7.3 G/DL
RBC # BLD: 4.74 M/UL — SIGNIFICANT CHANGE UP (ref 4.2–5.8)
RBC # FLD: 12.8 % — SIGNIFICANT CHANGE UP (ref 10.3–14.5)
SODIUM SERPL-SCNC: 140 MMOL/L
WBC # BLD: 5.67 K/UL — SIGNIFICANT CHANGE UP (ref 3.8–10.5)
WBC # FLD AUTO: 5.67 K/UL — SIGNIFICANT CHANGE UP (ref 3.8–10.5)

## 2022-09-19 PROCEDURE — 99214 OFFICE O/P EST MOD 30 MIN: CPT

## 2022-09-19 RX ORDER — AMLODIPINE BESYLATE 5 MG/1
TABLET ORAL
Refills: 0 | Status: COMPLETED | COMMUNITY
End: 2022-09-19

## 2022-09-19 RX ORDER — AMLODIPINE AND VALSARTAN 5; 160 MG/1; MG/1
5-160 TABLET, FILM COATED ORAL
Qty: 90 | Refills: 0 | Status: ACTIVE | COMMUNITY
Start: 2022-06-29

## 2022-09-19 RX ORDER — VALSARTAN 40 MG/1
TABLET ORAL
Refills: 0 | Status: COMPLETED | COMMUNITY
End: 2022-09-19

## 2022-09-19 NOTE — HISTORY OF PRESENT ILLNESS
[Disease: _____________________] : Disease: [unfilled] [T: ___] : T[unfilled] [N: ___] : N[unfilled] [M: ___] : M[unfilled] [AJCC Stage: ____] : AJCC Stage: [unfilled] [de-identified] : 47 M with h/o chronic hepatitis B on entecavir presents for further management of resected high risk Stage III (T3/4N2), DAVIE R colon cancer, \par \par Bobby was admitted to [facility] on 8/2/20 with acute onset abdominal pain and fever. CT A/P showed circumferential mural thickening of the ascending colon with adjacent enlarged mesenteric LN. Primary consideration was a neoplasm. A colonoscopy on 8/4/20 showed an obstructing mass in the ascending colon. 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 On Aug 7, 2020, underwent a hemicolectomy, excised portion of small bowel. BCx from admission showed Clostridum septicum but repeat cultures were negative. Completed 2 weeks of antibiotics. Post op course otherwise was unremarkable. \par \par Referred to medical oncology for adjuvant therapy. \par \par 8/24/20: CT Chest: 4 mm LL nodule, 3 mos scan f/u to determine stability \par 9/2-2/3/21:C1-12 FOLFOX (omitted Oxaliplatin C12)\par 11/11/20 C5 (cytokine storm during Oxali infusion characterized by hypotension resolved with IVF)\par 11/14/20: CT CAP: pulm nodule unchanged, no mets\par 11/25/20: C6 (to run NS @100 ml/hr along with Oxali over 4 hours)\par 2/27/21: CT CAP: AMANDEEP\par 7/31/21: [place]: AMANDEEP \par 2/18/22: CT CAP: AMANDEEP [de-identified] : moderately invasive adenocarcinoma  [FreeTextEntry1] : surveillance  [de-identified] : Patient presented to the office for his routine surveillance visit. He currently feels fine and reported no acute complaints. He admitted to adequate appetite, stable weight and regular bowel movements (normal in color, solid consistency, frequency). His peripheral neuropathy remains unchanged (numbness at distal end of fingers and toes); able to complete all self-care ADLs independently without restrictions.

## 2022-10-22 ENCOUNTER — APPOINTMENT (OUTPATIENT)
Dept: ULTRASOUND IMAGING | Facility: CLINIC | Age: 47
End: 2022-10-22

## 2022-10-22 ENCOUNTER — OUTPATIENT (OUTPATIENT)
Dept: OUTPATIENT SERVICES | Facility: HOSPITAL | Age: 47
LOS: 1 days | End: 2022-10-22
Payer: COMMERCIAL

## 2022-10-22 DIAGNOSIS — B19.10 UNSPECIFIED VIRAL HEPATITIS B WITHOUT HEPATIC COMA: ICD-10-CM

## 2022-10-22 DIAGNOSIS — Z90.49 ACQUIRED ABSENCE OF OTHER SPECIFIED PARTS OF DIGESTIVE TRACT: Chronic | ICD-10-CM

## 2022-10-22 DIAGNOSIS — Z00.8 ENCOUNTER FOR OTHER GENERAL EXAMINATION: ICD-10-CM

## 2022-10-22 PROCEDURE — 76700 US EXAM ABDOM COMPLETE: CPT

## 2022-10-22 PROCEDURE — 76700 US EXAM ABDOM COMPLETE: CPT | Mod: 26

## 2022-10-24 LAB
AFP-TM SERPL-MCNC: 3.7 NG/ML
ALBUMIN SERPL ELPH-MCNC: 5.1 G/DL
ALP BLD-CCNC: 82 U/L
ALT SERPL-CCNC: 32 U/L
ANION GAP SERPL CALC-SCNC: 15 MMOL/L
AST SERPL-CCNC: 20 U/L
BASOPHILS # BLD AUTO: 0.03 K/UL
BASOPHILS NFR BLD AUTO: 0.5 %
BILIRUB SERPL-MCNC: 0.9 MG/DL
BUN SERPL-MCNC: 19 MG/DL
CALCIUM SERPL-MCNC: 10.3 MG/DL
CHLORIDE SERPL-SCNC: 105 MMOL/L
CO2 SERPL-SCNC: 21 MMOL/L
CREAT SERPL-MCNC: 0.85 MG/DL
EGFR: 107 ML/MIN/1.73M2
EOSINOPHIL # BLD AUTO: 0.11 K/UL
EOSINOPHIL NFR BLD AUTO: 1.9 %
GLUCOSE SERPL-MCNC: 115 MG/DL
HCT VFR BLD CALC: 42.4 %
HEPB DNA PCR INT: NOT DETECTED
HEPB DNA PCR LOG: NOT DETECTED LOGIU/ML
HGB BLD-MCNC: 13.9 G/DL
IMM GRANULOCYTES NFR BLD AUTO: 0.3 %
LYMPHOCYTES # BLD AUTO: 1.71 K/UL
LYMPHOCYTES NFR BLD AUTO: 29.6 %
MAN DIFF?: NORMAL
MCHC RBC-ENTMCNC: 29.1 PG
MCHC RBC-ENTMCNC: 32.8 GM/DL
MCV RBC AUTO: 88.7 FL
MONOCYTES # BLD AUTO: 0.41 K/UL
MONOCYTES NFR BLD AUTO: 7.1 %
NEUTROPHILS # BLD AUTO: 3.5 K/UL
NEUTROPHILS NFR BLD AUTO: 60.6 %
PLATELET # BLD AUTO: 293 K/UL
POTASSIUM SERPL-SCNC: 5.4 MMOL/L
PROT SERPL-MCNC: 7.8 G/DL
RBC # BLD: 4.78 M/UL
RBC # FLD: 13.3 %
SODIUM SERPL-SCNC: 141 MMOL/L
WBC # FLD AUTO: 5.78 K/UL

## 2022-11-14 ENCOUNTER — APPOINTMENT (OUTPATIENT)
Dept: HEPATOLOGY | Facility: CLINIC | Age: 47
End: 2022-11-14

## 2022-11-14 VITALS
RESPIRATION RATE: 16 BRPM | HEART RATE: 81 BPM | TEMPERATURE: 97.8 F | DIASTOLIC BLOOD PRESSURE: 88 MMHG | BODY MASS INDEX: 29.41 KG/M2 | HEIGHT: 66 IN | SYSTOLIC BLOOD PRESSURE: 148 MMHG | WEIGHT: 183 LBS | OXYGEN SATURATION: 97 %

## 2022-11-14 PROCEDURE — 99213 OFFICE O/P EST LOW 20 MIN: CPT

## 2022-11-14 RX ORDER — SODIUM PICOSULFATE, MAGNESIUM OXIDE, AND ANHYDROUS CITRIC ACID 10; 3.5; 12 MG/160ML; G/160ML; G/160ML
10-3.5-12 MG-GM LIQUID ORAL
Qty: 1 | Refills: 0 | Status: DISCONTINUED | COMMUNITY
Start: 2021-06-30 | End: 2022-11-14

## 2022-11-14 NOTE — ASSESSMENT
[FreeTextEntry1] : Mr. KIM is a 47 year old man with chronic hepatitis B, HTN, HLD, C. difficile diarrhea 8/2020, colonic adeno-carcinoma s/p R hemicolectomy 8/12/20, and\par \par \par - hepatitis B, on entecavir since 2008, ([facility] until 2016), with negative HBV PCR. HBsAg and HBcAb positive. HBsAb(-)\par - HCC screening: [place] 10/2022 negative\par - overweight, BMI 26.5 after 20 lbs weight loss since hemicolectomy - was likely beneficial for his fatty liver\par - AST/ALT normal in 12/2020 - ALT was mildly elev. in 8/2020. ALP elevated 169 U/L was normal in 8/2020\par \par - NAFLD, may have SHEA - fatty liver seen on CT 8/02/20\par    - in past elevated ALP and/or AST/ALT\par    - fibroscan 1/2021: mild steatosis (S1 of 3), no fibrosis\par \par - naive to hepatitis A and C. Can have non-live vaccines\par - wife was vaccinated against hepatitis B. Sister is 50 and needs colon cancer screening - discussed\par \par - colon cancer, stage 3/4 s/p hemicolectomy 7/2020, completed neoadjuvant chemotherapy. Colonoscopy 7/2021: excellent prep, unremarkable anastomosis, no polyps. Biopsies: mild inflammation at anastomosis. Repeat after 3 and then 5 years recommended by the [place] [facility].\par \par Plan:\par - hepatitis B: continue entecavir, refilled at specialty pharmacy\par - HCC screening and repeat labs every 6 months, in 10/2021, return after that\par - colonoscopy in 2024\par - should loose some weight, by calorie-restricted diet and exercise\par

## 2022-11-14 NOTE — HISTORY OF PRESENT ILLNESS
[de-identified] : - 11/14/22: had BW and US. Gained 7 lbs. Doing well.\par \par - 4/18/22: returns after bloodwork - LFTs normal; and US abdomen - steatosis. Also had CT 2/2022 by Dr. Dorado - no cancer recurrence.\par \par - 10/011/21: returns after bloodwork and US abdomen. Doing well.\par \par - 4/16/21: doing well, returns after labs and US abdomen.\par \par - 1/4/20: returns after fibroscan, US and CT abdomen. Reports slight tingling on fingers, otherwise feels normal.\par \par - Mr. KIM is a 45 year old man with chronic hepatitis B, on entecavir since about 2008, HTN, HLD, recent hospitalization 8/-2-11/2020 with C. difficile diarrhea, found to have a tubulovillous adenoma, s/p R hemicolectomy 8/12/20 adeno-carcinoma.\par He saw a hepatologist at [facility] in the past until 4 yrs ago.\par Weight hx: 164 lbs, BMI 26.5 after 12 lb weight loss since hemicolectomy. Used to weigh 177 lbs prior to that for many years.\par Alcohol hx: does not drink, never drank heavily. Shx: [place] 8 from [facility]\par \par Workup:\par - 10/24/22 US abdomen: steatosis, no suspicious hepatic lesion.\par - 1/4/21 fibroscan: 4.3 kPa, 260 dB/m - F0, S1 - no fibrosis, mild steatosis\par - 8/2/20 CT abdomen: fatty liver, Circumferential mural thickening of the ascending colon with adjacent enlarged mesenteric lymph nodes. Primary consideration is neoplasm. Colitis is considered less likely.

## 2023-02-06 ENCOUNTER — RESULT REVIEW (OUTPATIENT)
Age: 48
End: 2023-02-06

## 2023-02-18 ENCOUNTER — APPOINTMENT (OUTPATIENT)
Dept: CT IMAGING | Facility: CLINIC | Age: 48
End: 2023-02-18
Payer: COMMERCIAL

## 2023-02-18 ENCOUNTER — OUTPATIENT (OUTPATIENT)
Dept: OUTPATIENT SERVICES | Facility: HOSPITAL | Age: 48
LOS: 1 days | End: 2023-02-18
Payer: COMMERCIAL

## 2023-02-18 DIAGNOSIS — C18.9 MALIGNANT NEOPLASM OF COLON, UNSPECIFIED: ICD-10-CM

## 2023-02-18 DIAGNOSIS — Z00.8 ENCOUNTER FOR OTHER GENERAL EXAMINATION: ICD-10-CM

## 2023-02-18 DIAGNOSIS — Z90.49 ACQUIRED ABSENCE OF OTHER SPECIFIED PARTS OF DIGESTIVE TRACT: Chronic | ICD-10-CM

## 2023-02-18 PROCEDURE — 71260 CT THORAX DX C+: CPT

## 2023-02-18 PROCEDURE — 71260 CT THORAX DX C+: CPT | Mod: 26

## 2023-02-18 PROCEDURE — 74177 CT ABD & PELVIS W/CONTRAST: CPT | Mod: 26

## 2023-02-18 PROCEDURE — 74177 CT ABD & PELVIS W/CONTRAST: CPT

## 2023-03-15 ENCOUNTER — OUTPATIENT (OUTPATIENT)
Dept: OUTPATIENT SERVICES | Facility: HOSPITAL | Age: 48
LOS: 1 days | Discharge: HOME | End: 2023-03-15

## 2023-03-15 DIAGNOSIS — C18.9 MALIGNANT NEOPLASM OF COLON, UNSPECIFIED: ICD-10-CM

## 2023-03-15 DIAGNOSIS — Z90.49 ACQUIRED ABSENCE OF OTHER SPECIFIED PARTS OF DIGESTIVE TRACT: Chronic | ICD-10-CM

## 2023-03-20 ENCOUNTER — RESULT REVIEW (OUTPATIENT)
Age: 48
End: 2023-03-20

## 2023-03-20 ENCOUNTER — APPOINTMENT (OUTPATIENT)
Dept: HEMATOLOGY ONCOLOGY | Facility: CLINIC | Age: 48
End: 2023-03-20
Payer: COMMERCIAL

## 2023-03-20 VITALS
WEIGHT: 181.88 LBS | BODY MASS INDEX: 29.36 KG/M2 | OXYGEN SATURATION: 99 % | DIASTOLIC BLOOD PRESSURE: 88 MMHG | SYSTOLIC BLOOD PRESSURE: 135 MMHG | RESPIRATION RATE: 15 BRPM | TEMPERATURE: 97.3 F | HEART RATE: 74 BPM

## 2023-03-20 LAB
BASOPHILS # BLD AUTO: 0.04 K/UL — SIGNIFICANT CHANGE UP (ref 0–0.2)
BASOPHILS NFR BLD AUTO: 0.7 % — SIGNIFICANT CHANGE UP (ref 0–2)
EOSINOPHIL # BLD AUTO: 0.11 K/UL — SIGNIFICANT CHANGE UP (ref 0–0.5)
EOSINOPHIL NFR BLD AUTO: 2 % — SIGNIFICANT CHANGE UP (ref 0–6)
HCT VFR BLD CALC: 43 % — SIGNIFICANT CHANGE UP (ref 39–50)
HGB BLD-MCNC: 14.2 G/DL — SIGNIFICANT CHANGE UP (ref 13–17)
IMM GRANULOCYTES NFR BLD AUTO: 0.4 % — SIGNIFICANT CHANGE UP (ref 0–0.9)
LYMPHOCYTES # BLD AUTO: 1.57 K/UL — SIGNIFICANT CHANGE UP (ref 1–3.3)
LYMPHOCYTES # BLD AUTO: 28.8 % — SIGNIFICANT CHANGE UP (ref 13–44)
MCHC RBC-ENTMCNC: 28.8 PG — SIGNIFICANT CHANGE UP (ref 27–34)
MCHC RBC-ENTMCNC: 33 G/DL — SIGNIFICANT CHANGE UP (ref 32–36)
MCV RBC AUTO: 87.2 FL — SIGNIFICANT CHANGE UP (ref 80–100)
MONOCYTES # BLD AUTO: 0.35 K/UL — SIGNIFICANT CHANGE UP (ref 0–0.9)
MONOCYTES NFR BLD AUTO: 6.4 % — SIGNIFICANT CHANGE UP (ref 2–14)
NEUTROPHILS # BLD AUTO: 3.36 K/UL — SIGNIFICANT CHANGE UP (ref 1.8–7.4)
NEUTROPHILS NFR BLD AUTO: 61.7 % — SIGNIFICANT CHANGE UP (ref 43–77)
NRBC # BLD: 0 /100 WBCS — SIGNIFICANT CHANGE UP (ref 0–0)
PLATELET # BLD AUTO: 255 K/UL — SIGNIFICANT CHANGE UP (ref 150–400)
RBC # BLD: 4.93 M/UL — SIGNIFICANT CHANGE UP (ref 4.2–5.8)
RBC # FLD: 12.9 % — SIGNIFICANT CHANGE UP (ref 10.3–14.5)
WBC # BLD: 5.45 K/UL — SIGNIFICANT CHANGE UP (ref 3.8–10.5)
WBC # FLD AUTO: 5.45 K/UL — SIGNIFICANT CHANGE UP (ref 3.8–10.5)

## 2023-03-20 PROCEDURE — 99213 OFFICE O/P EST LOW 20 MIN: CPT

## 2023-03-20 NOTE — HISTORY OF PRESENT ILLNESS
[Disease: _____________________] : Disease: [unfilled] [T: ___] : T[unfilled] [N: ___] : N[unfilled] [M: ___] : M[unfilled] [AJCC Stage: ____] : AJCC Stage: [unfilled] [de-identified] : 45 M with h/o chronic hepatitis B on entecavir presents for further management of resected high risk Stage III (T3/4N2), DAVIE R colon cancer, \par \par Bobby was admitted to [facility] on 8/2/20 with acute onset abdominal pain and fever. CT A/P showed circumferential mural thickening of the ascending colon with adjacent enlarged mesenteric LN. Primary consideration was a neoplasm. A colonoscopy on 8/4/20 showed an obstructing mass in the ascending colon. 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 On Aug 7, 2020, underwent a hemicolectomy, excised portion of small bowel. BCx from admission showed Clostridum septicum but repeat cultures were negative. Completed 2 weeks of antibiotics. Post op course otherwise was unremarkable. \par \par Referred to medical oncology for adjuvant therapy. \par \par 8/24/20: CT Chest: 4 mm LL nodule, 3 mos scan f/u to determine stability \par 9/2-2/3/21:C1-12 FOLFOX (omitted Oxaliplatin C12)\par 11/11/20 C5 (cytokine storm during Oxali infusion characterized by hypotension resolved with IVF)\par 11/14/20: CT CAP: pulm nodule unchanged, no mets\par 11/25/20: C6 (to run NS @100 ml/hr along with Oxali over 4 hours)\par 2/27/21: CT CAP: AMANDEEP\par 7/31/21: [place]: AMANDEEP \par 2/18/22: CT CAP: AMANDEEP\par 2/24/23: CT CAP: AMANDEEP  [de-identified] : moderately invasive adenocarcinoma  [FreeTextEntry1] : surveillance  [de-identified] : overall feels well. denies any c/o.

## 2023-03-21 LAB — CEA SERPL-MCNC: 2 NG/ML

## 2023-05-01 ENCOUNTER — OUTPATIENT (OUTPATIENT)
Dept: OUTPATIENT SERVICES | Facility: HOSPITAL | Age: 48
LOS: 1 days | End: 2023-05-01
Payer: COMMERCIAL

## 2023-05-01 ENCOUNTER — APPOINTMENT (OUTPATIENT)
Dept: ULTRASOUND IMAGING | Facility: CLINIC | Age: 48
End: 2023-05-01
Payer: COMMERCIAL

## 2023-05-01 DIAGNOSIS — B19.10 UNSPECIFIED VIRAL HEPATITIS B WITHOUT HEPATIC COMA: ICD-10-CM

## 2023-05-01 DIAGNOSIS — Z00.8 ENCOUNTER FOR OTHER GENERAL EXAMINATION: ICD-10-CM

## 2023-05-01 DIAGNOSIS — Z90.49 ACQUIRED ABSENCE OF OTHER SPECIFIED PARTS OF DIGESTIVE TRACT: Chronic | ICD-10-CM

## 2023-05-01 PROCEDURE — 76700 US EXAM ABDOM COMPLETE: CPT

## 2023-05-01 PROCEDURE — 76700 US EXAM ABDOM COMPLETE: CPT | Mod: 26

## 2023-05-02 LAB
AFP-TM SERPL-MCNC: 3.2 NG/ML
ALBUMIN SERPL ELPH-MCNC: 5 G/DL
ALP BLD-CCNC: 75 U/L
ALT SERPL-CCNC: 35 U/L
ANION GAP SERPL CALC-SCNC: 13 MMOL/L
AST SERPL-CCNC: 30 U/L
BASOPHILS # BLD AUTO: 0.06 K/UL
BASOPHILS NFR BLD AUTO: 0.8 %
BILIRUB SERPL-MCNC: 1 MG/DL
BUN SERPL-MCNC: 10 MG/DL
CALCIUM SERPL-MCNC: 9.6 MG/DL
CHLORIDE SERPL-SCNC: 104 MMOL/L
CO2 SERPL-SCNC: 23 MMOL/L
CREAT SERPL-MCNC: 0.77 MG/DL
EGFR: 110 ML/MIN/1.73M2
EOSINOPHIL # BLD AUTO: 0.13 K/UL
EOSINOPHIL NFR BLD AUTO: 1.8 %
GLUCOSE SERPL-MCNC: 98 MG/DL
HCT VFR BLD CALC: 42.4 %
HGB BLD-MCNC: 14 G/DL
IMM GRANULOCYTES NFR BLD AUTO: 0.3 %
LYMPHOCYTES # BLD AUTO: 2.26 K/UL
LYMPHOCYTES NFR BLD AUTO: 31.7 %
MAN DIFF?: NORMAL
MCHC RBC-ENTMCNC: 29.3 PG
MCHC RBC-ENTMCNC: 33 GM/DL
MCV RBC AUTO: 88.7 FL
MONOCYTES # BLD AUTO: 0.41 K/UL
MONOCYTES NFR BLD AUTO: 5.8 %
NEUTROPHILS # BLD AUTO: 4.25 K/UL
NEUTROPHILS NFR BLD AUTO: 59.6 %
PLATELET # BLD AUTO: 288 K/UL
POTASSIUM SERPL-SCNC: 4.4 MMOL/L
PROT SERPL-MCNC: 7.6 G/DL
RBC # BLD: 4.78 M/UL
RBC # FLD: 13.2 %
SODIUM SERPL-SCNC: 140 MMOL/L
WBC # FLD AUTO: 7.13 K/UL

## 2023-05-03 LAB
HEPB DNA PCR INT: NOT DETECTED
HEPB DNA PCR LOG: NOT DETECTED LOGIU/ML

## 2023-05-08 ENCOUNTER — APPOINTMENT (OUTPATIENT)
Dept: HEPATOLOGY | Facility: CLINIC | Age: 48
End: 2023-05-08

## 2023-05-30 ENCOUNTER — OUTPATIENT (OUTPATIENT)
Dept: OUTPATIENT SERVICES | Facility: HOSPITAL | Age: 48
LOS: 1 days | Discharge: HOME | End: 2023-05-30

## 2023-05-30 DIAGNOSIS — C18.9 MALIGNANT NEOPLASM OF COLON, UNSPECIFIED: ICD-10-CM

## 2023-05-30 DIAGNOSIS — Z90.49 ACQUIRED ABSENCE OF OTHER SPECIFIED PARTS OF DIGESTIVE TRACT: Chronic | ICD-10-CM

## 2023-06-02 ENCOUNTER — APPOINTMENT (OUTPATIENT)
Dept: HEPATOLOGY | Facility: CLINIC | Age: 48
End: 2023-06-02
Payer: COMMERCIAL

## 2023-06-02 VITALS
HEIGHT: 66 IN | BODY MASS INDEX: 28.77 KG/M2 | OXYGEN SATURATION: 99 % | WEIGHT: 179 LBS | DIASTOLIC BLOOD PRESSURE: 82 MMHG | RESPIRATION RATE: 15 BRPM | TEMPERATURE: 97.5 F | HEART RATE: 69 BPM | SYSTOLIC BLOOD PRESSURE: 122 MMHG

## 2023-06-02 DIAGNOSIS — I25.10 ATHEROSCLEROTIC HEART DISEASE OF NATIVE CORONARY ARTERY W/OUT ANGINA PECTORIS: ICD-10-CM

## 2023-06-02 PROCEDURE — 99214 OFFICE O/P EST MOD 30 MIN: CPT

## 2023-06-02 NOTE — ASSESSMENT
[FreeTextEntry1] : Mr. KIM is a 47 year old man with chronic hepatitis B, HTN, HLD, C. difficile diarrhea 8/2020, colonic adeno-carcinoma s/p R hemicolectomy 8/12/20.\par \par - hepatitis B: eAg-, eAb, on entecavir since 2008, ([facility] until 2016), HBV PCR negative. \par - HCC screening: [place] 5/01/2023 negative\par - overweight, BMI 26.5 after 20 lbs weight loss since hemicolectomy - was likely beneficial for his fatty liver\par - AST/ALT normal in 12/2020 - ALT was mildly elev. in 8/2020. ALP elevated 169 U/L was normal in 8/2020\par \par - NAFLD, may have SHEA - fatty liver seen on CT 8/02/20\par    - in past elevated ALP and/or AST/ALT\par    - fibroscan 1/2021: mild steatosis (S1 of 3), no fibrosis\par \par - naive to hepatitis A and C. Can have non-live vaccines\par - wife was vaccinated against hepatitis B. Sister is 50 and needs colon cancer screening - discussed\par \par - colon cancer, stage 3/4 s/p hemicolectomy 7/2020, completed neoadjuvant chemotherapy. Colonoscopy 7/2021: excellent prep, unremarkable anastomosis, no polyps. Biopsies: mild inflammation at anastomosis. Repeat after 3 and then 5 years recommended by the [place] and most other societies.\par \par Plan:\par - hepatitis B: continue entecavir, refilled at specialty pharmacy\par - HCC screening and repeat labs after 6 months, return after that\par - colonoscopy in 2024\par - should loose some weight, by calorie-restricted diet and exercise\par - coronary artery calcification: refer to cardiology\par

## 2023-06-02 NOTE — HISTORY OF PRESENT ILLNESS
[de-identified] : - 6/2/23: returns after bloodwork and US abdomen. Had f/u CT by Dr. Dorado in February - negative. Asks if colonscoopy should be repeated sooner, ie., this year. Feels good. Plans to travel to [place] for vacation.\par \par - 11/14/22: had BW and US. Gained 7 lbs. Doing well.\par \par - 4/18/22: returns after bloodwork - LFTs normal; and US abdomen - steatosis. Also had CT 2/2022 by Dr. Dorado - no cancer recurrence.\par \par - 10/011/21: returns after bloodwork and US abdomen. Doing well.\par \par - 4/16/21: doing well, returns after labs and US abdomen.\par \par - 1/4/20: returns after fibroscan, US and CT abdomen. Reports slight tingling on fingers, otherwise feels normal.\par \par - Mr. KIM is a 45 year old man with chronic hepatitis B, on entecavir since about 2008, HTN, HLD, recent hospitalization 8/-2-11/2020 with C. difficile diarrhea, found to have a tubulovillous adenoma, s/p R hemicolectomy 8/12/20 adeno-carcinoma.\par He saw a hepatologist at [facility] in the past until 4 yrs ago.\par Weight hx: 164 lbs, BMI 26.5 after 12 lb weight loss since hemicolectomy. Used to weigh 177 lbs prior to that for many years.\par Alcohol hx: does not drink, never drank heavily. Shx: moved to  at age 8 from [facility]\par \par Workup:\par - 2/18/23 CT abdomen/pelvis w PO/IV: right hemicolectomy, anastomosis w/o obstruction. Liver normal. Atherosclerosis with coronary artery calcification. Stable triangular lung nodule, likely intrapulmonary LN, 4 mm. Stable 4 mm LN LLL.\par - 5/2/23 US abdomen: liver normal, no focal lesion. Steatosis.\par - 10/24/22 US abdomen: steatosis, no suspicious hepatic lesion.\par - 1/4/21 fibroscan: 4.3 kPa, 260 dB/m - F0, S1 - no fibrosis, mild steatosis\par - 8/2/20 CT abdomen: fatty liver, Circumferential mural thickening of the ascending colon with adjacent enlarged mesenteric lymph nodes. Primary consideration is neoplasm. Colitis is considered less likely.

## 2023-06-12 ENCOUNTER — APPOINTMENT (OUTPATIENT)
Dept: HEMATOLOGY ONCOLOGY | Facility: CLINIC | Age: 48
End: 2023-06-12

## 2023-07-16 ENCOUNTER — NON-APPOINTMENT (OUTPATIENT)
Age: 48
End: 2023-07-16

## 2023-07-21 ENCOUNTER — APPOINTMENT (OUTPATIENT)
Dept: CARDIOLOGY | Facility: CLINIC | Age: 48
End: 2023-07-21
Payer: COMMERCIAL

## 2023-07-21 ENCOUNTER — NON-APPOINTMENT (OUTPATIENT)
Age: 48
End: 2023-07-21

## 2023-07-21 VITALS
DIASTOLIC BLOOD PRESSURE: 104 MMHG | HEART RATE: 80 BPM | BODY MASS INDEX: 28.77 KG/M2 | OXYGEN SATURATION: 97 % | WEIGHT: 179 LBS | HEIGHT: 66 IN | SYSTOLIC BLOOD PRESSURE: 151 MMHG

## 2023-07-21 PROCEDURE — 99204 OFFICE O/P NEW MOD 45 MIN: CPT | Mod: 25

## 2023-07-21 PROCEDURE — 93000 ELECTROCARDIOGRAM COMPLETE: CPT

## 2023-07-21 NOTE — DISCUSSION/SUMMARY
[FreeTextEntry1] : From a cardiovascular perspective, he does not have any symptoms.  He has no angina, symptoms of heart failure or symptoms of arrhythmia.  His blood pressure is poorly controlled today, though I am told that this is the exception, and not the rule.  He presents with coronary calcification, qualitatively.\par \par His blood pressure remains elevated even by the conclusion of the examination.  I have suggested he start checking his blood pressure twice daily, and that he return to the office for blood pressure check with our nurses.  Hopefully he does not need more medication, but that is a distinct possibility.\par \par I reviewed his CT scan.  This reveals a degree of coronary calcification, but it is not clear how much is present.  I have suggested that we quantify this with a calcium score.  He will arrange to have this performed, and I will be in contact with him to discuss the results.\par \par Pending the results of the calcium score, I would consider some additional testing to assess his burden of atherosclerosis, including an exercise stress test and a carotid ultrasound, and potentially an echocardiogram.

## 2023-07-21 NOTE — HISTORY OF PRESENT ILLNESS
[FreeTextEntry1] : Todd presented to the office today for a cardiovascular evaluation.  He presents for the further evaluation of coronary artery calcification noted on CT scan.\par \par He is now 48 years old, with a history of hypertension.  He has a history of hyperlipidemia.  He does not have a history of diabetes.  He is not known to have any underlying structural heart disease.  He does have a history of chronic hepatitis B, and a history of C. difficile diarrhea.  He has a history of adenocarcinoma of the colon, for which she is status post right hemicolectomy in August 2020.\par \par At baseline, he tries to stay physically active though his activities are generally limited to walking.  With regular physical activities, he feels well, without reproducible chest discomfort or shortness of breath suggestive of angina.  He denies orthopnea, PND and lower extremity edema.  He denies palpitations, dizziness and syncope.\par \par He has been in his usual state of health generally speaking.  He had a CT scan performed by his hepatologist which revealed a degree of coronary artery calcification.  He was advised to see a cardiologist for evaluation.  He is familiar with my office because his wife has see me in the past, and therefore he comes for evaluation.

## 2023-07-28 ENCOUNTER — APPOINTMENT (OUTPATIENT)
Dept: CT IMAGING | Facility: CLINIC | Age: 48
End: 2023-07-28
Payer: COMMERCIAL

## 2023-07-28 ENCOUNTER — OUTPATIENT (OUTPATIENT)
Dept: OUTPATIENT SERVICES | Facility: HOSPITAL | Age: 48
LOS: 1 days | End: 2023-07-28
Payer: COMMERCIAL

## 2023-07-28 DIAGNOSIS — Z00.8 ENCOUNTER FOR OTHER GENERAL EXAMINATION: ICD-10-CM

## 2023-07-28 DIAGNOSIS — I25.10 ATHEROSCLEROTIC HEART DISEASE OF NATIVE CORONARY ARTERY WITHOUT ANGINA PECTORIS: ICD-10-CM

## 2023-07-28 DIAGNOSIS — Z90.49 ACQUIRED ABSENCE OF OTHER SPECIFIED PARTS OF DIGESTIVE TRACT: Chronic | ICD-10-CM

## 2023-07-28 PROCEDURE — 75571 CT HRT W/O DYE W/CA TEST: CPT | Mod: 26

## 2023-07-28 PROCEDURE — 75571 CT HRT W/O DYE W/CA TEST: CPT

## 2023-08-06 RX ORDER — ASPIRIN ENTERIC COATED TABLETS 81 MG 81 MG/1
81 TABLET, DELAYED RELEASE ORAL
Qty: 90 | Refills: 3 | Status: ACTIVE | COMMUNITY
Start: 2023-08-03

## 2023-08-14 ENCOUNTER — APPOINTMENT (OUTPATIENT)
Dept: CARDIOLOGY | Facility: CLINIC | Age: 48
End: 2023-08-14
Payer: COMMERCIAL

## 2023-08-14 PROCEDURE — 93015 CV STRESS TEST SUPVJ I&R: CPT

## 2023-08-14 PROCEDURE — 93880 EXTRACRANIAL BILAT STUDY: CPT

## 2023-08-14 PROCEDURE — 93306 TTE W/DOPPLER COMPLETE: CPT

## 2023-09-08 ENCOUNTER — OUTPATIENT (OUTPATIENT)
Dept: OUTPATIENT SERVICES | Facility: HOSPITAL | Age: 48
LOS: 1 days | Discharge: HOME | End: 2023-09-08

## 2023-09-08 DIAGNOSIS — C18.9 MALIGNANT NEOPLASM OF COLON, UNSPECIFIED: ICD-10-CM

## 2023-09-08 DIAGNOSIS — Z90.49 ACQUIRED ABSENCE OF OTHER SPECIFIED PARTS OF DIGESTIVE TRACT: Chronic | ICD-10-CM

## 2023-09-12 ENCOUNTER — RX RENEWAL (OUTPATIENT)
Age: 48
End: 2023-09-12

## 2023-09-18 ENCOUNTER — APPOINTMENT (OUTPATIENT)
Dept: HEMATOLOGY ONCOLOGY | Facility: CLINIC | Age: 48
End: 2023-09-18
Payer: COMMERCIAL

## 2023-09-18 VITALS
DIASTOLIC BLOOD PRESSURE: 90 MMHG | SYSTOLIC BLOOD PRESSURE: 138 MMHG | RESPIRATION RATE: 15 BRPM | WEIGHT: 171.96 LBS | OXYGEN SATURATION: 99 % | BODY MASS INDEX: 27.76 KG/M2 | TEMPERATURE: 97.3 F | HEART RATE: 71 BPM

## 2023-09-18 PROCEDURE — 99213 OFFICE O/P EST LOW 20 MIN: CPT

## 2023-09-20 LAB — CEA SERPL-MCNC: 1.4 NG/ML

## 2023-11-17 ENCOUNTER — APPOINTMENT (OUTPATIENT)
Dept: CARDIOLOGY | Facility: CLINIC | Age: 48
End: 2023-11-17

## 2023-11-18 ENCOUNTER — OUTPATIENT (OUTPATIENT)
Dept: OUTPATIENT SERVICES | Facility: HOSPITAL | Age: 48
LOS: 1 days | End: 2023-11-18
Payer: COMMERCIAL

## 2023-11-18 ENCOUNTER — APPOINTMENT (OUTPATIENT)
Dept: ULTRASOUND IMAGING | Facility: CLINIC | Age: 48
End: 2023-11-18
Payer: COMMERCIAL

## 2023-11-18 DIAGNOSIS — B19.10 UNSPECIFIED VIRAL HEPATITIS B WITHOUT HEPATIC COMA: ICD-10-CM

## 2023-11-18 DIAGNOSIS — Z90.49 ACQUIRED ABSENCE OF OTHER SPECIFIED PARTS OF DIGESTIVE TRACT: Chronic | ICD-10-CM

## 2023-11-18 PROCEDURE — 76700 US EXAM ABDOM COMPLETE: CPT | Mod: 26

## 2023-11-18 PROCEDURE — 76700 US EXAM ABDOM COMPLETE: CPT

## 2023-11-23 LAB
AFP-TM SERPL-MCNC: 3.7 NG/ML
ALBUMIN SERPL ELPH-MCNC: 5 G/DL
ALP BLD-CCNC: 70 U/L
ALT SERPL-CCNC: 25 U/L
ANION GAP SERPL CALC-SCNC: 12 MMOL/L
AST SERPL-CCNC: 16 U/L
BILIRUB SERPL-MCNC: 0.9 MG/DL
BUN SERPL-MCNC: 13 MG/DL
CALCIUM SERPL-MCNC: 9.1 MG/DL
CHLORIDE SERPL-SCNC: 106 MMOL/L
CO2 SERPL-SCNC: 21 MMOL/L
CREAT SERPL-MCNC: 0.72 MG/DL
EGFR: 111 ML/MIN/1.73M2
GLUCOSE SERPL-MCNC: 91 MG/DL
POTASSIUM SERPL-SCNC: 4.5 MMOL/L
PROT SERPL-MCNC: 7.6 G/DL
SODIUM SERPL-SCNC: 139 MMOL/L

## 2023-11-24 LAB
HBV DNA # SERPL NAA+PROBE: <10 IU/ML
HEPB DNA PCR INT: DETECTED
HEPB DNA PCR LOG: <1 LOGIU/ML

## 2023-12-08 ENCOUNTER — APPOINTMENT (OUTPATIENT)
Dept: HEPATOLOGY | Facility: CLINIC | Age: 48
End: 2023-12-08
Payer: COMMERCIAL

## 2023-12-08 VITALS
WEIGHT: 176 LBS | OXYGEN SATURATION: 100 % | TEMPERATURE: 98.3 F | BODY MASS INDEX: 28.28 KG/M2 | HEART RATE: 89 BPM | DIASTOLIC BLOOD PRESSURE: 91 MMHG | SYSTOLIC BLOOD PRESSURE: 141 MMHG | RESPIRATION RATE: 15 BRPM | HEIGHT: 66 IN

## 2023-12-08 PROCEDURE — 99214 OFFICE O/P EST MOD 30 MIN: CPT

## 2023-12-08 RX ORDER — ENTECAVIR 0.5 MG/1
0.5 TABLET, FILM COATED ORAL
Qty: 90 | Refills: 3 | Status: ACTIVE | COMMUNITY
Start: 2022-02-18 | End: 1900-01-01

## 2024-01-10 ENCOUNTER — RESULT REVIEW (OUTPATIENT)
Age: 49
End: 2024-01-10

## 2024-02-02 ENCOUNTER — OUTPATIENT (OUTPATIENT)
Dept: OUTPATIENT SERVICES | Facility: HOSPITAL | Age: 49
LOS: 1 days | End: 2024-02-02
Payer: COMMERCIAL

## 2024-02-02 ENCOUNTER — APPOINTMENT (OUTPATIENT)
Dept: CT IMAGING | Facility: CLINIC | Age: 49
End: 2024-02-02
Payer: COMMERCIAL

## 2024-02-02 DIAGNOSIS — C18.9 MALIGNANT NEOPLASM OF COLON, UNSPECIFIED: ICD-10-CM

## 2024-02-02 DIAGNOSIS — Z00.8 ENCOUNTER FOR OTHER GENERAL EXAMINATION: ICD-10-CM

## 2024-02-02 DIAGNOSIS — Z90.49 ACQUIRED ABSENCE OF OTHER SPECIFIED PARTS OF DIGESTIVE TRACT: Chronic | ICD-10-CM

## 2024-02-02 PROCEDURE — 74177 CT ABD & PELVIS W/CONTRAST: CPT | Mod: 26

## 2024-02-02 PROCEDURE — 71260 CT THORAX DX C+: CPT

## 2024-02-02 PROCEDURE — 74177 CT ABD & PELVIS W/CONTRAST: CPT

## 2024-02-02 PROCEDURE — 71260 CT THORAX DX C+: CPT | Mod: 26

## 2024-03-06 ENCOUNTER — OUTPATIENT (OUTPATIENT)
Dept: OUTPATIENT SERVICES | Facility: HOSPITAL | Age: 49
LOS: 1 days | Discharge: HOME | End: 2024-03-06

## 2024-03-06 DIAGNOSIS — C18.9 MALIGNANT NEOPLASM OF COLON, UNSPECIFIED: ICD-10-CM

## 2024-03-06 DIAGNOSIS — Z90.49 ACQUIRED ABSENCE OF OTHER SPECIFIED PARTS OF DIGESTIVE TRACT: Chronic | ICD-10-CM

## 2024-03-18 ENCOUNTER — APPOINTMENT (OUTPATIENT)
Dept: HEMATOLOGY ONCOLOGY | Facility: CLINIC | Age: 49
End: 2024-03-18
Payer: COMMERCIAL

## 2024-03-18 VITALS
DIASTOLIC BLOOD PRESSURE: 93 MMHG | WEIGHT: 183.42 LBS | OXYGEN SATURATION: 96 % | HEART RATE: 89 BPM | BODY MASS INDEX: 29.6 KG/M2 | SYSTOLIC BLOOD PRESSURE: 134 MMHG | TEMPERATURE: 97.3 F | RESPIRATION RATE: 16 BRPM

## 2024-03-18 DIAGNOSIS — C18.9 MALIGNANT NEOPLASM OF COLON, UNSPECIFIED: ICD-10-CM

## 2024-03-18 PROCEDURE — 99213 OFFICE O/P EST LOW 20 MIN: CPT

## 2024-03-18 NOTE — HISTORY OF PRESENT ILLNESS
[Disease: _____________________] : Disease: [unfilled] [N: ___] : N[unfilled] [T: ___] : T[unfilled] [M: ___] : M[unfilled] [AJCC Stage: ____] : AJCC Stage: [unfilled] [de-identified] : 45 M with h/o chronic hepatitis B on entecavir presents for further management of resected high risk Stage III (T3/4N2), DAVIE R colon cancer,   Chi was admitted to [facility] on 8/2/20 with acute onset abdominal pain and fever. CT A/P showed circumferential mural thickening of the ascending colon with adjacent enlarged mesenteric LN. Primary consideration was a neoplasm. A colonoscopy on 8/4/20 showed an obstructing mass in the ascending colon. 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 On Aug 7, 2020, underwent a hemicolectomy, excised portion of small bowel. BCx from admission showed Clostridum septicum but repeat cultures were negative. Completed 2 weeks of antibiotics. Post op course otherwise was unremarkable.   Referred to medical oncology for adjuvant therapy.   8/24/20: CT Chest: 4 mm LL nodule, 3 mos scan f/u to determine stability  9/2-2/3/21:C1-12 FOLFOX (omitted Oxaliplatin C12) 11/11/20 C5 (cytokine storm during Oxali infusion characterized by hypotension resolved with IVF) 11/14/20: CT CAP: pulm nodule unchanged, no mets 11/25/20: C6 (to run NS @100 ml/hr along with Oxali over 4 hours) 2/27/21: CT CAP: AMANDEEP 7/31/21: [place]: AMANDEEP  2/18/22: CT CAP: AMANDEEP 2/24/23: CT CAP: AMANDEEP  2/2/24: CT CAP: AMANDEEP [FreeTextEntry1] : surveillance  [de-identified] : moderately invasive adenocarcinoma  [de-identified] : overall feels well. follows with hepatology and due for screening colo in june. denies any c/o.

## 2024-03-19 LAB — CEA SERPL-MCNC: 2 NG/ML

## 2024-05-16 ENCOUNTER — APPOINTMENT (OUTPATIENT)
Dept: ULTRASOUND IMAGING | Facility: CLINIC | Age: 49
End: 2024-05-16
Payer: COMMERCIAL

## 2024-05-16 ENCOUNTER — OUTPATIENT (OUTPATIENT)
Dept: OUTPATIENT SERVICES | Facility: HOSPITAL | Age: 49
LOS: 1 days | End: 2024-05-16
Payer: COMMERCIAL

## 2024-05-16 DIAGNOSIS — B19.10 UNSPECIFIED VIRAL HEPATITIS B WITHOUT HEPATIC COMA: ICD-10-CM

## 2024-05-16 DIAGNOSIS — Z90.49 ACQUIRED ABSENCE OF OTHER SPECIFIED PARTS OF DIGESTIVE TRACT: Chronic | ICD-10-CM

## 2024-05-16 DIAGNOSIS — Z00.8 ENCOUNTER FOR OTHER GENERAL EXAMINATION: ICD-10-CM

## 2024-05-16 PROCEDURE — 76700 US EXAM ABDOM COMPLETE: CPT

## 2024-05-16 PROCEDURE — 76700 US EXAM ABDOM COMPLETE: CPT | Mod: 26

## 2024-06-17 LAB
AFP-TM SERPL-MCNC: 3.1 NG/ML
ALBUMIN SERPL ELPH-MCNC: 5 G/DL
ALP BLD-CCNC: 80 U/L
ALT SERPL-CCNC: 30 U/L
ANION GAP SERPL CALC-SCNC: 16 MMOL/L
AST SERPL-CCNC: 22 U/L
BASOPHILS # BLD AUTO: 0.04 K/UL
BASOPHILS NFR BLD AUTO: 0.8 %
BILIRUB SERPL-MCNC: 1.2 MG/DL
BUN SERPL-MCNC: 13 MG/DL
CALCIUM SERPL-MCNC: 9.5 MG/DL
CHLORIDE SERPL-SCNC: 102 MMOL/L
CO2 SERPL-SCNC: 20 MMOL/L
CREAT SERPL-MCNC: 0.86 MG/DL
EGFR: 105 ML/MIN/1.73M2
EOSINOPHIL # BLD AUTO: 0.08 K/UL
EOSINOPHIL NFR BLD AUTO: 1.5 %
GLUCOSE SERPL-MCNC: 105 MG/DL
HBV DNA # SERPL NAA+PROBE: <10 IU/ML
HBV SURFACE AB SER QL: NONREACTIVE
HBV SURFACE AG SER QL: REACTIVE
HCT VFR BLD CALC: 44.6 %
HEPB DNA PCR INT: DETECTED
HEPB DNA PCR LOG: <1 LOGIU/ML
HGB BLD-MCNC: 14.5 G/DL
IMM GRANULOCYTES NFR BLD AUTO: 0.2 %
LYMPHOCYTES # BLD AUTO: 1.86 K/UL
LYMPHOCYTES NFR BLD AUTO: 35.4 %
MAN DIFF?: NORMAL
MCHC RBC-ENTMCNC: 28.6 PG
MCHC RBC-ENTMCNC: 32.5 GM/DL
MCV RBC AUTO: 88 FL
MONOCYTES # BLD AUTO: 0.36 K/UL
MONOCYTES NFR BLD AUTO: 6.8 %
NEUTROPHILS # BLD AUTO: 2.91 K/UL
NEUTROPHILS NFR BLD AUTO: 55.3 %
PLATELET # BLD AUTO: 256 K/UL
POTASSIUM SERPL-SCNC: 4.5 MMOL/L
PROT SERPL-MCNC: 7.7 G/DL
RBC # BLD: 5.07 M/UL
RBC # FLD: 13.2 %
SODIUM SERPL-SCNC: 139 MMOL/L
WBC # FLD AUTO: 5.26 K/UL

## 2024-06-28 ENCOUNTER — APPOINTMENT (OUTPATIENT)
Dept: HEPATOLOGY | Facility: CLINIC | Age: 49
End: 2024-06-28
Payer: COMMERCIAL

## 2024-06-28 VITALS
SYSTOLIC BLOOD PRESSURE: 123 MMHG | HEIGHT: 66 IN | HEART RATE: 73 BPM | DIASTOLIC BLOOD PRESSURE: 85 MMHG | WEIGHT: 179 LBS | BODY MASS INDEX: 28.77 KG/M2 | TEMPERATURE: 97.2 F | OXYGEN SATURATION: 100 %

## 2024-06-28 DIAGNOSIS — B19.10 UNSPECIFIED VIRAL HEPATITIS B W/OUT HEPATIC COMA: ICD-10-CM

## 2024-06-28 DIAGNOSIS — E78.00 PURE HYPERCHOLESTEROLEMIA, UNSPECIFIED: ICD-10-CM

## 2024-06-28 DIAGNOSIS — E66.3 OVERWEIGHT: ICD-10-CM

## 2024-06-28 DIAGNOSIS — K76.0 FATTY (CHANGE OF) LIVER, NOT ELSEWHERE CLASSIFIED: ICD-10-CM

## 2024-06-28 DIAGNOSIS — Z85.038 PERSONAL HISTORY OF OTHER MALIGNANT NEOPLASM OF LARGE INTESTINE: ICD-10-CM

## 2024-06-28 PROCEDURE — 99214 OFFICE O/P EST MOD 30 MIN: CPT

## 2024-06-28 PROCEDURE — G2211 COMPLEX E/M VISIT ADD ON: CPT

## 2024-07-24 ENCOUNTER — APPOINTMENT (OUTPATIENT)
Dept: HEPATOLOGY | Facility: HOSPITAL | Age: 49
End: 2024-07-24

## 2024-07-24 NOTE — ASSESSMENT
[FreeTextEntry1] : - 7/24/24 colonoscopy: excellent prep, no polyps. Normal terminal ileum. Healthy ileo-colonic anastomosis with one visible surgical clip. Small perianal skin tag. Repeat in 5 years.

## 2024-09-07 ENCOUNTER — OUTPATIENT (OUTPATIENT)
Dept: OUTPATIENT SERVICES | Facility: HOSPITAL | Age: 49
LOS: 1 days | Discharge: HOME | End: 2024-09-07

## 2024-09-07 DIAGNOSIS — Z90.49 ACQUIRED ABSENCE OF OTHER SPECIFIED PARTS OF DIGESTIVE TRACT: Chronic | ICD-10-CM

## 2024-09-07 DIAGNOSIS — C18.9 MALIGNANT NEOPLASM OF COLON, UNSPECIFIED: ICD-10-CM

## 2024-09-15 ENCOUNTER — NON-APPOINTMENT (OUTPATIENT)
Age: 49
End: 2024-09-15

## 2024-09-16 ENCOUNTER — APPOINTMENT (OUTPATIENT)
Dept: HEMATOLOGY ONCOLOGY | Facility: CLINIC | Age: 49
End: 2024-09-16

## 2024-09-16 VITALS
RESPIRATION RATE: 16 BRPM | DIASTOLIC BLOOD PRESSURE: 96 MMHG | HEART RATE: 59 BPM | WEIGHT: 181.22 LBS | BODY MASS INDEX: 29.25 KG/M2 | SYSTOLIC BLOOD PRESSURE: 152 MMHG | TEMPERATURE: 97.2 F | OXYGEN SATURATION: 100 %

## 2024-09-16 DIAGNOSIS — G62.0 DRUG-INDUCED POLYNEUROPATHY: ICD-10-CM

## 2024-09-16 DIAGNOSIS — C18.9 MALIGNANT NEOPLASM OF COLON, UNSPECIFIED: ICD-10-CM

## 2024-09-16 DIAGNOSIS — T45.1X5A DRUG-INDUCED POLYNEUROPATHY: ICD-10-CM

## 2024-09-16 PROCEDURE — 99213 OFFICE O/P EST LOW 20 MIN: CPT

## 2024-09-17 LAB — CEA SERPL-MCNC: 1.9 NG/ML

## 2024-09-27 NOTE — BEGINNING OF VISIT
[0] : 2) Feeling down, depressed, or hopeless: Not at all (0) [PHQ-2 Negative] : PHQ-2 Negative [[id number]] : 0 [Pain Scale: ___] : On a scale of 1-10, today the patient's pain is a(n) [unfilled]. [Future Reassessment of Pain Scale] : Future reassessment of pain scale [Never] : Never [Patient/Caregiver not ready to engage] : Patient/Caregiver not ready to engage [Abdominal Pain] : no abdominal pain [Vomiting] : no vomiting [Constipation] : no constipation [Diarrhea Character] : Diarrhea: Grade 0

## 2024-09-27 NOTE — HISTORY OF PRESENT ILLNESS
[Disease: _____________________] : Disease: [unfilled] [T: ___] : T[unfilled] [N: ___] : N[unfilled] [M: ___] : M[unfilled] [AJCC Stage: ____] : AJCC Stage: [unfilled] [de-identified] : 45 M with h/o chronic hepatitis B on entecavir presents for further management of resected high risk Stage III (T3/4N2), DAVIE R colon cancer,   Chi was admitted to [facility] on 8/2/20 with acute onset abdominal pain and fever. CT A/P showed circumferential mural thickening of the ascending colon with adjacent enlarged mesenteric LN. Primary consideration was a neoplasm. A colonoscopy on 8/4/20 showed an obstructing mass in the ascending colon. 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 On Aug 7, 2020, underwent a hemicolectomy, excised portion of small bowel. BCx from admission showed Clostridum septicum but repeat cultures were negative. Completed 2 weeks of antibiotics. Post op course otherwise was unremarkable.   Referred to medical oncology for adjuvant therapy.   8/24/20: CT Chest: 4 mm LL nodule, 3 mos scan f/u to determine stability  9/2-2/3/21:C1-12 FOLFOX (omitted Oxaliplatin C12) 11/11/20 C5 (cytokine storm during Oxali infusion characterized by hypotension resolved with IVF) 11/14/20: CT CAP: pulm nodule unchanged, no mets 11/25/20: C6 (to run NS @100 ml/hr along with Oxali over 4 hours) 2/27/21: CT CAP: AMANDEEP 7/31/21: [place]: AMANDEEP  2/18/22: CT CAP: AMANDEEP 2/24/23: CT CAP: AMANDEEP  2/2/24: CT CAP: AMANDEEP 7/24/24: Colonoscopy. AMANDEEP. No specimens collected.  [de-identified] : moderately invasive adenocarcinoma  [FreeTextEntry1] : surveillance  [de-identified] : Here for clinical follow up  Doing well. No new GI symptoms  Neuropathy: tingling in his fingers and toes. Resumed acupuncture after about one year break. More sensitive to cold. Not interfering with ADLs.

## 2024-09-27 NOTE — HISTORY OF PRESENT ILLNESS
[Disease: _____________________] : Disease: [unfilled] [T: ___] : T[unfilled] [N: ___] : N[unfilled] [M: ___] : M[unfilled] [AJCC Stage: ____] : AJCC Stage: [unfilled] [de-identified] : 45 M with h/o chronic hepatitis B on entecavir presents for further management of resected high risk Stage III (T3/4N2), DAVIE R colon cancer,   Chi was admitted to [facility] on 8/2/20 with acute onset abdominal pain and fever. CT A/P showed circumferential mural thickening of the ascending colon with adjacent enlarged mesenteric LN. Primary consideration was a neoplasm. A colonoscopy on 8/4/20 showed an obstructing mass in the ascending colon. 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 On Aug 7, 2020, underwent a hemicolectomy, excised portion of small bowel. BCx from admission showed Clostridum septicum but repeat cultures were negative. Completed 2 weeks of antibiotics. Post op course otherwise was unremarkable.   Referred to medical oncology for adjuvant therapy.   8/24/20: CT Chest: 4 mm LL nodule, 3 mos scan f/u to determine stability  9/2-2/3/21:C1-12 FOLFOX (omitted Oxaliplatin C12) 11/11/20 C5 (cytokine storm during Oxali infusion characterized by hypotension resolved with IVF) 11/14/20: CT CAP: pulm nodule unchanged, no mets 11/25/20: C6 (to run NS @100 ml/hr along with Oxali over 4 hours) 2/27/21: CT CAP: AMANDEEP 7/31/21: [place]: AMANDEEP  2/18/22: CT CAP: AMANDEEP 2/24/23: CT CAP: AMANDEEP  2/2/24: CT CAP: AMANDEEP 7/24/24: Colonoscopy. AMANDEEP. No specimens collected.  [de-identified] : moderately invasive adenocarcinoma  [FreeTextEntry1] : surveillance  [de-identified] : Here for clinical follow up  Doing well. No new GI symptoms  Neuropathy: tingling in his fingers and toes. Resumed acupuncture after about one year break. More sensitive to cold. Not interfering with ADLs.

## 2024-09-27 NOTE — BEGINNING OF VISIT
[0] : 2) Feeling down, depressed, or hopeless: Not at all (0) [PHQ-2 Negative] : PHQ-2 Negative [[id number]] : 0 [Pain Scale: ___] : On a scale of 1-10, today the patient's pain is a(n) [unfilled]. [Future Reassessment of Pain Scale] : Future reassessment of pain scale [Never] : Never [Patient/Caregiver not ready to engage] : Patient/Caregiver not ready to engage [Abdominal Pain] : no abdominal pain [Vomiting] : no vomiting [Constipation] : no constipation [Diarrhea Character] : Diarrhea: Grade 0

## 2024-09-27 NOTE — HISTORY OF PRESENT ILLNESS
[Disease: _____________________] : Disease: [unfilled] [T: ___] : T[unfilled] [N: ___] : N[unfilled] [M: ___] : M[unfilled] [AJCC Stage: ____] : AJCC Stage: [unfilled] [de-identified] : 45 M with h/o chronic hepatitis B on entecavir presents for further management of resected high risk Stage III (T3/4N2), DAVIE R colon cancer,   Chi was admitted to [facility] on 8/2/20 with acute onset abdominal pain and fever. CT A/P showed circumferential mural thickening of the ascending colon with adjacent enlarged mesenteric LN. Primary consideration was a neoplasm. A colonoscopy on 8/4/20 showed an obstructing mass in the ascending colon. 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 On Aug 7, 2020, underwent a hemicolectomy, excised portion of small bowel. BCx from admission showed Clostridum septicum but repeat cultures were negative. Completed 2 weeks of antibiotics. Post op course otherwise was unremarkable.   Referred to medical oncology for adjuvant therapy.   8/24/20: CT Chest: 4 mm LL nodule, 3 mos scan f/u to determine stability  9/2-2/3/21:C1-12 FOLFOX (omitted Oxaliplatin C12) 11/11/20 C5 (cytokine storm during Oxali infusion characterized by hypotension resolved with IVF) 11/14/20: CT CAP: pulm nodule unchanged, no mets 11/25/20: C6 (to run NS @100 ml/hr along with Oxali over 4 hours) 2/27/21: CT CAP: AMANDEEP 7/31/21: [place]: AMANDEEP  2/18/22: CT CAP: AMANDEEP 2/24/23: CT CAP: AMANDEEP  2/2/24: CT CAP: AMANDEEP 7/24/24: Colonoscopy. AMANDEEP. No specimens collected.  [de-identified] : moderately invasive adenocarcinoma  [FreeTextEntry1] : surveillance  [de-identified] : Here for clinical follow up  Doing well. No new GI symptoms  Neuropathy: tingling in his fingers and toes. Resumed acupuncture after about one year break. More sensitive to cold. Not interfering with ADLs.

## 2024-09-27 NOTE — HISTORY OF PRESENT ILLNESS
[Disease: _____________________] : Disease: [unfilled] [T: ___] : T[unfilled] [N: ___] : N[unfilled] [M: ___] : M[unfilled] [AJCC Stage: ____] : AJCC Stage: [unfilled] [de-identified] : 45 M with h/o chronic hepatitis B on entecavir presents for further management of resected high risk Stage III (T3/4N2), DAVIE R colon cancer,   Chi was admitted to [facility] on 8/2/20 with acute onset abdominal pain and fever. CT A/P showed circumferential mural thickening of the ascending colon with adjacent enlarged mesenteric LN. Primary consideration was a neoplasm. A colonoscopy on 8/4/20 showed an obstructing mass in the ascending colon. 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 On Aug 7, 2020, underwent a hemicolectomy, excised portion of small bowel. BCx from admission showed Clostridum septicum but repeat cultures were negative. Completed 2 weeks of antibiotics. Post op course otherwise was unremarkable.   Referred to medical oncology for adjuvant therapy.   8/24/20: CT Chest: 4 mm LL nodule, 3 mos scan f/u to determine stability  9/2-2/3/21:C1-12 FOLFOX (omitted Oxaliplatin C12) 11/11/20 C5 (cytokine storm during Oxali infusion characterized by hypotension resolved with IVF) 11/14/20: CT CAP: pulm nodule unchanged, no mets 11/25/20: C6 (to run NS @100 ml/hr along with Oxali over 4 hours) 2/27/21: CT CAP: AMANDEEP 7/31/21: [place]: AMANDEEP  2/18/22: CT CAP: AMANDEEP 2/24/23: CT CAP: AMANDEEP  2/2/24: CT CAP: AMANDEEP 7/24/24: Colonoscopy. AMANDEEP. No specimens collected.  [de-identified] : moderately invasive adenocarcinoma  [FreeTextEntry1] : surveillance  [de-identified] : Here for clinical follow up  Doing well. No new GI symptoms  Neuropathy: tingling in his fingers and toes. Resumed acupuncture after about one year break. More sensitive to cold. Not interfering with ADLs.

## 2024-09-27 NOTE — BEGINNING OF VISIT
[0] : 2) Feeling down, depressed, or hopeless: Not at all (0) [PHQ-2 Negative] : PHQ-2 Negative [[id number]] : 0 [Pain Scale: ___] : On a scale of 1-10, today the patient's pain is a(n) [unfilled]. [Future Reassessment of Pain Scale] : Future reassessment of pain scale [Never] : Never [Patient/Caregiver not ready to engage] : Patient/Caregiver not ready to engage [Abdominal Pain] : no abdominal pain [Vomiting] : no vomiting [Constipation] : no constipation [Diarrhea Character] : Diarrhea: Grade 0

## 2024-09-27 NOTE — BEGINNING OF VISIT
[0] : 2) Feeling down, depressed, or hopeless: Not at all (0) [PHQ-2 Negative] : PHQ-2 Negative [[id number]] : 0 [Pain Scale: ___] : On a scale of 1-10, today the patient's pain is a(n) [unfilled]. [Future Reassessment of Pain Scale] : Future reassessment of pain scale [Never] : Never [Patient/Caregiver not ready to engage] : Patient/Caregiver not ready to engage [Abdominal Pain] : no abdominal pain [Vomiting] : no vomiting [Constipation] : no constipation [Diarrhea Character] : Diarrhea: Grade 0

## 2024-10-04 NOTE — ASU PREOP CHECKLIST - ALLERGIES REVIEWED
Called patient with result of his PSA and CT scan.  When I saw him I went over the result of his CT scan based on my review, but the official radiologist report had not returned.    Discussed CT scan looks good overall and basically what I had told him when I saw him.  The RML and LLL nodules have minimally increased by maybe 1 mm, while all of the nodules have remained stable.  Will plan to repeat this in 6 months when I see him.    PSA also looks good and is only up slightly to 1.44 (previous 1.28).  We will continue to monitor this.  
done

## 2024-11-19 ENCOUNTER — RX RENEWAL (OUTPATIENT)
Age: 49
End: 2024-11-19

## 2024-12-07 ENCOUNTER — APPOINTMENT (OUTPATIENT)
Dept: ULTRASOUND IMAGING | Facility: CLINIC | Age: 49
End: 2024-12-07
Payer: COMMERCIAL

## 2024-12-07 PROCEDURE — 76700 US EXAM ABDOM COMPLETE: CPT

## 2024-12-16 ENCOUNTER — APPOINTMENT (OUTPATIENT)
Dept: HEPATOLOGY | Facility: CLINIC | Age: 49
End: 2024-12-16
Payer: COMMERCIAL

## 2024-12-16 ENCOUNTER — NON-APPOINTMENT (OUTPATIENT)
Age: 49
End: 2024-12-16

## 2024-12-16 VITALS
HEART RATE: 84 BPM | OXYGEN SATURATION: 99 % | TEMPERATURE: 97.3 F | WEIGHT: 177 LBS | BODY MASS INDEX: 28.45 KG/M2 | HEIGHT: 66 IN | SYSTOLIC BLOOD PRESSURE: 131 MMHG | DIASTOLIC BLOOD PRESSURE: 88 MMHG

## 2024-12-16 DIAGNOSIS — R17 UNSPECIFIED JAUNDICE: ICD-10-CM

## 2024-12-16 DIAGNOSIS — B19.10 UNSPECIFIED VIRAL HEPATITIS B W/OUT HEPATIC COMA: ICD-10-CM

## 2024-12-16 DIAGNOSIS — K76.0 FATTY (CHANGE OF) LIVER, NOT ELSEWHERE CLASSIFIED: ICD-10-CM

## 2024-12-16 DIAGNOSIS — Z85.038 PERSONAL HISTORY OF OTHER MALIGNANT NEOPLASM OF LARGE INTESTINE: ICD-10-CM

## 2024-12-16 PROCEDURE — G2211 COMPLEX E/M VISIT ADD ON: CPT | Mod: NC

## 2024-12-16 PROCEDURE — 99214 OFFICE O/P EST MOD 30 MIN: CPT

## 2024-12-16 PROCEDURE — 76981 USE PARENCHYMA: CPT

## 2024-12-27 ENCOUNTER — APPOINTMENT (OUTPATIENT)
Dept: HEPATOLOGY | Facility: CLINIC | Age: 49
End: 2024-12-27

## 2025-02-01 ENCOUNTER — OUTPATIENT (OUTPATIENT)
Dept: OUTPATIENT SERVICES | Facility: HOSPITAL | Age: 50
LOS: 1 days | End: 2025-02-01
Payer: COMMERCIAL

## 2025-02-01 ENCOUNTER — APPOINTMENT (OUTPATIENT)
Dept: CT IMAGING | Facility: CLINIC | Age: 50
End: 2025-02-01
Payer: COMMERCIAL

## 2025-02-01 DIAGNOSIS — Z90.49 ACQUIRED ABSENCE OF OTHER SPECIFIED PARTS OF DIGESTIVE TRACT: Chronic | ICD-10-CM

## 2025-02-01 DIAGNOSIS — C18.9 MALIGNANT NEOPLASM OF COLON, UNSPECIFIED: ICD-10-CM

## 2025-02-01 PROCEDURE — 74177 CT ABD & PELVIS W/CONTRAST: CPT

## 2025-02-01 PROCEDURE — 71260 CT THORAX DX C+: CPT | Mod: 26

## 2025-02-01 PROCEDURE — 74177 CT ABD & PELVIS W/CONTRAST: CPT | Mod: 26

## 2025-02-01 PROCEDURE — 71260 CT THORAX DX C+: CPT

## 2025-02-27 ENCOUNTER — OUTPATIENT (OUTPATIENT)
Dept: OUTPATIENT SERVICES | Facility: HOSPITAL | Age: 50
LOS: 1 days | Discharge: HOME | End: 2025-02-27

## 2025-02-27 DIAGNOSIS — Z90.49 ACQUIRED ABSENCE OF OTHER SPECIFIED PARTS OF DIGESTIVE TRACT: Chronic | ICD-10-CM

## 2025-02-27 DIAGNOSIS — C18.9 MALIGNANT NEOPLASM OF COLON, UNSPECIFIED: ICD-10-CM

## 2025-03-03 ENCOUNTER — APPOINTMENT (OUTPATIENT)
Dept: HEMATOLOGY ONCOLOGY | Facility: CLINIC | Age: 50
End: 2025-03-03
Payer: COMMERCIAL

## 2025-03-03 ENCOUNTER — NON-APPOINTMENT (OUTPATIENT)
Age: 50
End: 2025-03-03

## 2025-03-03 ENCOUNTER — APPOINTMENT (OUTPATIENT)
Dept: HEMATOLOGY ONCOLOGY | Facility: CLINIC | Age: 50
End: 2025-03-03

## 2025-03-03 VITALS
HEART RATE: 71 BPM | HEIGHT: 66 IN | RESPIRATION RATE: 16 BRPM | TEMPERATURE: 97.8 F | WEIGHT: 175.03 LBS | SYSTOLIC BLOOD PRESSURE: 148 MMHG | DIASTOLIC BLOOD PRESSURE: 93 MMHG | OXYGEN SATURATION: 99 % | BODY MASS INDEX: 28.13 KG/M2

## 2025-03-03 DIAGNOSIS — C18.9 MALIGNANT NEOPLASM OF COLON, UNSPECIFIED: ICD-10-CM

## 2025-03-03 PROCEDURE — 99213 OFFICE O/P EST LOW 20 MIN: CPT

## 2025-03-04 LAB — CEA SERPL-MCNC: 1.8 NG/ML

## 2025-06-07 ENCOUNTER — OUTPATIENT (OUTPATIENT)
Dept: OUTPATIENT SERVICES | Facility: HOSPITAL | Age: 50
LOS: 1 days | End: 2025-06-07
Payer: COMMERCIAL

## 2025-06-07 ENCOUNTER — APPOINTMENT (OUTPATIENT)
Dept: ULTRASOUND IMAGING | Facility: CLINIC | Age: 50
End: 2025-06-07
Payer: COMMERCIAL

## 2025-06-07 DIAGNOSIS — B19.10 UNSPECIFIED VIRAL HEPATITIS B WITHOUT HEPATIC COMA: ICD-10-CM

## 2025-06-07 DIAGNOSIS — Z90.49 ACQUIRED ABSENCE OF OTHER SPECIFIED PARTS OF DIGESTIVE TRACT: Chronic | ICD-10-CM

## 2025-06-07 PROCEDURE — 76700 US EXAM ABDOM COMPLETE: CPT | Mod: 26

## 2025-06-07 PROCEDURE — 76700 US EXAM ABDOM COMPLETE: CPT

## 2025-06-16 ENCOUNTER — APPOINTMENT (OUTPATIENT)
Dept: HEPATOLOGY | Facility: CLINIC | Age: 50
End: 2025-06-16
Payer: COMMERCIAL

## 2025-06-16 VITALS
TEMPERATURE: 98.1 F | WEIGHT: 177 LBS | SYSTOLIC BLOOD PRESSURE: 153 MMHG | HEART RATE: 67 BPM | BODY MASS INDEX: 28.45 KG/M2 | OXYGEN SATURATION: 100 % | DIASTOLIC BLOOD PRESSURE: 95 MMHG | RESPIRATION RATE: 16 BRPM | HEIGHT: 66 IN

## 2025-06-16 PROBLEM — Z13.220 SCREENING FOR HYPERLIPIDEMIA: Status: ACTIVE | Noted: 2025-06-16

## 2025-06-16 PROBLEM — Z13.1 SCREENING FOR DIABETES MELLITUS: Status: ACTIVE | Noted: 2025-06-16

## 2025-06-16 PROCEDURE — G2211 COMPLEX E/M VISIT ADD ON: CPT | Mod: NC

## 2025-06-16 PROCEDURE — 99214 OFFICE O/P EST MOD 30 MIN: CPT

## 2025-09-08 ENCOUNTER — RESULT REVIEW (OUTPATIENT)
Age: 50
End: 2025-09-08

## 2025-09-08 ENCOUNTER — APPOINTMENT (OUTPATIENT)
Dept: HEMATOLOGY ONCOLOGY | Facility: CLINIC | Age: 50
End: 2025-09-08
Payer: COMMERCIAL

## 2025-09-08 VITALS
HEART RATE: 71 BPM | DIASTOLIC BLOOD PRESSURE: 93 MMHG | WEIGHT: 178.56 LBS | RESPIRATION RATE: 17 BRPM | OXYGEN SATURATION: 99 % | BODY MASS INDEX: 28.82 KG/M2 | TEMPERATURE: 96.8 F | SYSTOLIC BLOOD PRESSURE: 142 MMHG

## 2025-09-08 DIAGNOSIS — C18.9 MALIGNANT NEOPLASM OF COLON, UNSPECIFIED: ICD-10-CM

## 2025-09-08 PROCEDURE — 99213 OFFICE O/P EST LOW 20 MIN: CPT

## 2025-09-09 LAB
ALBUMIN SERPL ELPH-MCNC: 4.8 G/DL
ALP BLD-CCNC: 74 U/L
ALT SERPL-CCNC: 47 U/L
ANION GAP SERPL CALC-SCNC: 11 MMOL/L
AST SERPL-CCNC: 34 U/L
BILIRUB SERPL-MCNC: 1.1 MG/DL
BUN SERPL-MCNC: 12 MG/DL
CALCIUM SERPL-MCNC: 9.2 MG/DL
CEA SERPL-MCNC: 1.8 NG/ML
CHLORIDE SERPL-SCNC: 104 MMOL/L
CO2 SERPL-SCNC: 24 MMOL/L
CREAT SERPL-MCNC: 0.74 MG/DL
EGFRCR SERPLBLD CKD-EPI 2021: 109 ML/MIN/1.73M2
GLUCOSE SERPL-MCNC: 119 MG/DL
POTASSIUM SERPL-SCNC: 4.6 MMOL/L
PROT SERPL-MCNC: 7.2 G/DL
SODIUM SERPL-SCNC: 140 MMOL/L